# Patient Record
Sex: FEMALE | Race: WHITE | URBAN - METROPOLITAN AREA
[De-identification: names, ages, dates, MRNs, and addresses within clinical notes are randomized per-mention and may not be internally consistent; named-entity substitution may affect disease eponyms.]

---

## 2018-07-16 ENCOUNTER — TRANSFERRED RECORDS (OUTPATIENT)
Dept: HEALTH INFORMATION MANAGEMENT | Facility: CLINIC | Age: 68
End: 2018-07-16

## 2018-07-16 LAB — CREAT SERPL-MCNC: 0.73 MG/DL (ref 0.6–1)

## 2018-08-09 ENCOUNTER — TRANSFERRED RECORDS (OUTPATIENT)
Dept: HEALTH INFORMATION MANAGEMENT | Facility: CLINIC | Age: 68
End: 2018-08-09

## 2018-08-23 ENCOUNTER — ONCOLOGY VISIT (OUTPATIENT)
Dept: ONCOLOGY | Facility: CLINIC | Age: 68
End: 2018-08-23
Attending: INTERNAL MEDICINE
Payer: COMMERCIAL

## 2018-08-23 VITALS
SYSTOLIC BLOOD PRESSURE: 159 MMHG | HEIGHT: 67 IN | TEMPERATURE: 98.1 F | RESPIRATION RATE: 18 BRPM | WEIGHT: 170.7 LBS | HEART RATE: 64 BPM | BODY MASS INDEX: 26.79 KG/M2 | OXYGEN SATURATION: 99 % | DIASTOLIC BLOOD PRESSURE: 65 MMHG

## 2018-08-23 DIAGNOSIS — C21.1 MALIGNANT NEOPLASM OF ANAL CANAL (H): ICD-10-CM

## 2018-08-23 DIAGNOSIS — I48.20 CHRONIC ATRIAL FIBRILLATION (H): ICD-10-CM

## 2018-08-23 PROBLEM — Z72.0 NICOTINE ABUSE: Status: ACTIVE | Noted: 2018-08-23

## 2018-08-23 PROCEDURE — 99205 OFFICE O/P NEW HI 60 MIN: CPT | Performed by: INTERNAL MEDICINE

## 2018-08-23 PROCEDURE — G0463 HOSPITAL OUTPT CLINIC VISIT: HCPCS

## 2018-08-23 RX ORDER — CLONIDINE HYDROCHLORIDE 0.1 MG/1
0.1 TABLET ORAL
COMMUNITY
End: 2018-08-30

## 2018-08-23 ASSESSMENT — PAIN SCALES - GENERAL: PAINLEVEL: MILD PAIN (2)

## 2018-08-23 NOTE — LETTER
8/23/2018         RE: Jenise Calix  [address]  [place] [address]        Dear Colleague,    Thank you for referring your patient, Jenise Calix, to the [facility]. Please see a copy of my visit note below.    DATE OF VISIT: Aug 23, 2018    REASON FOR REFERRAL: Management of anal cancer.    CHIEF COMPLAINT:   Chief Complaint   Patient presents with     Oncology Clinic Visit     New Patient - Anal CA.        HISTORY OF PRESENT ILLNESS:   This is a 68-year-old female patient has been difficulty with hemorrhoids for several years.  She saw  last year in September 2017 because of large hemorrhoids and it was recommended to proceed with surgery.  Patient elected to postpone the surgery and go to [place] to visit her children.  She continued to have difficulty with defecation.  She has been having also pain with bowel movement as well.  She has been taking stool softener.  She has been also having occasional rectal bleeding.  She recently saw Dr. Becerra on July 16, 2018 and had a flexible sigmoidoscopy which showed severely ulcerated lesion at 12 o'clock position was definitive irregularity and fullness at 6 o'clock position.  Biopsies were obtained.  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  Patient had a CT scan of the chest abdomen and pelvis on July 16, 2018 which revealed 0.9 cm nodule he will aspect of the right breast felt to be sebaceous cyst.  There was a mass involving the anus and the rectum particularly on the right measuring about 7 cm x 4 cm and extending anterior to posterior 5 cm posterior and 10 cm in longest axis.  This is inseparable from the floor of urinary bladder, extending to the skin service and engulfing the vagina and urethra.  The perirectal and anal lymph nodes were involved.  There is also a 3 cm mass in the left  "inguinal nodes.  The patient is here today to discuss further plan.    REVIEW OF SYSTEMS:   Constitutional: Negative for fever, chills, and night sweats.  Skin: negative.  Eyes: negative.  Ears/Nose/Throat: negative.  Respiratory: No shortness of breath, dyspnea on exertion, cough, or hemoptysis.  Cardiovascular: negative.  Gastrointestinal: Constipation, discomfort during defecation and occasional blood per rectum  Genitourinary: negative.  Musculoskeletal: negative.  Neurologic: negative.  Psychiatric: negative.  Hematologic/Lymphatic/Immunologic: negative.  Endocrine: negative.    PAST MEDICAL HISTORY:   No past medical history on file.    PAST SURGICAL HISTORY:   No past surgical history on file.    ALLERGIES:   Allergies as of 08/23/2018 - Chau as Reviewed 08/23/2018   Allergen Reaction Noted     Metoprolol  07/26/2018       MEDICATIONS:   Current Outpatient Prescriptions   Medication Sig Dispense Refill     aspirin 81 MG tablet Take 81 mg by mouth       cloNIDine (CATAPRES) 0.1 MG tablet Take 0.1 mg by mouth          FAMILY HISTORY:   She denies any family history of malignancy.    SOCIAL HISTORY:   Social History     Social History     Marital status:      Spouse name: N/A     Number of children: N/A     Years of education: N/A     Social History Main Topics     Smoking status: Current Every Day Smoker     Packs/day: 0.75     Years: 35.00     Types: Cigarettes     Smokeless tobacco: Never Used     Alcohol use No     Drug use: No     Sexual activity: Not Asked     Other Topics Concern     None     Social History Narrative     None       PHYSICAL EXAMINATION:   /65 (BP Location: Right arm, Patient Position: Sitting, Cuff Size: Adult Regular)  Pulse 64  Temp 98.1  F (36.7  C) (Tympanic)  Resp 18  Ht 1.702 m (5' 7\")  Wt 77.4 kg (170 lb 11.2 oz)  SpO2 99%  Breastfeeding? No  BMI 26.74 kg/m2  Wt Readings from Last 10 Encounters:   08/23/18 77.4 kg (170 lb 11.2 oz)      ECOG performance status: " 1  GENERAL APPEARANCE: Healthy, alert and in no acute distress.  HEENT: Sclerae anicteric. PERRLA. Oropharynx without ulcers, lesions, or thrush.  NECK: Supple. No asymmetry or masses.  LYMPHATICS: No palpable cervical, supraclavicular, axillary, or inguinal lymphadenopathy.  RESP: Lungs clear to auscultation bilaterally without rales, rhonchi or wheezes.  CARDIOVASCULAR: Regular rate and rhythm. Normal S1, S2; no S3 or S4. No murmur, gallop, or rub.  ABDOMEN: Soft, nontender. Bowel sounds normal. No palpable organomegaly or masses.  MUSCULOSKELETAL: Extremities without gross deformities noted. No edema of bilateral lower extremities.  SKIN: No suspicious lesions or rashes.  NEURO: Alert and oriented x 3. Cranial nerves II-XII grossly intact.  PSYCHIATRIC: Mentation and affect appear normal.    LABORATORY RESULTS:  Office Visit on 11/28/2012   Component Date Value Ref Range Status     Specimen Description 11/28/2012 Toe   Final     Culture Micro 11/28/2012    Final                    Value:Moderate growth Enterobacter cloacae complex                          Moderate growth Coagulase negative Staphylococcus                          Plus normal skin vernell.     Micro Report Status 11/28/2012 FINAL 12/02/2012   Final       IMAGING RESULTS:  Outside medical records were reviewed today.    ASSESSMENT AND PLAN:    (C21.1) Malignant neoplasm of anal canal (H)  This is a 68-year-old female patient with squamous cell carcinoma of the anal canal at least stage III.  I reviewed with the patient today the natural history of squamous cell carcinoma of the anal canal.  We talked about staging, biology, management, follow-up and prognosis.  First is stable we will arrange for a PET scan to assess accurately the state of her disease.  We talked about different modalities and treatment of cell carcinoma of the anal canal including chemoradiation and surgery.  I gave the patient overview about chemoradiation.  We talked about  logistics, potential side effects of chemotherapy in details.  I will discuss this with the patient again once the PET scan is available we will refer the patient to see radiation oncology as well.  I will see the patient again next week to discuss further management.    (I48.2) Chronic atrial fibrillation (H)  She has paroxysmal atrial fibrillation.  She is currently on aspirin.    The patient is ready to learn, no apparent learning barriers were identified, Diagnosis and treatment plans were explained to the patient. The patient expressed understanding of the content. The patient questions were answered to her satisfaction.    Ced Gunn MD    I spent 60 minutes more than 50% of the time in counseling and coordination of care including discussion of natural history of renal cell carcinoma, reviewing of imaging studies, management, follow-up and prognosis    Chart documentation with Dragon Voice recognition Software. Although reviewed after completion, some words and grammatical errors may remain.    Again, thank you for allowing me to participate in the care of your patient.        Sincerely,        Ced Gunn MD

## 2018-08-23 NOTE — PROGRESS NOTES
DATE OF VISIT: Aug 23, 2018    REASON FOR REFERRAL: Management of anal cancer.    CHIEF COMPLAINT:   Chief Complaint   Patient presents with     Oncology Clinic Visit     New Patient - Anal CA.        HISTORY OF PRESENT ILLNESS:   This is a 68-year-old female patient has been difficulty with hemorrhoids for several years.  She saw  last year in September 2017 because of large hemorrhoids and it was recommended to proceed with surgery.  Patient elected to postpone the surgery and go to [place] to visit her children.  She continued to have difficulty with defecation.  She has been having also pain with bowel movement as well.  She has been taking stool softener.  She has been also having occasional rectal bleeding.  She recently saw Dr. Becerra on July 16, 2018 and had a flexible sigmoidoscopy which showed severely ulcerated lesion at 12 o'clock position was definitive irregularity and fullness at 6 o'clock position.  Biopsies were obtained.  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  Patient had a CT scan of the chest abdomen and pelvis on July 16, 2018 which revealed 0.9 cm nodule he will aspect of the right breast felt to be sebaceous cyst.  There was a mass involving the anus and the rectum particularly on the right measuring about 7 cm x 4 cm and extending anterior to posterior 5 cm posterior and 10 cm in longest axis.  This is inseparable from the floor of urinary bladder, extending to the skin service and engulfing the vagina and urethra.  The perirectal and anal lymph nodes were involved.  There is also a 3 cm mass in the left inguinal nodes.  The patient is here today to discuss further plan.    REVIEW OF SYSTEMS:   Constitutional: Negative for fever, chills, and night sweats.  Skin: negative.  Eyes: negative.  Ears/Nose/Throat: negative.  Respiratory: No shortness  "of breath, dyspnea on exertion, cough, or hemoptysis.  Cardiovascular: negative.  Gastrointestinal: Constipation, discomfort during defecation and occasional blood per rectum  Genitourinary: negative.  Musculoskeletal: negative.  Neurologic: negative.  Psychiatric: negative.  Hematologic/Lymphatic/Immunologic: negative.  Endocrine: negative.    PAST MEDICAL HISTORY:   No past medical history on file.    PAST SURGICAL HISTORY:   No past surgical history on file.    ALLERGIES:   Allergies as of 08/23/2018 - Chau as Reviewed 08/23/2018   Allergen Reaction Noted     Metoprolol  07/26/2018       MEDICATIONS:   Current Outpatient Prescriptions   Medication Sig Dispense Refill     aspirin 81 MG tablet Take 81 mg by mouth       cloNIDine (CATAPRES) 0.1 MG tablet Take 0.1 mg by mouth          FAMILY HISTORY:   She denies any family history of malignancy.    SOCIAL HISTORY:   Social History     Social History     Marital status:      Spouse name: N/A     Number of children: N/A     Years of education: N/A     Social History Main Topics     Smoking status: Current Every Day Smoker     Packs/day: 0.75     Years: 35.00     Types: Cigarettes     Smokeless tobacco: Never Used     Alcohol use No     Drug use: No     Sexual activity: Not Asked     Other Topics Concern     None     Social History Narrative     None       PHYSICAL EXAMINATION:   /65 (BP Location: Right arm, Patient Position: Sitting, Cuff Size: Adult Regular)  Pulse 64  Temp 98.1  F (36.7  C) (Tympanic)  Resp 18  Ht 1.702 m (5' 7\")  Wt 77.4 kg (170 lb 11.2 oz)  SpO2 99%  Breastfeeding? No  BMI 26.74 kg/m2  Wt Readings from Last 10 Encounters:   08/23/18 77.4 kg (170 lb 11.2 oz)      ECOG performance status: 1  GENERAL APPEARANCE: Healthy, alert and in no acute distress.  HEENT: Sclerae anicteric. PERRLA. Oropharynx without ulcers, lesions, or thrush.  NECK: Supple. No asymmetry or masses.  LYMPHATICS: No palpable cervical, supraclavicular, " axillary, or inguinal lymphadenopathy.  RESP: Lungs clear to auscultation bilaterally without rales, rhonchi or wheezes.  CARDIOVASCULAR: Regular rate and rhythm. Normal S1, S2; no S3 or S4. No murmur, gallop, or rub.  ABDOMEN: Soft, nontender. Bowel sounds normal. No palpable organomegaly or masses.  MUSCULOSKELETAL: Extremities without gross deformities noted. No edema of bilateral lower extremities.  SKIN: No suspicious lesions or rashes.  NEURO: Alert and oriented x 3. Cranial nerves II-XII grossly intact.  PSYCHIATRIC: Mentation and affect appear normal.    LABORATORY RESULTS:  Office Visit on 11/28/2012   Component Date Value Ref Range Status     Specimen Description 11/28/2012 Toe   Final     Culture Micro 11/28/2012    Final                    Value:Moderate growth Enterobacter cloacae complex                          Moderate growth Coagulase negative Staphylococcus                          Plus normal skin vernell.     Micro Report Status 11/28/2012 FINAL 12/02/2012   Final       IMAGING RESULTS:  Outside medical records were reviewed today.    ASSESSMENT AND PLAN:    (C21.1) Malignant neoplasm of anal canal (H)  This is a 68-year-old female patient with squamous cell carcinoma of the anal canal at least stage III.  I reviewed with the patient today the natural history of squamous cell carcinoma of the anal canal.  We talked about staging, biology, management, follow-up and prognosis.  First is stable we will arrange for a PET scan to assess accurately the state of her disease.  We talked about different modalities and treatment of cell carcinoma of the anal canal including chemoradiation and surgery.  I gave the patient overview about chemoradiation.  We talked about logistics, potential side effects of chemotherapy in details.  I will discuss this with the patient again once the PET scan is available we will refer the patient to see radiation oncology as well.  I will see the patient again next week to  discuss further management.    (I48.2) Chronic atrial fibrillation (H)  She has paroxysmal atrial fibrillation.  She is currently on aspirin.    The patient is ready to learn, no apparent learning barriers were identified, Diagnosis and treatment plans were explained to the patient. The patient expressed understanding of the content. The patient questions were answered to her satisfaction.    Ced Gunn MD    I spent 60 minutes more than 50% of the time in counseling and coordination of care including discussion of natural history of renal cell carcinoma, reviewing of imaging studies, management, follow-up and prognosis    Chart documentation with Dragon Voice recognition Software. Although reviewed after completion, some words and grammatical errors may remain.

## 2018-08-23 NOTE — NURSING NOTE
"Oncology Rooming Note    August 23, 2018 9:43 AM   Jenise Calix is a 68 year old female who presents for:    Chief Complaint   Patient presents with     Oncology Clinic Visit     New Patient -      Initial Vitals: /65 (BP Location: Right arm, Patient Position: Sitting, Cuff Size: Adult Regular)  Pulse 64  Temp 98.1  F (36.7  C) (Tympanic)  Resp 18  Ht 1.702 m (5' 7\")  Wt 77.4 kg (170 lb 11.2 oz)  SpO2 99%  Breastfeeding? No  BMI 26.74 kg/m2 Estimated body mass index is 26.74 kg/(m^2) as calculated from the following:    Height as of this encounter: 1.702 m (5' 7\").    Weight as of this encounter: 77.4 kg (170 lb 11.2 oz). Body surface area is 1.91 meters squared.  Mild Pain (2) Comment: anal discomfort.    No LMP recorded. Patient is postmenopausal.  Allergies reviewed: Yes  Medications reviewed: Yes    Medications: Medication refills not needed today.  Pharmacy name entered into EPIC: Data Unavailable    Clinical concerns: NEW,. Anal CA. C/o 2/10 anal discomfort. First discovered late last fall. Currently taking laxatives and stool softeners.     8 minutes for nursing intake (face to face time)     Faby Neff [facility]            "

## 2018-08-23 NOTE — PATIENT INSTRUCTIONS
We would like you to have a PET scan in the morning on 8/29/18. We would like you consult with the radiation doctor. Dr. Gunn would like to see you for a follow up appointment the same day with the results. You were given information on the medications 5FU and Mitomycin. We will do a formal teach same day as your appointment.    Copy of appointments, and after visit summary (AVS) given to patient.      If you have any questions during business hours (M-F 8 AM- 4PM), please call Rupa Plascencia RN Oncology Hematology  at [facility] [phone].       For questions after business hours, or on holidays/weekends, please call our after hours Nurse Triage line [phone]. Thank you.

## 2018-08-23 NOTE — MR AVS SNAPSHOT
After Visit Summary   8/23/2018    Jenise Calix    MRN: [id number]           Patient Information     Date Of Birth          1950        Visit Information        Provider Department      8/23/2018 10:00 AM Ced Gunn MD [facility] ONCOLOGY      Today's Diagnoses     Malignant neoplasm of anal canal (H)        Chronic atrial fibrillation (H)          Care Instructions    We would like you to have a PET scan in the morning on 8/29/18. We would like you consult with the radiation doctor. Dr. Gunn would like to see you for a follow up appointment the same day with the results. You were given information on the medications 5FU and Mitomycin. We will do a formal teach same day as your appointment.    Copy of appointments, and after visit summary (AVS) given to patient.      If you have any questions during business hours (M-F 8 AM- 4PM), please call Rupa Plascencia RN Oncology Hematology  at [facility] [phone].       For questions after business hours, or on holidays/weekends, please call our after hours Nurse Triage line [phone]. Thank you.            Follow-ups after your visit        Additional Services     RAD ONCOLOGY REFERRAL       Your provider has referred you to: Radiation therapy    Please be aware that coverage of these services is subject to the terms and limitations of your health insurance plan.  Call member services at your health plan with any benefit or coverage questions.      Please bring the following with you to your appointment:    (1) Any X-Rays, CTs or MRIs which have been performed.  Contact the facility where they were done to arrange for  prior to your scheduled appointment.    (2) List of current medications   (3) This referral request   (4) Any documents/labs given to you for this referral                  Follow-up notes from your care team     Return in about 6 days (around 8/29/2018) for  Imaging ordered today.      Your next 10 appointments already scheduled     Aug 29, 2018  9:15 AM CDT   (Arrive by 8:45 AM)   PE NPET ONCOLOGY (EYES TO THIGHS) with [id number]   [facility] Pet CT ([facility])    [address]  [facility] [address] [phone]           Tell your doctor:   If there is any chance you may be pregnant or if you are breastfeeding.   If you have problems lying in small spaces (claustrophobia). If you do, your doctor may give you medicine to help you relax. If you have diabetes:   Have your exam early in the morning. Your blood glucose will go up as the day goes by.   Your glucose level must be 180 or less at the start of the exam. Please take any oral diabetic medication you need to ensure this blood glucose level is below 180, but no insulin 4 hours prior to the exam.   If you are taking insulin in the morning take with breakfast by 6 am and schedule procedure between 12 and 2:15 pm.    If you are taking insulin at night take nightly dose, fast overnight, schedule procedure before 10 am.    If you take insulin both morning and night take morning dose by 6am and schedule procedure between 12 and 2:15 pm.  24 hours before your scan: Don t do any heavy exercise. (No jogging, aerobics or other workouts.) Exercise will make your pictures less accurate. 6 hours before your scan:   Stop all food and liquids (except water).   Do not chew gum or suck on mints.   If you need to take medicine with food, you may take it with a few crackers.  Please call your Imaging Department at your exam site with any questions.            Aug 29, 2018 10:45 AM CDT   Return Visit with Ced Gunn MD   [facility] ([facility])    [facility]  [address]  [facility] [address]   [phone]              Future tests that were ordered for you today     Open Future Orders        Priority Expected Expires Ordered    PET  "Oncology (Eyes to Thighs) Routine  2019            Who to contact     If you have questions or need follow up information about today's clinic visit or your schedule please contact [facility] directly at [phone].  Normal or non-critical lab and imaging results will be communicated to you by MyChart, letter or phone within 4 business days after the clinic has received the results. If you do not hear from us within 7 days, please contact the clinic through [employer] or phone. If you have a critical or abnormal lab result, we will notify you by phone as soon as possible.  Submit refill requests through [employer] or call your pharmacy and they will forward the refill request to us. Please allow 3 business days for your refill to be completed.          Additional Information About Your Visit        [employer] Information     [employer] lets you send messages to your doctor, view your test results, renew your prescriptions, schedule appointments and more. To sign up, go to [email or web address] . Click on \"Log in\" on the left side of the screen, which will take you to the Welcome page. Then click on \"Sign up Now\" on the right side of the page.     You will be asked to enter the access code listed below, as well as some personal information. Please follow the directions to create your username and password.     Your access code is: [email or web address]  Expires: 2018 11:02 AM     Your access code will  in 90 days. If you need help or a new code, please call your [place] clinic or [phone].        Care EveryWhere ID     This is your Care EveryWhere ID. This could be used by other organizations to access your [place] medical records  [id number]        Your Vitals Were     Pulse Temperature Respirations Height Pulse Oximetry Breastfeeding?    64 98.1  F (36.7  C) (Tympanic) 18 1.702 m (5' 7\") 99% No    BMI (Body Mass Index)                   26.74 kg/m2            Blood Pressure from Last 3 " Encounters:   08/23/18 159/65    Weight from Last 3 Encounters:   08/23/18 77.4 kg (170 lb 11.2 oz)              We Performed the Following     RAD ONCOLOGY REFERRAL        Primary Care Provider Fax #    Physician No Ref-Primary [phone]       No address on file        Equal Access to Services     AGUSTIN HAMILTON : Hadii basilio mcqueen enedeliao Soomaali, waaxda luqadaha, qaybta kaalmada adeegyada, katina howard racieldonny hernandezrobynbrianne whitaker . So [facility] [phone].    ATENCIÓN: Si habla español, tiene a sanchez disposición servicios gratuitos de asistencia lingüística. Llame al [phone].    We comply with applicable federal civil rights laws and [place] laws. We do not discriminate on the basis of race, color, national origin, age, disability, sex, sexual orientation, or gender identity.            Thank you!     Thank you for choosing [facility]  for your care. Our goal is always to provide you with excellent care. Hearing back from our patients is one way we can continue to improve our services. Please take a few minutes to complete the written survey that you may receive in the mail after your visit with us. Thank you!             Your Updated Medication List - Protect others around you: Learn how to safely use, store and throw away your medicines at www.disposemymeds.org.          This list is accurate as of 8/23/18 11:02 AM.  Always use your most recent med list.                   Brand Name Dispense Instructions for use Diagnosis    aspirin 81 MG tablet      Take 81 mg by mouth        cloNIDine 0.1 MG tablet    CATAPRES     Take 0.1 mg by mouth

## 2018-08-29 ENCOUNTER — HOSPITAL ENCOUNTER (OUTPATIENT)
Dept: PET IMAGING | Facility: CLINIC | Age: 68
Discharge: HOME | End: 2018-08-29
Attending: INTERNAL MEDICINE | Admitting: INTERNAL MEDICINE
Payer: MEDICARE

## 2018-08-29 ENCOUNTER — ONCOLOGY VISIT (OUTPATIENT)
Dept: ONCOLOGY | Facility: CLINIC | Age: 68
End: 2018-08-29
Attending: INTERNAL MEDICINE
Payer: COMMERCIAL

## 2018-08-29 VITALS
BODY MASS INDEX: 26.66 KG/M2 | OXYGEN SATURATION: 98 % | HEART RATE: 93 BPM | TEMPERATURE: 98 F | RESPIRATION RATE: 20 BRPM | DIASTOLIC BLOOD PRESSURE: 79 MMHG | WEIGHT: 170.2 LBS | SYSTOLIC BLOOD PRESSURE: 158 MMHG

## 2018-08-29 DIAGNOSIS — I48.20 CHRONIC ATRIAL FIBRILLATION (H): ICD-10-CM

## 2018-08-29 DIAGNOSIS — C21.1 MALIGNANT NEOPLASM OF ANAL CANAL (H): ICD-10-CM

## 2018-08-29 DIAGNOSIS — C21.1 MALIGNANT NEOPLASM OF ANAL CANAL (H): Primary | ICD-10-CM

## 2018-08-29 PROCEDURE — 34300033 ZZH RX 343: Performed by: INTERNAL MEDICINE

## 2018-08-29 PROCEDURE — 78815 PET IMAGE W/CT SKULL-THIGH: CPT | Mod: PI

## 2018-08-29 PROCEDURE — 99214 OFFICE O/P EST MOD 30 MIN: CPT | Performed by: INTERNAL MEDICINE

## 2018-08-29 PROCEDURE — 40000269 ZZH STATISTIC NO CHARGE FACILITY FEE

## 2018-08-29 PROCEDURE — A9552 F18 FDG: HCPCS | Performed by: INTERNAL MEDICINE

## 2018-08-29 RX ADMIN — FLUDEOXYGLUCOSE F-18 15.2 MCI.: 500 INJECTION, SOLUTION INTRAVENOUS at 09:39

## 2018-08-29 ASSESSMENT — PAIN SCALES - GENERAL: PAINLEVEL: NO PAIN (0)

## 2018-08-29 NOTE — LETTER
8/29/2018         RE: Jenise Calix  [address] [place] [address]        Dear Colleague,    Thank you for referring your patient, Jenise Calix, to the [facility]. Please see a copy of my visit note below.    DATE OF VISIT: Aug 29, 2018    Jenise Calix is a 68 year old female is seen today for   Chief Complaint   Patient presents with     Oncology Clinic Visit     Follow up after PET scan, Malignant neoplasm of anal canal   .       (C21.1) Malignant neoplasm of anal canal (H)  (primary encounter diagnosis)    I reviewed with the patient today the PET scan in details.  There is intensely hypermetabolic mass in the anal canal consistent with primary malignancy.  There is hypermetabolic adenopathy in the left inguinal location worrisome for malignant adenopathy.. There is a focal hypermetabolic area in the left thyroid indeterminant.. No suggestion of metastatic disease.  Patient will be seeing by radiation oncology tomorrow.  Recommend to proceed with radiation therapy concurrent with infusional 5-FU and mitomycin.  I gave the patient overview about chemotherapy in details.  I will see the patient again over the first day of radiation therapy to discuss further and will answer her questions.    The patient is ready to learn, no apparent learning barriers were identified.  Diagnosis and treatment plans were explained to the patient. The patient expressed understanding of the content. The patient asked appropriate questions. The patient questions were answered to her satisfaction.  Time spent 25 minutes more than 50% of the time in counseling and coordination of care including discussion of management of anal cancer, staging, management, follow-up and prognosis  Chart documentation with Dragon Voice recognition Software. Although reviewed after completion, some words and grammatical errors may remain.    Again, thank you for allowing me to participate in the care of your patient.         Sincerely,        Ced Gunn MD

## 2018-08-29 NOTE — MR AVS SNAPSHOT
After Visit Summary   8/29/2018    Jenise Calix    MRN: [id number]           Patient Information     Date Of Birth          1950        Visit Information        Provider Department      8/29/2018 10:45 AM Ced Gunn MD [facility] [place]        Today's Diagnoses     Malignant neoplasm of anal canal (H)    -  1    Chronic atrial fibrillation (H)          Care Instructions    You will need to have a port a cath placed for your chemotherapy treatment. We will try to schedule you for your consult and placement for the same day. We would like you to come to the clinic at 1 PM tomorrow 8/30/18 for a formal teach of your chemotherapy. Dr. Gunn would like to see you back for a follow up appointment the same day of your radiation treatment therapy.      Copy of appointments, and after visit summary (AVS) given to patient.      If you have any questions during business hours (M-F 8 AM- 4PM), please call Rupa Plascencia RN Oncology Hematology  at [facility] [place] [phone].       For questions after business hours, or on holidays/weekends, please call our after hours Nurse Triage line [phone]. Thank you.            Follow-ups after your visit        Additional Services     GENERAL SURG ADULT REFERRAL       Your provider has referred you to: [facility]: [facility] [phone]   [email or web address]    Port a cath placement for chemotherapy  Please be aware that coverage of these services is subject to the terms and limitations of your health insurance plan.  Call member services at your health plan with any benefit or coverage questions.      Please bring the following with you to your appointment:    (1) Any X-Rays, CTs or MRIs which have been performed.  Contact the facility where they were done to arrange for  prior to your scheduled appointment.   (2) List of current medications   (3) This  "referral request   (4) Any documents/labs given to you for this referral                  Your next 10 appointments already scheduled     Aug 30, 2018  1:30 PM CDT   CONSULT with Edmundo Iqbal MD   [facility] ([facility])    [address], [address]  [facility] [place] [place] [address]   [phone]            Sep 04, 2018  9:45 AM CDT   New Visit with Chau Ferro MD   [facility]    [address] [id number] [phone]              Future tests that were ordered for you today     Open Future Orders        Priority Expected Expires Ordered    GENERAL SURG ADULT REFERRAL Routine 8/30/2018 9/29/2018 8/29/2018            Who to contact     If you have questions or need follow up information about today's clinic visit or your schedule please contact [facility] directly at [phone].  Normal or non-critical lab and imaging results will be communicated to you by MyChart, letter or phone within 4 business days after the clinic has received the results. If you do not hear from us within 7 days, please contact the clinic through [employer] or phone. If you have a critical or abnormal lab result, we will notify you by phone as soon as possible.  Submit refill requests through [employer] or call your pharmacy and they will forward the refill request to us. Please allow 3 business days for your refill to be completed.          Additional Information About Your Visit        [employer] Information     [employer] lets you send messages to your doctor, view your test results, renew your prescriptions, schedule appointments and more. To sign up, go to [email or web address] . Click on \"Log in\" on the left side of the screen, which will take you to the Welcome page. Then click on \"Sign up Now\" on the right side of the page.     You will be asked to enter the access code listed below, as well as some personal information. Please follow " the directions to create your username and password.     Your access code is: [email or web address]  Expires: 2018 11:02 AM     Your access code will  in 90 days. If you need help or a new code, please call your [facility] clinic or [phone].        Care EveryWhere ID     This is your Care EveryWhere ID. This could be used by other organizations to access your [facility] medical records  [id number]        Your Vitals Were     Pulse Temperature Respirations Pulse Oximetry BMI (Body Mass Index)       93 98  F (36.7  C) (Axillary) 20 98% 26.66 kg/m2        Blood Pressure from Last 3 Encounters:   18 158/79   18 159/65    Weight from Last 3 Encounters:   18 77.2 kg (170 lb 3.2 oz)   18 77.4 kg (170 lb 11.2 oz)               Primary Care Provider Fax #    Physician No Ref-Primary [phone]       No address on file        Equal Access to Services     AGUSTIN HAMILTON : Hadii basilio ku hadasho Soomaali, waaxda luqadaha, qaybta kaalmada adeegyada, katina whitaker . So [facility] [phone].    ATENCIÓN: Si habla español, tiene a sanchez disposición servicios gratuitos de asistencia lingüística. Llame al [phone].    We comply with applicable federal civil rights laws and [place] laws. We do not discriminate on the basis of race, color, national origin, age, disability, sex, sexual orientation, or gender identity.            Thank you!     Thank you for choosing [facility]  for your care. Our goal is always to provide you with excellent care. Hearing back from our patients is one way we can continue to improve our services. Please take a few minutes to complete the written survey that you may receive in the mail after your visit with us. Thank you!             Your Updated Medication List - Protect others around you: Learn how to safely use, store and throw away your medicines at www.disposemymeds.org.          This list is accurate as of 18 11:27 AM.  Always use  your most recent med list.                   Brand Name Dispense Instructions for use Diagnosis    aspirin 81 MG tablet      Take 81 mg by mouth        cloNIDine 0.1 MG tablet    CATAPRES     Take 0.1 mg by mouth

## 2018-08-29 NOTE — NURSING NOTE
"Oncology Rooming Note    August 29, 2018 10:44 AM   Jenise Calix is a 68 year old female who presents for:    Chief Complaint   Patient presents with     Oncology Clinic Visit     Follow up after PET scan, Malignant neoplasm of anal canal     Initial Vitals: /79 (BP Location: Right arm, Patient Position: Sitting, Cuff Size: Adult Regular)  Pulse 93  Temp 98  F (36.7  C) (Axillary)  Resp 20  Wt 77.2 kg (170 lb 3.2 oz)  SpO2 98%  BMI 26.66 kg/m2 Estimated body mass index is 26.66 kg/(m^2) as calculated from the following:    Height as of 8/23/18: 1.702 m (5' 7\").    Weight as of this encounter: 77.2 kg (170 lb 3.2 oz). Body surface area is 1.91 meters squared.  No Pain (0) Comment: Data Unavailable   No LMP recorded. Patient is postmenopausal.  Allergies reviewed: Yes  Medications reviewed: Yes    Medications: Medication refills not needed today.  Pharmacy name entered into EPIC: Data Unavailable    Clinical concerns:   Follow up after PET scan, Malignant neoplasm of anal canal    7 minutes for nursing intake (face to face time)     Nasrin Gardner LPN              "

## 2018-08-29 NOTE — PATIENT INSTRUCTIONS
You will need to have a port a cath placed for your chemotherapy treatment. We will try to schedule you for your consult and placement for the same day. We would like you to come to the clinic at 1 PM tomorrow 8/30/18 for a formal teach of your chemotherapy. Dr. Gunn would like to see you back for a follow up appointment the same day of your radiation treatment therapy.      Copy of appointments, and after visit summary (AVS) given to patient.      If you have any questions during business hours (M-F 8 AM- 4PM), please call Rupa Plascencia RN Oncology Hematology  at [facility] [phone].       For questions after business hours, or on holidays/weekends, please call our after hours Nurse Triage line [phone]. Thank you.

## 2018-08-30 ENCOUNTER — DOCUMENTATION ONLY (OUTPATIENT)
Dept: ONCOLOGY | Facility: CLINIC | Age: 68
End: 2018-08-30

## 2018-08-30 ENCOUNTER — OFFICE VISIT (OUTPATIENT)
Dept: RADIATION THERAPY | Facility: OUTPATIENT CENTER | Age: 68
End: 2018-08-30
Payer: COMMERCIAL

## 2018-08-30 VITALS
BODY MASS INDEX: 26.56 KG/M2 | WEIGHT: 169.6 LBS | SYSTOLIC BLOOD PRESSURE: 151 MMHG | DIASTOLIC BLOOD PRESSURE: 85 MMHG | HEART RATE: 57 BPM | RESPIRATION RATE: 16 BRPM | OXYGEN SATURATION: 98 %

## 2018-08-30 DIAGNOSIS — I48.91 ATRIAL FIBRILLATION (H): ICD-10-CM

## 2018-08-30 DIAGNOSIS — C21.1 MALIGNANT NEOPLASM OF ANAL CANAL (H): Primary | ICD-10-CM

## 2018-08-30 ASSESSMENT — PAIN SCALES - GENERAL: PAINLEVEL: NO PAIN (0)

## 2018-08-30 NOTE — PROGRESS NOTES
"Chemotherapy Education  Patient is a 68 year old female here today for chemotherapy education, accompanied by Chery, her sister in law.  Pt has a cancer diagnosis of Anal Cancer and their main concern is treatment plan.  Their Oncologist is Dr. Gunn.  .  Reviewed the following with the patient and their support person:  Treatment Goal/Regimen/Duration; Mitomycin and 5FU and rationale for strict adherence, specific medication names including pre-treatment medications and at home scheduled or as needed medications, delivery methods, and side effects and management; including skin changes/hand-foot syndrome, anemia, neutropenia, thrombocytopenia, diarrhea/constipation, hair loss syndrome, memory changes/ \"chemobrain\", mouth sores, taste changes, neuropathy, fatigue, infertility, myelosuppression, and risk of extravasation or infiltration.  Infection prevention, and monitoring of lab values, what lab tests and what changes of these values meant, along with the possibility of hydration or blood product transfusion, or the need to defer or hold treatment.    General Chemotherapy Information, including ways it is excreted from the body and cleaning and containment of vomitus or other bodily fluid, use of the bathroom, sexual health and intimacy, what to do if needing to miss a treatment, when to call a provider and the need for staff to wear protective equipment.  Importance of Central line care (port) or IV site care.  Proper use of the take home infusion pump, troubleshooting, and who to call if there is a malfunction.  Written Information: written information including the \"My Cancer Guidebook\" including \"Getting Ready for Chemotherapy: What to Expect, Before, During, and After your Treatment\" booklet, specific drug information guides printed from [employer], information on when to contact the provider, various programs offered at [facility], and our business card with contact information given; Oncology Clinic, " RN Case Manager, and the after hours Nurse Advise Line.  No barriers to learning identified. Patient and family verbalized understanding of all written and verbal information. All questions answered to patients satisfaction.   General Orientation to the Medical Oncology department, Infusion Services department, Huc/scheduling, bathrooms and usual flow of the treatment day provided as well as introduction to the Infusion nurses.    Other Concerns: none  Pt instructed to call with further questions or concerns.  Patient states understanding and is in agreement with this plan.  Copy of appointments, and after visit summary (AVS) given to patient. Patient discharged to home with family.    Face to Face Time with Patient: 30    Jessica Lopez RN, BSN, OCN  Oncology Hematology   [facility]  Phone [phone]

## 2018-08-30 NOTE — PROGRESS NOTES
Department of Radiation Oncology  [facility]  [address] [place] [address]  [phone]       Consultation Note    Name: Jenise Calix MRN: [id number]   : 1950   Date of Service: 2018  Referring: Dr. 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 Evaluate role of radiotherapy in definitive treatment strategy.    History of Present Illness   Ms. 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 She presents today to discuss the role of radiotherapy as a part of her  treatment strategy.     The patient has a long-standing history of hemorrhoids for several years.  She was initially seen by Dr. Becerra who recommended hemorrhoidectomy in 2017 due to large and symptomatic hemorrhoids.  The patient initially elected to postpone the surgery at the time.  She continued to have clinical symptoms of constipation, pain with bowel movement and, intermittent rectal bleeding. More recently on 2018 she underwent flexible sigmoidoscopy.  Findings demonstrated a severely ulcerated lesion at the 12 o'clock position with definite irregularity and fullness at the 6 o'clock position.  Biopsies of the lesion at the 12 o'clock position demonstrated moderately differentiated nonkeratinizing invasive squamous cell carcinoma.  Biopsy of the 6 o'clock lesion demonstrated at least squamous cell carcinoma in situ, cannot exclude invasion.  On 2018 the patient underwent a CT of the chest abdomen and pelvis.  CT scan demonstrated a mass involving the anus and rectum.  The mass was noted to be measuring 4 cm in diameter by 7 cm in the longest axis.  There was associated abnormal soft tissue extension anteriorly measuring 5 cm in the anterior to posterior direction, and 10 cm in the long axis.  This was noted to be inseparable from the floor the urinary bladder and  extending to the skin surface, and engulfing the vagina and urethra.  There is also noted a 3 cm x 1.3 cm left inguinal groin node as well as enlarged perirectal nodes.  On 8/23/2018 the patient was seen by Dr. Gunn who discussed the potential role of definitive chemoradiation therapy for treatment of locally advanced anal squamous cell carcinoma.  She recommended a PET scan for staging.  On 8/29/2014 PET scan was obtained.  Imaging demonstrated hypermetabolic activity in the anal canal region corresponding with the known malignancy.  Maximum SUV was 21.1.  The mass was noted to be approximately 3.3 cm in size.  There are also noted hypermetabolic activity in the left inguinal region, SUV of 15.2, corresponding to the left groin node measuring 3 cm x 1.8 cm.  The patient certainly presents today to discuss the potential role of radiation therapy as a part of treatment strategy.    Overall today the patient states that she is doing well.  She does have intermittent bouts of constipation for which she takes stool softeners.  He is currently having fairly normal bowel movements at this time.  She denies any hematochezia, hematuria, or vaginal bleeding.  Appetite is stable.  No weight loss.  No recent Pap smears.  No recent colonoscopy prior to recent evaluation.  The patient denies any rectal incontinence.  She feels she has control of her bowel movements.  No history of inflammatory bowel disease.  No prior radiation.  No history of immune deficiencies.    Past Medical History:   Past Medical History:   Diagnosis Date     Atrial fibrillation (H)      Cancer (H)        Past Surgical History:   Past Surgical History:   Procedure Laterality Date     BREAST SURGERY      lumpectomy benign lump unsure which side, 1990's       Chemotherapy History:  none    Radiation History:  none    Pregnant: No  Implanted Cardiac Devices: No    Medications:  Current Outpatient Prescriptions   Medication     aspirin 81 MG tablet     No  current facility-administered medications for this visit.          Allergies:     Allergies   Allergen Reactions     Metoprolol      And other beta blockers       Social History:  Tobacco: Current smoker, 0.75 ppd x 35 years, trying to quit.  Employment: Retired, previous .     Family History:  Family History   Problem Relation Age of Onset     Other Cancer No family hx of        Review of Systems   A 10-point review of systems was performed. Pertinent findings are noted in the HPI.    Physical Exam   ECOG Status: 1    Vitals:  /85 (Cuff Size: Adult Large)  Pulse 57  Resp 16  Wt 76.9 kg (169 lb 9.6 oz)  SpO2 98%  Breastfeeding? No  BMI 26.56 kg/m2  Gen: Alert, in NAD  Neck: Full ROM, supple, no palpable adenopathy  Pulm: No wheezing, stridor or respiratory distress  CV: Extremities are warm and well-perfused, no cyanosis, no pedal edema  Abdominal: Normal bowel sounds, soft, nontender, no masses  Gyn: Inspection and palpation did not reveal any definite vaginal or urethral extension. Posterior vaginal wall demonstrated firm induration suggestive of tumor pushing on posterior vaginal wall. Vaginal mucosa without any induration or irregularity appreciated. Normal cervical os.   Rectal exam: + Ulcerated anal mass with extension into the perianal skin. Located in 12 oclock and 6 o'clock position. Induration predominantly in the 12 o'clock extension with extension superiorly ~ 4 -5 cm. Intact rectal tone.  Lymph: + Palpated left groin node ~ 3cm. No skin ulceration.    Musculoskeletal: Normal bulk and tone  Skin: Normal color and turgor  Neuro: A/Ox3, CN II-XII intact, normal gait    Imaging/Path/Labs   Imaging:   PET   8/29/18  IMPRESSION:  1. Intensely hypermetabolic mass at the anal canal consistent with  primary malignancy.  2. Hypermetabolic adenopathy at the left inguinal location worrisome  for malignant adenopathy. This lymph node is slightly larger. There  are other bilateral inguinal and  pelvic lymph nodes that are minimally  prominent but do not show apparent hypermetabolism. Assessment is  limited by their small sizes.  3. Focal hypermetabolism at the left thyroid. This is indeterminate.  Focal thyroiditis versus thyroid neoplasm potentially could have this  appearance. Other thyroid nodules noted.  4. No other pathologic activity.  5. Suggestion of a sebaceous cyst at the medial right breast without  abnormal metabolism.    Path:  7/16/18        Assessment    Ms. 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 Imaging has demonstrated a large anal mass extending into the distal rectum with suspicious groin and perirectal and ?p elvic nodes. CT demonstrated concern for possible extension into the vagina, urethra, and bladder. PET did not reveal any evidence of distant metastatic spread. Clinically today on exam, no definitive extension into the vaginal canal or urethra was noted. She presents today to discuss the role of radiotherapy as a part of her  treatment strategy.    Plan   1. Today we discussed the natural history of squamous cell carcinoma.     2. Today we discussed the treatment treatment options with the patient including surgical resection with APR vs. Definitive chemo-radiation.  Definitive chemo-radiation is the standard of care per NCCN guidelines for anal carcinoma.  We are in agreement with pursuing concurrent chemoradiation to preserve sphincter function.     3. We discussed with the patient that treatment would consist of concurrent chemoradiation using 5-FU and mitomycin-C. Radiation treatment would target the primary tumor and draining lymphatic regions including the groin and pelvic lymph nodes. Radiation treatment would consist of 30-33 fractions to 54 Gy to 60 Gy.    4. We plan to treat according to RTOG 0529 which used IMRT concurrently with 5-FU and mitomycin-C where they found that IMRT significantly reduced  grade 2 hematologic and grade 3 dermatologic and GI toxicity.    5. We discussed that the risks from radiation therapy include, but are not limited to skin desquamation, fatigue, loose stools, abdominal cramping, painful urination, urinary frequency, decreased blood counts, bowel damage, bladder damage, rectal damage, rectal bleeding, bone necrosis, early menopause, vaginal stenosis, and secondary malignancies were explained to the patient. The patient has acknowledged.     6. To help with target delineation and better evaluation for local extent of disease into pelvic structure, we recommend obtaining an MRI pelvis for treatment planning. We will coordinate a CT simulation time the same day. Consent will be obtained at time of CT simulation.     Edmundo Iqbal MD  Department of Radiation Oncology  [facility]

## 2018-08-30 NOTE — LETTER
2018    RE: Jenise Calix  [address]  [place] [address]        Department of Radiation Oncology  [address] [address]  [phone]       Consultation Note    Name: Jenise Calix MRN: [id number]   : 1950   Date of Service: 2018  Referring: Dr. 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 Evaluate role of radiotherapy in definitive treatment strategy.    History of Present Illness   Ms. 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 She presents today to discuss the role of radiotherapy as a part of her  treatment strategy.     The patient has a long-standing history of hemorrhoids for several years.  She was initially seen by Dr. Becerra who recommended hemorrhoidectomy in 2017 due to large and symptomatic hemorrhoids.  The patient initially elected to postpone the surgery at the time.  She continued to have clinical symptoms of constipation, pain with bowel movement and, intermittent rectal bleeding. More recently on 2018 she underwent flexible sigmoidoscopy.  Findings demonstrated a severely ulcerated lesion at the 12 o'clock position with definite irregularity and fullness at the 6 o'clock position.  Biopsies of the lesion at the 12 o'clock position demonstrated moderately differentiated nonkeratinizing invasive squamous cell carcinoma.  Biopsy of the 6 o'clock lesion demonstrated at least squamous cell carcinoma in situ, cannot exclude invasion.  On 2018 the patient underwent a CT of the chest abdomen and pelvis.  CT scan demonstrated a mass involving the anus and rectum.  The mass was noted to be measuring 4 cm in diameter by 7 cm in the longest axis.  There was associated abnormal soft tissue extension anteriorly measuring 5 cm in the anterior to posterior direction, and 10 cm in the long axis.   This was noted to be inseparable from the floor the urinary bladder and extending to the skin surface, and engulfing the vagina and urethra.  There is also noted a 3 cm x 1.3 cm left inguinal groin node as well as enlarged perirectal nodes.  On 8/23/2018 the patient was seen by Dr. Gunn who discussed the potential role of definitive chemoradiation therapy for treatment of locally advanced anal squamous cell carcinoma.  She recommended a PET scan for staging.  On 8/29/2014 PET scan was obtained.  Imaging demonstrated hypermetabolic activity in the anal canal region corresponding with the known malignancy.  Maximum SUV was 21.1.  The mass was noted to be approximately 3.3 cm in size.  There are also noted hypermetabolic activity in the left inguinal region, SUV of 15.2, corresponding to the left groin node measuring 3 cm x 1.8 cm.  The patient certainly presents today to discuss the potential role of radiation therapy as a part of treatment strategy.    Overall today the patient states that she is doing well.  She does have intermittent bouts of constipation for which she takes stool softeners.  He is currently having fairly normal bowel movements at this time.  She denies any hematochezia, hematuria, or vaginal bleeding.  Appetite is stable.  No weight loss.  No recent Pap smears.  No recent colonoscopy prior to recent evaluation.  The patient denies any rectal incontinence.  She feels she has control of her bowel movements.  No history of inflammatory bowel disease.  No prior radiation.  No history of immune deficiencies.    Past Medical History:   Past Medical History:   Diagnosis Date     Atrial fibrillation (H)      Cancer (H)        Past Surgical History:   Past Surgical History:   Procedure Laterality Date     BREAST SURGERY      lumpectomy benign lump unsure which side, 1990's       Chemotherapy History:  none    Radiation History:  none    Pregnant: No  Implanted Cardiac Devices:  No    Medications:  Current Outpatient Prescriptions   Medication     aspirin 81 MG tablet     No current facility-administered medications for this visit.          Allergies:     Allergies   Allergen Reactions     Metoprolol      And other beta blockers       Social History:  Tobacco: Current smoker, 0.75 ppd x 35 years, trying to quit.  Employment: Retired, previous .     Family History:  Family History   Problem Relation Age of Onset     Other Cancer No family hx of        Review of Systems   A 10-point review of systems was performed. Pertinent findings are noted in the HPI.    Physical Exam   ECOG Status: 1    Vitals:  /85 (Cuff Size: Adult Large)  Pulse 57  Resp 16  Wt 76.9 kg (169 lb 9.6 oz)  SpO2 98%  Breastfeeding? No  BMI 26.56 kg/m2  Gen: Alert, in NAD  Neck: Full ROM, supple, no palpable adenopathy  Pulm: No wheezing, stridor or respiratory distress  CV: Extremities are warm and well-perfused, no cyanosis, no pedal edema  Abdominal: Normal bowel sounds, soft, nontender, no masses  Gyn: Inspection and palpation did not reveal any definite vaginal or urethral extension. Posterior vaginal wall demonstrated firm induration suggestive of tumor pushing on posterior vaginal wall. Vaginal mucosa without any induration or irregularity appreciated. Normal cervical os.   Rectal exam: + Ulcerated anal mass with extension into the perianal skin. Located in 12 oclock and 6 o'clock position. Induration predominantly in the 12 o'clock extension with extension superiorly ~ 4 -5 cm. Intact rectal tone.  Lymph: + Palpated left groin node ~ 3cm. No skin ulceration.    Musculoskeletal: Normal bulk and tone  Skin: Normal color and turgor  Neuro: A/Ox3, CN II-XII intact, normal gait    Imaging/Path/Labs   Imaging:   PET   8/29/18  IMPRESSION:  1. Intensely hypermetabolic mass at the anal canal consistent with  primary malignancy.  2. Hypermetabolic adenopathy at the left inguinal location worrisome  for  malignant adenopathy. This lymph node is slightly larger. There  are other bilateral inguinal and pelvic lymph nodes that are minimally  prominent but do not show apparent hypermetabolism. Assessment is  limited by their small sizes.  3. Focal hypermetabolism at the left thyroid. This is indeterminate.  Focal thyroiditis versus thyroid neoplasm potentially could have this  appearance. Other thyroid nodules noted.  4. No other pathologic activity.  5. Suggestion of a sebaceous cyst at the medial right breast without  abnormal metabolism.    Path:  7/16/18        Assessment    Ms. 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 Imaging has demonstrated a large anal mass extending into the distal rectum with suspicious groin and perirectal and ?p elvic nodes. CT demonstrated concern for possible extension into the vagina, urethra, and bladder. PET did not reveal any evidence of distant metastatic spread. Clinically today on exam, no definitive extension into the vaginal canal or urethra was noted. She presents today to discuss the role of radiotherapy as a part of her  treatment strategy.    Plan   1. Today we discussed the natural history of squamous cell carcinoma.     2. Today we discussed the treatment treatment options with the patient including surgical resection with APR vs. Definitive chemo-radiation.  Definitive chemo-radiation is the standard of care per NCCN guidelines for anal carcinoma.  We are in agreement with pursuing concurrent chemoradiation to preserve sphincter function.     3. We discussed with the patient that treatment would consist of concurrent chemoradiation using 5-FU and mitomycin-C. Radiation treatment would target the primary tumor and draining lymphatic regions including the groin and pelvic lymph nodes. Radiation treatment would consist of 30-33 fractions to 54 Gy to 60 Gy.    4. We plan to treat according to RTOG 0529 which  used IMRT concurrently with 5-FU and mitomycin-C where they found that IMRT significantly reduced grade 2 hematologic and grade 3 dermatologic and GI toxicity.    5. We discussed that the risks from radiation therapy include, but are not limited to skin desquamation, fatigue, loose stools, abdominal cramping, painful urination, urinary frequency, decreased blood counts, bowel damage, bladder damage, rectal damage, rectal bleeding, bone necrosis, early menopause, vaginal stenosis, and secondary malignancies were explained to the patient. The patient has acknowledged.     6. To help with target delineation and better evaluation for local extent of disease into pelvic structure, we recommend obtaining an MRI pelvis for treatment planning. We will coordinate a CT simulation time the same day. Consent will be obtained at time of CT simulation.     Edmundo Iqbal MD  Department of Radiation Oncology  [facility]

## 2018-08-30 NOTE — PROGRESS NOTES
DATE OF VISIT: Aug 29, 2018    Jenise Calix is a 68 year old female is seen today for   Chief Complaint   Patient presents with     Oncology Clinic Visit     Follow up after PET scan, Malignant neoplasm of anal canal   .       (C21.1) Malignant neoplasm of anal canal (H)  (primary encounter diagnosis)    I reviewed with the patient today the PET scan in details.  There is intensely hypermetabolic mass in the anal canal consistent with primary malignancy.  There is hypermetabolic adenopathy in the left inguinal location worrisome for malignant adenopathy.. There is a focal hypermetabolic area in the left thyroid indeterminant.. No suggestion of metastatic disease.  Patient will be seeing by radiation oncology tomorrow.  Recommend to proceed with radiation therapy concurrent with infusional 5-FU and mitomycin.  I gave the patient overview about chemotherapy in details.  I will see the patient again over the first day of radiation therapy to discuss further and will answer her questions.    The patient is ready to learn, no apparent learning barriers were identified.  Diagnosis and treatment plans were explained to the patient. The patient expressed understanding of the content. The patient asked appropriate questions. The patient questions were answered to her satisfaction.  Time spent 25 minutes more than 50% of the time in counseling and coordination of care including discussion of management of anal cancer, staging, management, follow-up and prognosis  Chart documentation with Dragon Voice recognition Software. Although reviewed after completion, some words and grammatical errors may remain.

## 2018-08-30 NOTE — NURSING NOTE
REASON FOR APPOINTMENT   Newly diagnosed anal cancer, s/p colonoscopy/biopsy. Seen by medical oncology.   Planning concurrent chemo/rt.  Port being placed.  Here to discuss RT.   See consult notes for dates/details    PERSONAL HISTORY OF CANCER   Previous Cancer ? no   Prior Radiation ? no   Prior Chemotherapy ? no   Prior Hormonal Therapy ? no     RECENT IMAGING STUDIES  See epic and care everywhere    REFERRALS NEEDED  Dietary     VITALS  /85 (Cuff Size: Adult Large)  Pulse 57  Resp 16  Wt 76.9 kg (169 lb 9.6 oz)  SpO2 98%  Breastfeeding? No  BMI 26.56 kg/m2    PACEMAKER/IMPLANTED CARDIAC DEVICE : No    PAIN  Denies    PSYCHOSOCIAL  Marital Status:   Patient lives alone in [place], [place]  Number of children: 3  Working status: retired  at "[employer],"   Do you feel safe in your home? Yes    REVIEW OF SYSTEMS  Skin: negative  Eyes: glasses  Ears/Nose/Throat: negative  Respiratory: No shortness of breath, dyspnea on exertion, cough, or hemoptysis  Cardiovascular: A-fib, not symptomatic  Gastrointestinal: chronic constipation, hemoroids. No bleeding or full feeling.   Genitourinary: negative  Musculoskeletal: negative  Neurologic: negative  Psychiatric: Very anxious, overwhelmed with recent dx  Hematologic/Lymphatic/Immunologic: menopausal night sweats  Endocrine: negative    WOMEN ONLY  Any chance you may be pregnant: No  Age at first period: 15  Date of last period: age 51  Number of pregnancies: 3  Birth Control pills: Yes    Radiation Oncology Patient Teaching    Person involved with teaching: Patient and her friend Chery  Patient asked Questions: Yes  Patient was cooperative: Yes  Patient was receptive (willing to accept information given): Yes    Education Assessment  Comprehension ability: High  Knowledge level: Medium  Factors affecting teaching: None    Education Materials Given  Radiation Therapy and You  Caring for Your Skin During Radiation ...  Disease Specific Booklet  Eating  Hints  Diet and Nutrition Information    Educational Topics Discussed  Disease process, Side effects, Pain management, Activity, Nutrition, Adjustment to illness and Community resources    Response To Teaching  Verbalizes understanding    GYN Only  Vaginal Dilator-given and educated: N/A    Do you have an advanced directive or living will? no  Are you DNR/DNI? no

## 2018-09-06 ENCOUNTER — APPOINTMENT (OUTPATIENT)
Dept: RADIATION THERAPY | Facility: OUTPATIENT CENTER | Age: 68
End: 2018-09-06
Payer: COMMERCIAL

## 2018-09-06 ENCOUNTER — HOSPITAL ENCOUNTER (OUTPATIENT)
Dept: MRI IMAGING | Facility: CLINIC | Age: 68
Discharge: HOME | End: 2018-09-06
Attending: RADIOLOGY | Admitting: RADIOLOGY
Payer: MEDICARE

## 2018-09-06 DIAGNOSIS — C21.1 MALIGNANT NEOPLASM OF ANAL CANAL (H): ICD-10-CM

## 2018-09-06 PROCEDURE — 72197 MRI PELVIS W/O & W/DYE: CPT

## 2018-09-06 PROCEDURE — A9585 GADOBUTROL INJECTION: HCPCS | Performed by: RADIOLOGY

## 2018-09-06 PROCEDURE — 25000128 H RX IP 250 OP 636: Performed by: RADIOLOGY

## 2018-09-06 RX ORDER — GADOBUTROL 604.72 MG/ML
7 INJECTION INTRAVENOUS ONCE
Status: COMPLETED | OUTPATIENT
Start: 2018-09-06 | End: 2018-09-06

## 2018-09-06 RX ADMIN — GADOBUTROL 7 ML: 604.72 INJECTION INTRAVENOUS at 12:31

## 2018-09-14 ENCOUNTER — OFFICE VISIT (OUTPATIENT)
Dept: SURGERY | Facility: CLINIC | Age: 68
End: 2018-09-14
Payer: COMMERCIAL

## 2018-09-14 VITALS
WEIGHT: 169 LBS | TEMPERATURE: 98.4 F | BODY MASS INDEX: 26.53 KG/M2 | HEART RATE: 70 BPM | HEIGHT: 67 IN | RESPIRATION RATE: 16 BRPM | SYSTOLIC BLOOD PRESSURE: 156 MMHG | DIASTOLIC BLOOD PRESSURE: 79 MMHG

## 2018-09-14 DIAGNOSIS — C21.1 MALIGNANT NEOPLASM OF ANAL CANAL (H): Primary | ICD-10-CM

## 2018-09-14 PROCEDURE — 99202 OFFICE O/P NEW SF 15 MIN: CPT | Performed by: SURGERY

## 2018-09-14 NOTE — LETTER
9/14/2018         RE: Jenise Calix  [address] [place] [address]        Dear Colleague,    Thank you for referring your patient, Jenise Calix, to the [facility]. Please see a copy of my visit note below.    68-year-old female with new diagnosis of anal cancer in need of Port-A-Cath for chemotherapy.  Patient has never had a central line.  Patient has atrial fibrillation but only takes an aspirin daily for this.    Patient Active Problem List   Diagnosis     Malignant neoplasm of anal canal (H)     Chronic atrial fibrillation (H)     Nicotine abuse     Atrial fibrillation (H)       Past Medical History:   Diagnosis Date     Atrial fibrillation (H)      Cancer (H)        Past Surgical History:   Procedure Laterality Date     BREAST SURGERY      lumpectomy benign lump unsure which side, 1990's       Family History   Problem Relation Age of Onset     Other Cancer No family hx of        Social History   Substance Use Topics     Smoking status: Current Every Day Smoker     Packs/day: 0.75     Years: 35.00     Types: Cigarettes     Smokeless tobacco: Never Used     Alcohol use No        History   Drug Use No       Current Outpatient Prescriptions   Medication Sig Dispense Refill     aspirin 81 MG tablet Take 81 mg by mouth         Allergies   Allergen Reactions     Metoprolol      And other beta blockers     ROS  Constitutional - Denies fevers, weight loss, malaise, lethargy  Neuro - Denies tremors or seizures  Pulmon - Denies SOB, dyspnea, hemoptysis, chronic cough or use of an inhaler  CV - Denies CP, SOB, lower extremity edema, difficulty w/ stairs, has never used NTG  GI - Denies hematemesis, BRBPR, melena, chronic diarrhea or epigastric pain   - Denies hematuria, difficulty voiding, h/o STDs  Hematology - Denies blood clotting disorders, chronic anemias  Dermatology - No melanomas or skin cancers  Rheumatology - No h/o RA  Pysch - Denies depression, bipolar d/o or  schizophrenia    Exam:    General - Alert and Oriented X4, NAD, well nourished  HEENT - Normocephalic, atraumatic, PERRLA, Nose midline, Throat without lesions  Neck - supple, no LAD, Thyroid normal, Carotids without bruits  Lungs - Clear to auscultation bilaterally with good inspiratory effort, no tactile fremitus  CV - Heart irregular, no lift's, thrills, murmurs, rubs, or gallops. Carotid, radial, and femoral pulses 2+ bilaterally  Neuro - Full ROM, Strength 5/5 and major muscle groups, sensation intact  Extremities - No cyanosis, clubbing or edema    Assessment and plan: 68-year-old female in need of Port-A-Cath.  Patient is good candidate for placement.  Risks benefits alternatives and complications were discussed with the patient including the possibility of infection bleeding or pneumothorax.  Patient understood and wished to proceed. PATIENT IS CLEARED FOR SURGERY.    Madhav Morrell MD     Again, thank you for allowing me to participate in the care of your patient.        Sincerely,        Madhav Morrell MD

## 2018-09-14 NOTE — MR AVS SNAPSHOT
After Visit Summary   9/14/2018    Jenise Calix    MRN: [id number]           Patient Information     Date Of Birth          1950        Visit Information        Provider Department      9/14/2018 9:00 AM Madhav Morrell MD [facility]        Today's Diagnoses     Malignant neoplasm of anal canal (H)    -  1      Care Instructions    Per Physician's instructions            Follow-ups after your visit        Your next 10 appointments already scheduled     Sep 14, 2018  9:00 AM CDT   New Visit with Madhav Morrell MD   [facility]    [address]  [facility] [address]   [phone]            Sep 20, 2018 10:00 AM CDT   Level 3 with ROOM 8 [facility] ([facility])    [facility]  [address]  [facility] [address]   [phone]            Sep 20, 2018  1:30 PM CDT   Linear Accelerator with Lr [facility] Rad Tech   [facility] ([facility])    [address], [address]  [facility] [address]   [phone]            Sep 21, 2018 11:15 AM CDT   Linear Accelerator with Lr p Rad Tech   Rad[facility] Therapy Center ([facility])    [address], [address]  [facility] [address]   [phone]            Sep 24, 2018 11:15 AM CDT   Linear Accelerator with Lr [facility] Rad Tech   Radiation Therapy Center ([facility])    [address], [address]  [facility] [address]   [phone]            Sep 24, 2018 12:00 PM CDT   Level O with ROOM 4 [facility] ([facility])    n Medical Ctr [facility]  [address]  [facility] [address]   [phone]            Sep 25, 2018 11:15 AM CDT   Linear Accelerator with Lr p Rad Tech   Radiation Therapy Center ([facility])    [address]  [facility] [address]   [phone]            Sep 26, 2018 11:15 AM CDT  "  Linear Accelerator with Lr [facility]   Radiation Therapy Center ([facility])    [address], [address]  [facility] [address]   [phone]            Sep 26, 2018 11:30 AM CDT   ON TREATMENT VISIT with Edmundo Iqbal MD   [facility] ([facility])    [address], [address]  [facility] [address]   [phone]            Sep 27, 2018 11:15 AM CDT   Linear Accelerator with Lr [facility]   Radiation Therapy [place] ([facility])    [address]  [facility] [address]   [phone]              Who to contact     If you have questions or need follow up information about today's clinic visit or your schedule please contact [facility] directly at [phone].  Normal or non-critical lab and imaging results will be communicated to you by MyChart, letter or phone within 4 business days after the clinic has received the results. If you do not hear from us within 7 days, please contact the clinic through [employer] or phone. If you have a critical or abnormal lab result, we will notify you by phone as soon as possible.  Submit refill requests through dineout or call your pharmacy and they will forward the refill request to us. Please allow 3 business days for your refill to be completed.          Additional Information About Your Visit        MyChart Information     dineout lets you send messages to your doctor, view your test results, renew your prescriptions, schedule appointments and more. To sign up, go to [email or web address] . Click on \"Log in\" on the left side of the screen, which will take you to the Welcome page. Then click on \"Sign up Now\" on the right side of the page.     You will be asked to enter the access code listed below, as well as some personal information. Please follow the directions to create your username and password.     Your access code is: [email or web address]  Expires: 11/21/2018 11:02 AM     Your " "access code will  in 90 days. If you need help or a new code, please call your [place] clinic or [phone].        Care EveryWhere ID     This is your Care EveryWhere ID. This could be used by other organizations to access your [place] medical records  [id number]        Your Vitals Were     Pulse Temperature Respirations Height BMI (Body Mass Index)       70 98.4  F (36.9  C) (Oral) 16 1.702 m (5' 7\") 26.47 kg/m2        Blood Pressure from Last 3 Encounters:   18 156/79   18 151/85   18 158/79    Weight from Last 3 Encounters:   18 76.7 kg (169 lb)   18 76.9 kg (169 lb 9.6 oz)   18 77.2 kg (170 lb 3.2 oz)              We Performed the Following     Surgical Case Request General Surgery: INSERT CATHETER VASCULAR ACCESS SINGLE LUMEN        Primary Care Provider Fax #    Physician No Ref-Primary [phone]       No address on file        Equal Access to Services     AGUSTIN HAMILTON : Hadii basilio mcdanielso Soinder, waaxda luqadaha, qaybta kaalmada ademaryanayaadam, katina whitaker . So [facility] [phone].    ATENCIÓN: Si habla español, tiene a sanchez disposición servicios gratuitos de asistencia lingüística. Llame al [phone].    We comply with applicable federal civil rights laws and [place] laws. We do not discriminate on the basis of race, color, national origin, age, disability, sex, sexual orientation, or gender identity.            Thank you!     Thank you for choosing [facility]  for your care. Our goal is always to provide you with excellent care. Hearing back from our patients is one way we can continue to improve our services. Please take a few minutes to complete the written survey that you may receive in the mail after your visit with us. Thank you!             Your Updated Medication List - Protect others around you: Learn how to safely use, store and throw away your medicines at www.disposemymeds.org.          This list is " accurate as of 9/14/18  8:57 AM.  Always use your most recent med list.                   Brand Name Dispense Instructions for use Diagnosis    aspirin 81 MG tablet      Take 81 mg by mouth

## 2018-09-14 NOTE — PROGRESS NOTES
68-year-old female with new diagnosis of anal cancer in need of Port-A-Cath for chemotherapy.  Patient has never had a central line.  Patient has atrial fibrillation but only takes an aspirin daily for this.    Patient Active Problem List   Diagnosis     Malignant neoplasm of anal canal (H)     Chronic atrial fibrillation (H)     Nicotine abuse     Atrial fibrillation (H)       Past Medical History:   Diagnosis Date     Atrial fibrillation (H)      Cancer (H)        Past Surgical History:   Procedure Laterality Date     BREAST SURGERY      lumpectomy benign lump unsure which side, 1990's       Family History   Problem Relation Age of Onset     Other Cancer No family hx of        Social History   Substance Use Topics     Smoking status: Current Every Day Smoker     Packs/day: 0.75     Years: 35.00     Types: Cigarettes     Smokeless tobacco: Never Used     Alcohol use No        History   Drug Use No       Current Outpatient Prescriptions   Medication Sig Dispense Refill     aspirin 81 MG tablet Take 81 mg by mouth         Allergies   Allergen Reactions     Metoprolol      And other beta blockers     ROS  Constitutional - Denies fevers, weight loss, malaise, lethargy  Neuro - Denies tremors or seizures  Pulmon - Denies SOB, dyspnea, hemoptysis, chronic cough or use of an inhaler  CV - Denies CP, SOB, lower extremity edema, difficulty w/ stairs, has never used NTG  GI - Denies hematemesis, BRBPR, melena, chronic diarrhea or epigastric pain   - Denies hematuria, difficulty voiding, h/o STDs  Hematology - Denies blood clotting disorders, chronic anemias  Dermatology - No melanomas or skin cancers  Rheumatology - No h/o RA  Pysch - Denies depression, bipolar d/o or schizophrenia    Exam:    General - Alert and Oriented X4, NAD, well nourished  HEENT - Normocephalic, atraumatic, PERRLA, Nose midline, Throat without lesions  Neck - supple, no LAD, Thyroid normal, Carotids without bruits  Lungs - Clear to auscultation  bilaterally with good inspiratory effort, no tactile fremitus  CV - Heart irregular, no lift's, thrills, murmurs, rubs, or gallops. Carotid, radial, and femoral pulses 2+ bilaterally  Neuro - Full ROM, Strength 5/5 and major muscle groups, sensation intact  Extremities - No cyanosis, clubbing or edema    Assessment and plan: 68-year-old female in need of Port-A-Cath.  Patient is good candidate for placement.  Risks benefits alternatives and complications were discussed with the patient including the possibility of infection bleeding or pneumothorax.  Patient understood and wished to proceed. PATIENT IS CLEARED FOR SURGERY.    Madhav Morrell MD

## 2018-09-14 NOTE — NURSING NOTE
"Chief Complaint   Patient presents with     Consult     port placement       Initial /79 (BP Location: Right arm, Patient Position: Chair, Cuff Size: Adult Regular)  Pulse 70  Temp 98.4  F (36.9  C) (Oral)  Resp 16  Ht 1.702 m (5' 7\")  Wt 76.7 kg (169 lb)  BMI 26.47 kg/m2 Estimated body mass index is 26.47 kg/(m^2) as calculated from the following:    Height as of this encounter: 1.702 m (5' 7\").    Weight as of this encounter: 76.7 kg (169 lb).  Medications and allergies reviewed.    Halima CHARLES, CMA    "

## 2018-09-17 ENCOUNTER — ANESTHESIA EVENT (OUTPATIENT)
Dept: SURGERY | Facility: CLINIC | Age: 68
End: 2018-09-17
Payer: MEDICARE

## 2018-09-18 ENCOUNTER — ANESTHESIA (OUTPATIENT)
Dept: SURGERY | Facility: CLINIC | Age: 68
End: 2018-09-18
Payer: MEDICARE

## 2018-09-18 ENCOUNTER — APPOINTMENT (OUTPATIENT)
Dept: GENERAL RADIOLOGY | Facility: CLINIC | Age: 68
End: 2018-09-18
Attending: SURGERY
Payer: MEDICARE

## 2018-09-18 ENCOUNTER — SURGERY (OUTPATIENT)
Age: 68
End: 2018-09-18

## 2018-09-18 ENCOUNTER — HOSPITAL ENCOUNTER (OUTPATIENT)
Facility: CLINIC | Age: 68
Discharge: HOME | End: 2018-09-18
Attending: SURGERY | Admitting: SURGERY
Payer: MEDICARE

## 2018-09-18 VITALS
HEIGHT: 67 IN | HEART RATE: 61 BPM | WEIGHT: 169 LBS | TEMPERATURE: 98 F | SYSTOLIC BLOOD PRESSURE: 143 MMHG | OXYGEN SATURATION: 98 % | BODY MASS INDEX: 26.53 KG/M2 | RESPIRATION RATE: 16 BRPM | DIASTOLIC BLOOD PRESSURE: 57 MMHG

## 2018-09-18 DIAGNOSIS — G89.18 POST-OP PAIN: Primary | ICD-10-CM

## 2018-09-18 PROCEDURE — 25000125 ZZHC RX 250: Performed by: SURGERY

## 2018-09-18 PROCEDURE — 25000128 H RX IP 250 OP 636: Performed by: NURSE ANESTHETIST, CERTIFIED REGISTERED

## 2018-09-18 PROCEDURE — 25000125 ZZHC RX 250: Performed by: NURSE ANESTHETIST, CERTIFIED REGISTERED

## 2018-09-18 PROCEDURE — 27210794 ZZH OR GENERAL SUPPLY STERILE: Performed by: SURGERY

## 2018-09-18 PROCEDURE — C1788 PORT, INDWELLING, IMP: HCPCS | Performed by: SURGERY

## 2018-09-18 PROCEDURE — 36561 INSERT TUNNELED CV CATH: CPT | Performed by: SURGERY

## 2018-09-18 PROCEDURE — 25000128 H RX IP 250 OP 636: Performed by: SURGERY

## 2018-09-18 PROCEDURE — 40000277 XR SURGERY CARM FLUORO LESS THAN 5 MIN W STILLS

## 2018-09-18 PROCEDURE — 36000052 ZZH SURGERY LEVEL 2 EA 15 ADDTL MIN: Performed by: SURGERY

## 2018-09-18 PROCEDURE — 40000306 ZZH STATISTIC PRE PROC ASSESS II: Performed by: SURGERY

## 2018-09-18 PROCEDURE — 36000054 ZZH SURGERY LEVEL 2 W FLUORO 1ST 30 MIN: Performed by: SURGERY

## 2018-09-18 PROCEDURE — 40000986 XR CHEST PORT 1 VW

## 2018-09-18 PROCEDURE — 71000027 ZZH RECOVERY PHASE 2 EACH 15 MINS: Performed by: SURGERY

## 2018-09-18 PROCEDURE — 37000008 ZZH ANESTHESIA TECHNICAL FEE, 1ST 30 MIN: Performed by: SURGERY

## 2018-09-18 PROCEDURE — 77001 FLUOROGUIDE FOR VEIN DEVICE: CPT | Mod: 26 | Performed by: SURGERY

## 2018-09-18 PROCEDURE — 37000009 ZZH ANESTHESIA TECHNICAL FEE, EACH ADDTL 15 MIN: Performed by: SURGERY

## 2018-09-18 DEVICE — CATH PORT MRI POWERPORT 8FR SL 1808000: Type: IMPLANTABLE DEVICE | Site: CHEST  WALL | Status: FUNCTIONAL

## 2018-09-18 RX ORDER — FENTANYL CITRATE 50 UG/ML
INJECTION, SOLUTION INTRAMUSCULAR; INTRAVENOUS PRN
Status: DISCONTINUED | OUTPATIENT
Start: 2018-09-18 | End: 2018-09-18

## 2018-09-18 RX ORDER — LIDOCAINE HYDROCHLORIDE 10 MG/ML
INJECTION, SOLUTION INFILTRATION; PERINEURAL PRN
Status: DISCONTINUED | OUTPATIENT
Start: 2018-09-18 | End: 2018-09-18 | Stop reason: HOSPADM

## 2018-09-18 RX ORDER — HEPARIN SODIUM 1000 [USP'U]/ML
INJECTION, SOLUTION INTRAVENOUS; SUBCUTANEOUS PRN
Status: DISCONTINUED | OUTPATIENT
Start: 2018-09-18 | End: 2018-09-18 | Stop reason: HOSPADM

## 2018-09-18 RX ORDER — DEXAMETHASONE SODIUM PHOSPHATE 4 MG/ML
INJECTION, SOLUTION INTRA-ARTICULAR; INTRALESIONAL; INTRAMUSCULAR; INTRAVENOUS; SOFT TISSUE PRN
Status: DISCONTINUED | OUTPATIENT
Start: 2018-09-18 | End: 2018-09-18

## 2018-09-18 RX ORDER — LIDOCAINE 40 MG/G
CREAM TOPICAL
Status: DISCONTINUED | OUTPATIENT
Start: 2018-09-18 | End: 2018-09-18 | Stop reason: HOSPADM

## 2018-09-18 RX ORDER — BUPIVACAINE HYDROCHLORIDE AND EPINEPHRINE 5; 5 MG/ML; UG/ML
INJECTION, SOLUTION PERINEURAL PRN
Status: DISCONTINUED | OUTPATIENT
Start: 2018-09-18 | End: 2018-09-18 | Stop reason: HOSPADM

## 2018-09-18 RX ORDER — HYDROCODONE BITARTRATE AND ACETAMINOPHEN 5; 325 MG/1; MG/1
1-2 TABLET ORAL EVERY 6 HOURS PRN
Qty: 20 TABLET | Refills: 0 | Status: ON HOLD | OUTPATIENT
Start: 2018-09-18 | End: 2018-10-25

## 2018-09-18 RX ORDER — HYDROCODONE BITARTRATE AND ACETAMINOPHEN 5; 325 MG/1; MG/1
1-2 TABLET ORAL EVERY 4 HOURS PRN
Status: DISCONTINUED | OUTPATIENT
Start: 2018-09-18 | End: 2018-09-18 | Stop reason: HOSPADM

## 2018-09-18 RX ORDER — SODIUM CHLORIDE, SODIUM LACTATE, POTASSIUM CHLORIDE, CALCIUM CHLORIDE 600; 310; 30; 20 MG/100ML; MG/100ML; MG/100ML; MG/100ML
INJECTION, SOLUTION INTRAVENOUS CONTINUOUS
Status: DISCONTINUED | OUTPATIENT
Start: 2018-09-18 | End: 2018-09-18 | Stop reason: HOSPADM

## 2018-09-18 RX ORDER — ONDANSETRON 2 MG/ML
INJECTION INTRAMUSCULAR; INTRAVENOUS PRN
Status: DISCONTINUED | OUTPATIENT
Start: 2018-09-18 | End: 2018-09-18

## 2018-09-18 RX ORDER — CEFAZOLIN SODIUM 2 G/100ML
2 INJECTION, SOLUTION INTRAVENOUS
Status: COMPLETED | OUTPATIENT
Start: 2018-09-18 | End: 2018-09-18

## 2018-09-18 RX ORDER — LIDOCAINE HYDROCHLORIDE 10 MG/ML
INJECTION, SOLUTION INFILTRATION; PERINEURAL PRN
Status: DISCONTINUED | OUTPATIENT
Start: 2018-09-18 | End: 2018-09-18

## 2018-09-18 RX ORDER — NALOXONE HYDROCHLORIDE 0.4 MG/ML
.1-.4 INJECTION, SOLUTION INTRAMUSCULAR; INTRAVENOUS; SUBCUTANEOUS
Status: DISCONTINUED | OUTPATIENT
Start: 2018-09-18 | End: 2018-09-18 | Stop reason: HOSPADM

## 2018-09-18 RX ORDER — PROPOFOL 10 MG/ML
INJECTION, EMULSION INTRAVENOUS CONTINUOUS PRN
Status: DISCONTINUED | OUTPATIENT
Start: 2018-09-18 | End: 2018-09-18

## 2018-09-18 RX ORDER — CEFAZOLIN SODIUM 1 G/3ML
1 INJECTION, POWDER, FOR SOLUTION INTRAMUSCULAR; INTRAVENOUS SEE ADMIN INSTRUCTIONS
Status: DISCONTINUED | OUTPATIENT
Start: 2018-09-18 | End: 2018-09-18 | Stop reason: HOSPADM

## 2018-09-18 RX ADMIN — LIDOCAINE HYDROCHLORIDE 1 ML: 10 INJECTION, SOLUTION EPIDURAL; INFILTRATION; INTRACAUDAL; PERINEURAL at 11:57

## 2018-09-18 RX ADMIN — DEXAMETHASONE SODIUM PHOSPHATE 6 MG: 4 INJECTION, SOLUTION INTRA-ARTICULAR; INTRALESIONAL; INTRAMUSCULAR; INTRAVENOUS; SOFT TISSUE at 12:34

## 2018-09-18 RX ADMIN — FENTANYL CITRATE 50 MCG: 50 INJECTION, SOLUTION INTRAMUSCULAR; INTRAVENOUS at 12:30

## 2018-09-18 RX ADMIN — FENTANYL CITRATE 50 MCG: 50 INJECTION, SOLUTION INTRAMUSCULAR; INTRAVENOUS at 12:27

## 2018-09-18 RX ADMIN — LIDOCAINE HYDROCHLORIDE 8 ML: 10 INJECTION, SOLUTION INFILTRATION; PERINEURAL at 12:50

## 2018-09-18 RX ADMIN — CEFAZOLIN SODIUM 2 G: 2 INJECTION, SOLUTION INTRAVENOUS at 12:27

## 2018-09-18 RX ADMIN — LIDOCAINE HYDROCHLORIDE 50 MG: 10 INJECTION, SOLUTION INFILTRATION; PERINEURAL at 12:31

## 2018-09-18 RX ADMIN — HEPARIN SODIUM 4 ML: 1000 INJECTION, SOLUTION INTRAVENOUS; SUBCUTANEOUS at 12:50

## 2018-09-18 RX ADMIN — BUPIVACAINE HYDROCHLORIDE AND EPINEPHRINE BITARTRATE 8 ML: 5; .005 INJECTION, SOLUTION PERINEURAL at 12:50

## 2018-09-18 RX ADMIN — ONDANSETRON 4 MG: 2 INJECTION INTRAMUSCULAR; INTRAVENOUS at 12:34

## 2018-09-18 RX ADMIN — SODIUM CHLORIDE, POTASSIUM CHLORIDE, SODIUM LACTATE AND CALCIUM CHLORIDE: 600; 310; 30; 20 INJECTION, SOLUTION INTRAVENOUS at 11:56

## 2018-09-18 RX ADMIN — PROPOFOL 100 MCG/KG/MIN: 10 INJECTION, EMULSION INTRAVENOUS at 12:31

## 2018-09-18 RX ADMIN — MIDAZOLAM 2 MG: 1 INJECTION INTRAMUSCULAR; INTRAVENOUS at 12:27

## 2018-09-18 ASSESSMENT — LIFESTYLE VARIABLES: TOBACCO_USE: 1

## 2018-09-18 ASSESSMENT — ENCOUNTER SYMPTOMS: DYSRHYTHMIAS: 1

## 2018-09-18 NOTE — ANESTHESIA CARE TRANSFER NOTE
Patient: Jenise Calix    Procedure(s):  portacath placement - Wound Class: I-Clean    Diagnosis: vascular access  Diagnosis Additional Information: No value filed.    Anesthesia Type:   MAC     Note:  Airway :Room Air  Patient transferred to:Phase II  Comments: Patient's VSS. Spontaneous respirations. Patient awake and oriented. IV patent. Report to GABRIEL Trinh.Handoff Report: Identifed the Patient, Identified the Reponsible Provider, Reviewed the pertinent medical history, Discussed the surgical course, Reviewed Intra-OP anesthesia mangement and issues during anesthesia, Set expectations for post-procedure period and Allowed opportunity for questions and acknowledgement of understanding      Vitals: (Last set prior to Anesthesia Care Transfer)    CRNA VITALS  9/18/2018 1230 - 9/18/2018 1305      9/18/2018             Pulse: 60    SpO2: 99 %                Electronically Signed By: NELY Aguero CRNA  September 18, 2018  1:05 PM

## 2018-09-18 NOTE — ANESTHESIA POSTPROCEDURE EVALUATION
Patient: Jenise Calix    Procedure(s):  portacath placement - Wound Class: I-Clean    Diagnosis:vascular access  Diagnosis Additional Information: No value filed.    Anesthesia Type:  MAC    Note:  Anesthesia Post Evaluation    Patient location during evaluation: Phase 2 and Bedside  Patient participation: Able to fully participate in evaluation  Level of consciousness: awake and alert  Pain management: adequate  Airway patency: patent  Cardiovascular status: acceptable  Respiratory status: acceptable  Hydration status: acceptable  PONV: none     Anesthetic complications: None          Last vitals:  Vitals:    09/18/18 1118   BP: 147/60   Pulse: 61   Resp: 18   Temp: 37.1  C (98.8  F)   SpO2: 98%         Electronically Signed By: NELY Aguero CRNA  September 18, 2018  1:06 PM

## 2018-09-18 NOTE — IP AVS SNAPSHOT
MRN:[id number]                      After Visit Summary   9/18/2018    Jenise Calix    MRN: [id number]           Thank you!     Thank you for choosing [facility] for your care. Our goal is always to provide you with excellent care. Hearing back from our patients is one way we can continue to improve our services. Please take a few minutes to complete the written survey that you may receive in the mail after you visit with us. Thank you!        Patient Information     Date Of Birth          1950        About your hospital stay     You were admitted on:  September 18, 2018 You last received care in the:  [facility] PreOP/Phase II    You were discharged on:  September 18, 2018       Who to Call     For medical emergencies, please call 911.  For non-urgent questions about your medical care, please call your primary care provider or clinic, None  For questions related to your surgery, please call your surgery clinic        Attending Provider     Provider Specialty    Madhav Morrell MD Surgery       Primary Care Provider Fax #    Physician No Ref-Primary [phone]      Your next 10 appointments already scheduled     Sep 20, 2018 10:00 AM CDT   Level 3 with ROOM 8 [facility] ([facility])    [facility]  [address] [place] [address]  [facility] [address]   [phone]            Sep 20, 2018  1:30 PM CDT   Linear Accelerator with Lr [facility]   [facility] ([facility])    [address], [address]  [facility] [address]   [phone]            Sep 21, 2018 11:15 AM CDT   Linear Accelerator with Lr [facility]   [facility] Center ([facility])    [address], [address]  [facility] [address]   [phone]            Sep 24, 2018 11:15 AM CDT   Linear Accelerator with Lr [facility]   [facility] Center ([facility])    [address], [address]  [facility]  [address]   [phone]            Sep 24, 2018 12:00 PM CDT   Level O with ROOM 4 [facility] [place] [facility] ([facility])    [facility]  [address]  [place] [address]   [phone]            Sep 25, 2018 11:15 AM CDT   Linear Accelerator with Lr [facility] Tech   Radiation Therapy Center ([facility])    [address], [address]  [facility] [address]   [phone]            Sep 26, 2018 11:15 AM CDT   Linear Accelerator with Lr [facility] Rad Tech   [facility] ([facility])    [address], [address]  [place] [address]   [phone]            Sep 26, 2018 11:30 AM CDT   ON TREATMENT VISIT with Edmundo Iqbal MD   [facility] ([facility])    [address], [address]  [place] [address]   [phone]            Sep 27, 2018 11:15 AM CDT   Linear Accelerator with Lr [facility] Rad Tech   [facility] ([facility])    [address]  [place] [address]   [phone]            Sep 28, 2018 11:15 AM CDT   Linear Accelerator with Lr [facility] Rad Tech   [facility] ([facility])    [address]  [facility] [address]   [phone]              Further instructions from your care team                           Same Day Surgery Discharge Instructions  Special Precautions After Surgery - Adult    1. It is not unusual to feel lightheaded or faint, up to 24 hours after surgery or while taking pain medication.  If you have these symptoms; sit for a few minutes before standing and have someone assist you when getting up.  2. You should rest and relax for the next 24 hours and must have someone stay with you for at least 24 hours after your discharge.  3. DO NOT DRIVE any vehicle or operate mechanical equipment for 24 hours following the end of your surgery.  DO NOT DRIVE while taking narcotic pain medications that have been  prescribed by your physician.  If you had a limb operated on, you must be able to use it fully to drive.  4. DO NOT drink alcoholic beverages for 24 hours following surgery or while taking prescription pain medication.  5. Drink clear liquids (apple juice, ginger ale, broth, 7-Up, etc.).  Progress to your regular diet as you feel able.  6. Any questions call your physician and do not make important decisions for 24 hours.           __________________________________________________________________________________________________________________________________  IMPORTANT NUMBERS:    [facility] Main Number:  [phone], [phone]  Pharmacy:  [phone]  Same Day Surgery:  [phone], Monday - Friday until 8:30 p.m.    [facility]:  [phone]                                                                             Surgery Specialty Clinic:  [phone]       DISCHARGE INSTRUCTIONS     1. You may resume your regular diet when you feel you are ready    2. OK to use Portacath.    3. You will have some discomfort at the incision sites. This is expected. This should improve over the next 2-3 days. Ice and pain medication will help with this pain. Use prescribed pain medication as instructed.     4. Some bruising and mild swelling is normal after surgery. The area below and around the incision(s) may be hard and elevated. This is part of the normal healing process. This will resolve slowly over the next several months.     5. Your wounds are covered with glue. The glue is water tight and so you can shower or bathe immediately following surgery.     6. Use the following medications (in addition to your normal meds) as shown:      Tylenol (acetaminophen) 500 mg every 6 hours as needed for pain.    Do not take more than 1000 mg of Tylenol every 6 hours -OR- 4g in a day    Motrin (ibuprophen) 600 mg every 6 hours as needed for pain. Take with food.     8. Notify Clinic at [phone] if:     Your  "discomfort is not relieved by your pain medication     You have signs of infection such as temperature above 100.4 degrees orally,  chills, or increasing daily discomfort.     Incision site is becoming more red and/or there is purulent drainage.      9. Follow up with Infusion nurses or Dr Morrell in 1-2 weeks.    10. When taking narcotics it is important to keep your stools soft to avoid constipation and pain with straining. This is best done by drinking and taking a stool softener (Metamucil or milk of magnesia).        Pending Results     Date and Time Order Name Status Description    2018 1302 XR Chest Port 1 View In process     2018 0037 XR Surgery VISH Fluoro L/T 5 Min w Stills In process             Admission Information     Date & Time Provider Department Dept. Phone    2018 Madhav Morrell MD [facility] PreOP/Phase [phone]      Your Vitals Were     Blood Pressure Pulse Temperature Respirations Height Weight    142/54 61 98  F (36.7  C) (Oral) 18 1.702 m (5' 7\") 76.7 kg (169 lb)    Pulse Oximetry BMI (Body Mass Index)                99% 26.47 kg/m2          MyChart Information     [facility] lets you send messages to your doctor, view your test results, renew your prescriptions, schedule appointments and more. To sign up, go to [email or web address] . Click on \"Log in\" on the left side of the screen, which will take you to the Welcome page. Then click on \"Sign up Now\" on the right side of the page.     You will be asked to enter the access code listed below, as well as some personal information. Please follow the directions to create your username and password.     Your access code is: [email or web address]  Expires: 2018 11:02 AM     Your access code will  in 90 days. If you need help or a new code, please call your [place] clinic or [phone].        Care EveryWhere ID     This is your Care EveryWhere ID. This could be used by other organizations to access your [place] medical " records  [id number]        Equal Access to Services     [facility] Hospital[facility] : Hadii basilio mcqueen enedeliaguero Ibrahimaali, wajomarda larissaadaha, qadaveta conradoarielkatina tuttlerobynbrianne alcaraz. So [facility] [phone].    ATENCIÓN: Si habla español, tiene a sanchez disposición servicios gratuitos de asistencia lingüística. Chalino al [phone].    We comply with applicable federal civil rights laws and [place] laws. We do not discriminate on the basis of race, color, national origin, age, disability, sex, sexual orientation, or gender identity.               Review of your medicines      START taking        Dose / Directions    HYDROcodone-acetaminophen 5-325 MG per tablet   Commonly known as:  NORCO   Used for:  Post-op pain        Dose:  1-2 tablet   Take 1-2 tablets by mouth every 6 hours as needed for pain   Quantity:  20 tablet   Refills:  0         CONTINUE these medicines which have NOT CHANGED        Dose / Directions    aspirin 81 MG tablet        Dose:  81 mg   Take 81 mg by mouth   Refills:  0            Where to get your medicines      Some of these will need a paper prescription and others can be bought over the counter. Ask your nurse if you have questions.     Bring a paper prescription for each of these medications     HYDROcodone-acetaminophen 5-325 MG per tablet                Protect others around you: Learn how to safely use, store and throw away your medicines at www.disposemymeds.org.        Information about OPIOIDS     PRESCRIPTION OPIOIDS: WHAT YOU NEED TO KNOW   We gave you an opioid (narcotic) pain medicine. It is important to manage your pain, but opioids are not always the best choice. You should first try all the other options your care team gave you. Take this medicine for as short a time (and as few doses) as possible.    Some activities can increase your pain, such as bandage changes or therapy sessions. It may help to take your pain medicine 30 to 60 minutes before these activities. Reduce  your stress by getting enough sleep, working on hobbies you enjoy and practicing relaxation or meditation. Talk to your care team about ways to manage your pain beyond prescription opioids.    These medicines have risks:    DO NOT drive when on new or higher doses of pain medicine. These medicines can affect your alertness and reaction times, and you could be arrested for driving under the influence (DUI). If you need to use opioids long-term, talk to your care team about driving.    DO NOT operate heavy machinery    DO NOT do any other dangerous activities while taking these medicines.    DO NOT drink any alcohol while taking these medicines.     If the opioid prescribed includes acetaminophen, DO NOT take with any other medicines that contain acetaminophen. Read all labels carefully. Look for the word  acetaminophen  or  Tylenol.  Ask your pharmacist if you have questions or are unsure.    You can get addicted to pain medicines, especially if you have a history of addiction (chemical, alcohol or substance dependence). Talk to your care team about ways to reduce this risk.    All opioids tend to cause constipation. Drink plenty of water and eat foods that have a lot of fiber, such as fruits, vegetables, prune juice, apple juice and high-fiber cereal. Take a laxative (Miralax, milk of magnesia, Colace, Senna) if you don t move your bowels at least every other day. Other side effects include upset stomach, sleepiness, dizziness, throwing up, tolerance (needing more of the medicine to have the same effect), physical dependence and slowed breathing.    Store your pills in a secure place, locked if possible. We will not replace any lost or stolen medicine. If you don t finish your medicine, please throw away (dispose) as directed by your pharmacist. The [place] [employer]ution [employer] has more information about safe disposal: https://www.pca.[facility].mn.us/living-green/managing-unwanted-medications              Medication List: This is a list of all your medications and when to take them. Check marks below indicate your daily home schedule. Keep this list as a reference.      Medications           Morning Afternoon Evening Bedtime As Needed    aspirin 81 MG tablet   Take 81 mg by mouth                                HYDROcodone-acetaminophen 5-325 MG per tablet   Commonly known as:  NORCO   Take 1-2 tablets by mouth every 6 hours as needed for pain

## 2018-09-18 NOTE — DISCHARGE INSTRUCTIONS
Same Day Surgery Discharge Instructions  Special Precautions After Surgery - Adult    1. It is not unusual to feel lightheaded or faint, up to 24 hours after surgery or while taking pain medication.  If you have these symptoms; sit for a few minutes before standing and have someone assist you when getting up.  2. You should rest and relax for the next 24 hours and must have someone stay with you for at least 24 hours after your discharge.  3. DO NOT DRIVE any vehicle or operate mechanical equipment for 24 hours following the end of your surgery.  DO NOT DRIVE while taking narcotic pain medications that have been prescribed by your physician.  If you had a limb operated on, you must be able to use it fully to drive.  4. DO NOT drink alcoholic beverages for 24 hours following surgery or while taking prescription pain medication.  5. Drink clear liquids (apple juice, ginger ale, broth, 7-Up, etc.).  Progress to your regular diet as you feel able.  6. Any questions call your physician and do not make important decisions for 24 hours.           __________________________________________________________________________________________________________________________________  IMPORTANT NUMBERS:    [facility] Main Number:  [phone], [phone]  Pharmacy:  [phone]  Same Day Surgery:  [phone], Monday - Friday until 8:30 p.m.    [facility]:  [phone]                                                                             Surgery Specialty Clinic:  [phone]       DISCHARGE INSTRUCTIONS     1. You may resume your regular diet when you feel you are ready    2. OK to use Portacath.    3. You will have some discomfort at the incision sites. This is expected. This should improve over the next 2-3 days. Ice and pain medication will help with this pain. Use prescribed pain medication as instructed.     4. Some bruising and mild swelling is normal after surgery. The area below and  around the incision(s) may be hard and elevated. This is part of the normal healing process. This will resolve slowly over the next several months.     5. Your wounds are covered with glue. The glue is water tight and so you can shower or bathe immediately following surgery.     6. Use the following medications (in addition to your normal meds) as shown:      Tylenol (acetaminophen) 500 mg every 6 hours as needed for pain.    Do not take more than 1000 mg of Tylenol every 6 hours -OR- 4g in a day    Motrin (ibuprophen) 600 mg every 6 hours as needed for pain. Take with food.     8. Notify Clinic at [phone] if:     Your discomfort is not relieved by your pain medication     You have signs of infection such as temperature above 100.4 degrees orally,  chills, or increasing daily discomfort.     Incision site is becoming more red and/or there is purulent drainage.      9. Follow up with Infusion nurses or Dr Morrell in 1-2 weeks.    10. When taking narcotics it is important to keep your stools soft to avoid constipation and pain with straining. This is best done by drinking and taking a stool softener (Metamucil or milk of magnesia).

## 2018-09-18 NOTE — IP AVS SNAPSHOT
[facility] PreOP/Phase II    [address] [address]    Phone:  [phone]    Fax:  [phone]                                       After Visit Summary   9/18/2018    Jenise Calix    MRN: [id number]           After Visit Summary Signature Page     I have received my discharge instructions, and my questions have been answered. I have discussed any challenges I see with this plan with the nurse or doctor.    ..........................................................................................................................................  Patient/Patient Representative Signature      ..........................................................................................................................................  Patient Representative Print Name and Relationship to Patient    ..................................................               ................................................  Date                                   Time    ..........................................................................................................................................  Reviewed by Signature/Title    ...................................................              ..............................................  Date                                               Time          22EPIC Rev 08/18

## 2018-09-18 NOTE — ANESTHESIA PREPROCEDURE EVALUATION
Anesthesia Evaluation     . Pt has had prior anesthetic. Type: General    No history of anesthetic complications          ROS/MED HX    ENT/Pulmonary:     (+)tobacco use, Current use 0.5 packs/day  , . .    Neurologic:  - neg neurologic ROS     Cardiovascular:     (+) ----. : . . . :. dysrhythmias a-fib, .       METS/Exercise Tolerance:  >4 METS   Hematologic:  - neg hematologic  ROS       Musculoskeletal:  - neg musculoskeletal ROS       GI/Hepatic:  - neg GI/hepatic ROS       Renal/Genitourinary:  - ROS Renal section negative       Endo:  - neg endo ROS       Psychiatric:  - neg psychiatric ROS       Infectious Disease:  - neg infectious disease ROS       Malignancy:   (+) Malignancy History of GI  GI CA Active status post,         Other:                     Physical Exam  Normal systems: cardiovascular and pulmonary    Airway   Mallampati: I  TM distance: >3 FB  Neck ROM: full    Dental   (+) upper dentures, lower dentures and partials    Cardiovascular       Pulmonary                     Anesthesia Plan      History & Physical Review  History and physical reviewed and following examination; no interval change.    ASA Status:  3 .    NPO Status:  > 8 hours    Plan for MAC Reason for MAC:  Deep or markedly invasive procedure (G8) and Procedure to face, neck, head or breast  PONV prophylaxis:  Ondansetron (or other 5HT-3) and Dexamethasone or Solumedrol       Postoperative Care  Postoperative pain management:  IV analgesics and Oral pain medications.      Consents  Anesthetic plan, risks, benefits and alternatives discussed with:  Patient..                          .

## 2018-09-18 NOTE — OP NOTE
Preop diagnosis: Need for vascular access    Postop diagnosis: Same    Procedure: Port-A-Cath placement    Surgeon: Petros    Assistant surgeon: Chau Randolph PA-C (needed for expertise in retraction hemostasis and suctioning)    Anesthesia: Monitored anesthesia care [facility] CRNA    Procedure: Patient's anterior chest and neck were cleaned and draped in a sterile manner.  1/4% Marcaine with epinephrine used to anesthetize the skin subcutaneous tissue and clavicle and left anterior chest.  Patient placed into Trendelenburg position and needle inserted into left subclavian vein with flashback of dark red blood.  A wire guided smoothly over the needle and on fluoroscopy wire was noted to be in superior vena cava.  Subcutaneous pocket anesthetized on left anterior chest and 4 cm transverse incision made.  Pocket dissected out bluntly and a small amount of bleeding controlled with electrocautery.  Catheter tunneled from wire exit point to pocket.  Patient placed back into Trendelenburg position.  Subcutaneous dilator and introducer sheath placed over the wire moving the dilator and wire, leaving the introducer sheath.  Catheter threaded into introducer sheath without difficulty which was broken free and removed from the body.  On fluoroscopy, catheter tip was noted to be in the right ventricle it was pulled back until it was at the right atrial superior vena cava junction.  Catheter then cut to the appropriate length and attached to the port.  Port stitched into the subcutaneous pocket using 2 interrupted 0 Prolene sutures.  Port maya back dark red blood and flushed with ease.  Final flush solution consisting of 1000 units of heparin per cc were then injected into the port.  A total of 4 cc of this solution was used.  Final fluoroscopy shows catheter tip in superior vena cava with smooth trajectory back to the port.  Subcutaneous pocket closed using interrupted deep dermal 3-0 Vicryl sutures and a 4-0 Vicryl running  subcuticular stitch.  Wire exit point also closed using 4-0 Vicryl running subcuticular stitch.  Both incisions dressed with Dermabond.    Estimate blood loss: 1 mL    IV fluid: 500 mL

## 2018-09-20 ENCOUNTER — APPOINTMENT (OUTPATIENT)
Dept: RADIATION THERAPY | Facility: OUTPATIENT CENTER | Age: 68
End: 2018-09-20
Payer: COMMERCIAL

## 2018-09-20 ENCOUNTER — INFUSION THERAPY VISIT (OUTPATIENT)
Dept: INFUSION THERAPY | Facility: CLINIC | Age: 68
End: 2018-09-20
Attending: INTERNAL MEDICINE
Payer: MEDICARE

## 2018-09-20 ENCOUNTER — HOME INFUSION (PRE-WILLOW HOME INFUSION) (OUTPATIENT)
Dept: PHARMACY | Facility: CLINIC | Age: 68
End: 2018-09-20

## 2018-09-20 ENCOUNTER — HOSPITAL ENCOUNTER (OUTPATIENT)
Facility: CLINIC | Age: 68
Discharge: HOME | End: 2018-09-20
Attending: INTERNAL MEDICINE | Admitting: INTERNAL MEDICINE
Payer: MEDICARE

## 2018-09-20 VITALS
DIASTOLIC BLOOD PRESSURE: 72 MMHG | HEART RATE: 65 BPM | SYSTOLIC BLOOD PRESSURE: 179 MMHG | RESPIRATION RATE: 16 BRPM | TEMPERATURE: 97.2 F

## 2018-09-20 DIAGNOSIS — C21.1 MALIGNANT NEOPLASM OF ANAL CANAL (H): Primary | ICD-10-CM

## 2018-09-20 LAB
ALBUMIN SERPL-MCNC: 3.7 G/DL (ref 3.4–5)
ALP SERPL-CCNC: 101 U/L (ref 40–150)
ALT SERPL W P-5'-P-CCNC: 12 U/L (ref 0–50)
ANION GAP SERPL CALCULATED.3IONS-SCNC: 5 MMOL/L (ref 3–14)
AST SERPL W P-5'-P-CCNC: 11 U/L (ref 0–45)
BASOPHILS # BLD AUTO: 0 10E9/L (ref 0–0.2)
BASOPHILS NFR BLD AUTO: 0.3 %
BILIRUB SERPL-MCNC: 0.6 MG/DL (ref 0.2–1.3)
BUN SERPL-MCNC: 14 MG/DL (ref 7–30)
CALCIUM SERPL-MCNC: 9.8 MG/DL (ref 8.5–10.1)
CHLORIDE SERPL-SCNC: 106 MMOL/L (ref 94–109)
CO2 SERPL-SCNC: 28 MMOL/L (ref 20–32)
CREAT SERPL-MCNC: 0.67 MG/DL (ref 0.52–1.04)
DIFFERENTIAL METHOD BLD: ABNORMAL
EOSINOPHIL # BLD AUTO: 0 10E9/L (ref 0–0.7)
EOSINOPHIL NFR BLD AUTO: 0.2 %
ERYTHROCYTE [DISTWIDTH] IN BLOOD BY AUTOMATED COUNT: 12.4 % (ref 10–15)
GFR SERPL CREATININE-BSD FRML MDRD: 88 ML/MIN/1.7M2
GLUCOSE SERPL-MCNC: 73 MG/DL (ref 70–99)
HCT VFR BLD AUTO: 46.6 % (ref 35–47)
HGB BLD-MCNC: 15.6 G/DL (ref 11.7–15.7)
IMM GRANULOCYTES # BLD: 0 10E9/L (ref 0–0.4)
IMM GRANULOCYTES NFR BLD: 0.3 %
LYMPHOCYTES # BLD AUTO: 2.6 10E9/L (ref 0.8–5.3)
LYMPHOCYTES NFR BLD AUTO: 22.3 %
MCH RBC QN AUTO: 32.5 PG (ref 26.5–33)
MCHC RBC AUTO-ENTMCNC: 33.5 G/DL (ref 31.5–36.5)
MCV RBC AUTO: 97 FL (ref 78–100)
MONOCYTES # BLD AUTO: 0.7 10E9/L (ref 0–1.3)
MONOCYTES NFR BLD AUTO: 5.7 %
NEUTROPHILS # BLD AUTO: 8.3 10E9/L (ref 1.6–8.3)
NEUTROPHILS NFR BLD AUTO: 71.2 %
NRBC # BLD AUTO: 0 10*3/UL
NRBC BLD AUTO-RTO: 0 /100
PLATELET # BLD AUTO: 198 10E9/L (ref 150–450)
POTASSIUM SERPL-SCNC: 3.9 MMOL/L (ref 3.4–5.3)
PROT SERPL-MCNC: 7.2 G/DL (ref 6.8–8.8)
RBC # BLD AUTO: 4.8 10E12/L (ref 3.8–5.2)
SODIUM SERPL-SCNC: 139 MMOL/L (ref 133–144)
WBC # BLD AUTO: 11.7 10E9/L (ref 4–11)

## 2018-09-20 PROCEDURE — 85025 COMPLETE CBC W/AUTO DIFF WBC: CPT | Performed by: INTERNAL MEDICINE

## 2018-09-20 PROCEDURE — 96409 CHEMO IV PUSH SNGL DRUG: CPT

## 2018-09-20 PROCEDURE — 25000128 H RX IP 250 OP 636: Performed by: INTERNAL MEDICINE

## 2018-09-20 PROCEDURE — 25000125 ZZHC RX 250: Performed by: INTERNAL MEDICINE

## 2018-09-20 PROCEDURE — 80053 COMPREHEN METABOLIC PANEL: CPT | Performed by: INTERNAL MEDICINE

## 2018-09-20 PROCEDURE — G0498 CHEMO EXTEND IV INFUS W/PUMP: HCPCS

## 2018-09-20 PROCEDURE — 96367 TX/PROPH/DG ADDL SEQ IV INF: CPT

## 2018-09-20 PROCEDURE — 96375 TX/PRO/DX INJ NEW DRUG ADDON: CPT

## 2018-09-20 RX ORDER — ALBUTEROL SULFATE 90 UG/1
1-2 AEROSOL, METERED RESPIRATORY (INHALATION)
Status: CANCELLED
Start: 2018-09-20

## 2018-09-20 RX ORDER — MEPERIDINE HYDROCHLORIDE 50 MG/ML
25 INJECTION INTRAMUSCULAR; INTRAVENOUS; SUBCUTANEOUS EVERY 30 MIN PRN
Status: CANCELLED | OUTPATIENT
Start: 2018-09-20

## 2018-09-20 RX ORDER — SODIUM CHLORIDE 9 MG/ML
1000 INJECTION, SOLUTION INTRAVENOUS CONTINUOUS PRN
Status: CANCELLED
Start: 2018-10-18

## 2018-09-20 RX ORDER — METHYLPREDNISOLONE SODIUM SUCCINATE 125 MG/2ML
125 INJECTION, POWDER, LYOPHILIZED, FOR SOLUTION INTRAMUSCULAR; INTRAVENOUS
Status: CANCELLED
Start: 2018-10-18

## 2018-09-20 RX ORDER — LORAZEPAM 2 MG/ML
0.5 INJECTION INTRAMUSCULAR EVERY 4 HOURS PRN
Status: CANCELLED
Start: 2018-09-20

## 2018-09-20 RX ORDER — MEPERIDINE HYDROCHLORIDE 50 MG/ML
25 INJECTION INTRAMUSCULAR; INTRAVENOUS; SUBCUTANEOUS EVERY 30 MIN PRN
Status: CANCELLED | OUTPATIENT
Start: 2018-10-18

## 2018-09-20 RX ORDER — PROCHLORPERAZINE MALEATE 10 MG
10 TABLET ORAL EVERY 6 HOURS PRN
Qty: 30 TABLET | Refills: 1 | Status: SHIPPED | OUTPATIENT
Start: 2018-09-20

## 2018-09-20 RX ORDER — MITOMYCIN 5 MG/10ML
10 INJECTION, POWDER, LYOPHILIZED, FOR SOLUTION INTRAVENOUS ONCE
Status: COMPLETED | OUTPATIENT
Start: 2018-09-20 | End: 2018-09-20

## 2018-09-20 RX ORDER — LORAZEPAM 2 MG/ML
0.5 INJECTION INTRAMUSCULAR EVERY 4 HOURS PRN
Status: CANCELLED
Start: 2018-10-18

## 2018-09-20 RX ORDER — EPINEPHRINE 1 MG/ML
0.3 INJECTION, SOLUTION, CONCENTRATE INTRAVENOUS EVERY 5 MIN PRN
Status: CANCELLED | OUTPATIENT
Start: 2018-09-20

## 2018-09-20 RX ORDER — ALBUTEROL SULFATE 90 UG/1
1-2 AEROSOL, METERED RESPIRATORY (INHALATION)
Status: CANCELLED
Start: 2018-10-18

## 2018-09-20 RX ORDER — EPINEPHRINE 1 MG/ML
0.3 INJECTION, SOLUTION, CONCENTRATE INTRAVENOUS EVERY 5 MIN PRN
Status: CANCELLED | OUTPATIENT
Start: 2018-10-18

## 2018-09-20 RX ORDER — EPINEPHRINE 0.3 MG/.3ML
0.3 INJECTION SUBCUTANEOUS EVERY 5 MIN PRN
Status: CANCELLED | OUTPATIENT
Start: 2018-09-20

## 2018-09-20 RX ORDER — DIPHENHYDRAMINE HYDROCHLORIDE 50 MG/ML
50 INJECTION INTRAMUSCULAR; INTRAVENOUS
Status: CANCELLED
Start: 2018-09-20

## 2018-09-20 RX ORDER — METHYLPREDNISOLONE SODIUM SUCCINATE 125 MG/2ML
125 INJECTION, POWDER, LYOPHILIZED, FOR SOLUTION INTRAMUSCULAR; INTRAVENOUS
Status: CANCELLED
Start: 2018-09-20

## 2018-09-20 RX ORDER — SODIUM CHLORIDE 9 MG/ML
1000 INJECTION, SOLUTION INTRAVENOUS CONTINUOUS PRN
Status: CANCELLED
Start: 2018-09-20

## 2018-09-20 RX ORDER — ALBUTEROL SULFATE 0.83 MG/ML
2.5 SOLUTION RESPIRATORY (INHALATION)
Status: CANCELLED | OUTPATIENT
Start: 2018-09-20

## 2018-09-20 RX ORDER — MITOMYCIN 5 MG/10ML
10 INJECTION, POWDER, LYOPHILIZED, FOR SOLUTION INTRAVENOUS ONCE
Status: CANCELLED
Start: 2018-10-18

## 2018-09-20 RX ORDER — ALBUTEROL SULFATE 0.83 MG/ML
2.5 SOLUTION RESPIRATORY (INHALATION)
Status: CANCELLED | OUTPATIENT
Start: 2018-10-18

## 2018-09-20 RX ORDER — EPINEPHRINE 0.3 MG/.3ML
0.3 INJECTION SUBCUTANEOUS EVERY 5 MIN PRN
Status: CANCELLED | OUTPATIENT
Start: 2018-10-18

## 2018-09-20 RX ORDER — DIPHENHYDRAMINE HYDROCHLORIDE 50 MG/ML
50 INJECTION INTRAMUSCULAR; INTRAVENOUS
Status: CANCELLED
Start: 2018-10-18

## 2018-09-20 RX ADMIN — DEXAMETHASONE SODIUM PHOSPHATE 12 MG: 10 INJECTION, SOLUTION INTRAMUSCULAR; INTRAVENOUS at 10:28

## 2018-09-20 RX ADMIN — FAMOTIDINE 20 MG: 20 INJECTION, SOLUTION INTRAVENOUS at 10:07

## 2018-09-20 RX ADMIN — MITOMYCIN 19 MG: 20 INJECTION, POWDER, LYOPHILIZED, FOR SOLUTION INTRAVENOUS at 10:54

## 2018-09-20 RX ADMIN — SODIUM CHLORIDE 250 ML: 9 INJECTION, SOLUTION INTRAVENOUS at 10:07

## 2018-09-20 ASSESSMENT — PAIN SCALES - GENERAL: PAINLEVEL: NO PAIN (0)

## 2018-09-20 NOTE — MR AVS SNAPSHOT
After Visit Summary   9/20/2018    Jenise Calix    MRN: [id number]           Patient Information     Date Of Birth          1950        Visit Information        Provider Department      9/20/2018 10:00 AM ROOM 8 [facility]        Today's Diagnoses     Malignant neoplasm of anal canal (H)    -  1       Follow-ups after your visit        Your next 10 appointments already scheduled     Sep 20, 2018  1:30 PM CDT   Linear Accelerator with Lr p Rad Tech   [facility] ([facility])    [address], [address]  [place] [address]   [phone]            Sep 21, 2018 11:15 AM CDT   Linear Accelerator with Lr p Rad Tech   [facility] ([facility])    [address]  [place] [address]   [phone]            Sep 24, 2018 11:15 AM CDT   Linear Accelerator with Lr p Rad Tech   [facility] ([facility])    [address]  [place] [address]   [phone]            Sep 24, 2018 12:00 PM CDT   Level O with ROOM 4 [facility] ([facility])    n Medical [facility]  [address]  [place] [address]   [phone]            Sep 25, 2018 11:15 AM CDT   Linear Accelerator with Lr p Rad Tech   [facility] ([facility])    [address]  [place] [address]   [phone]            Sep 26, 2018 11:15 AM CDT   Linear Accelerator with Lr p Rad Tech   [facility] ([facility])    [address]  [place] [address]   [phone]            Sep 26, 2018 11:30 AM CDT   ON TREATMENT VISIT with Edmundo Iqbal MD   [facility] ([facility])    [address]  [place] [address]   [phone]            Sep 27, 2018 11:15 AM CDT   Linear Accelerator with Lr [facility] Rad Tech   [facility]  "([facility])    [address]  [facility] [address]   [phone]            Sep 28, 2018 11:15 AM CDT   Linear Accelerator with Lr [facility]   Radiation Therapy [place] ([facility])    [address]  [facility] [address]   [phone]            Oct 01, 2018 11:15 AM CDT   Linear Accelerator with Lr [facility] Rad [facility]   Radiation Therapy [place] ([facility])    [address]  [facility] [address]   [phone]              Who to contact     If you have questions or need follow up information about today's clinic visit or your schedule please contact [facility] directly at [phone].  Normal or non-critical lab and imaging results will be communicated to you by ServiceTradehart, letter or phone within 4 business days after the clinic has received the results. If you do not hear from us within 7 days, please contact the clinic through ServiceTradehart or phone. If you have a critical or abnormal lab result, we will notify you by phone as soon as possible.  Submit refill requests through [email or web address] or call your pharmacy and they will forward the refill request to us. Please allow 3 business days for your refill to be completed.          Additional Information About Your Visit        ServiceTradeharRatherGather Information     [email or web address] lets you send messages to your doctor, view your test results, renew your prescriptions, schedule appointments and more. To sign up, go to [email or web address] . Click on \"Log in\" on the left side of the screen, which will take you to the Welcome page. Then click on \"Sign up Now\" on the right side of the page.     You will be asked to enter the access code listed below, as well as some personal information. Please follow the directions to create your username and password.     Your access code is: [email or web address]  Expires: 2018 11:02 AM     Your access code will  in 90 days. If you need help or a new code, please call " your [facility] clinic or [phone].        Care EveryWhere ID     This is your Care EveryWhere ID. This could be used by other organizations to access your [facility] medical records  [id number]        Your Vitals Were     Pulse Temperature Respirations             65 97.2  F (36.2  C) (Tympanic) 16          Blood Pressure from Last 3 Encounters:   09/20/18 179/72   09/18/18 143/57   09/14/18 156/79    Weight from Last 3 Encounters:   09/18/18 76.7 kg (169 lb)   09/14/18 76.7 kg (169 lb)   08/30/18 76.9 kg (169 lb 9.6 oz)              We Performed the Following     CBC with platelets differential     Comprehensive metabolic panel          Today's Medication Changes          These changes are accurate as of 9/20/18 12:01 PM.  If you have any questions, ask your nurse or doctor.               Start taking these medicines.        Dose/Directions    prochlorperazine 10 MG tablet   Commonly known as:  COMPAZINE   Used for:  Malignant neoplasm of anal canal (H)        Dose:  10 mg   Take 1 tablet (10 mg) by mouth every 6 hours as needed (Nausea/Vomiting)   Quantity:  30 tablet   Refills:  1            Where to get your medicines      These medications were sent to [id number] [place] [address] [facility]  [address] [place] [address]     Phone:  [phone]     prochlorperazine 10 MG tablet                Primary Care Provider Fax #    Physician No Ref-Primary [phone]       No address on file        Equal Access to Services     AGUSTIN HAMILTON AH: Hadii basilio wood Soinder, waaxda luqadaha, qaybta kaalmada adeegyada, katina chahal ademaryana alcaraz. So [facility] [phone].    ATENCIÓN: Si habla español, tiene a sanchez disposición servicios gratuitos de asistencia lingüística. Llame al [phone].    We comply with applicable federal civil rights laws and [place] laws. We do not discriminate on the basis of race, color, national origin, age, disability, sex, sexual orientation, or  gender identity.            Thank you!     Thank you for choosing [facility]  for your care. Our goal is always to provide you with excellent care. Hearing back from our patients is one way we can continue to improve our services. Please take a few minutes to complete the written survey that you may receive in the mail after your visit with us. Thank you!             Your Updated Medication List - Protect others around you: Learn how to safely use, store and throw away your medicines at www.disposemymeds.org.          This list is accurate as of 9/20/18 12:01 PM.  Always use your most recent med list.                   Brand Name Dispense Instructions for use Diagnosis    aspirin 81 MG tablet      Take 81 mg by mouth        HYDROcodone-acetaminophen 5-325 MG per tablet    NORCO    20 tablet    Take 1-2 tablets by mouth every 6 hours as needed for pain    Post-op pain       prochlorperazine 10 MG tablet    COMPAZINE    30 tablet    Take 1 tablet (10 mg) by mouth every 6 hours as needed (Nausea/Vomiting)    Malignant neoplasm of anal canal (H)

## 2018-09-20 NOTE — PROGRESS NOTES
Infusion Nursing Note:  Jenise Calix presents today for C1D1 Mitomycin/5FU.    Patient seen by provider today: No   present during visit today: Not Applicable.    Note: Compazine sent to [place] . Reviewed chemo side effects, pt. With many appropriate questions. Treatment schedule reviewed with pt.    Intravenous Access:  Implanted Port.    Treatment Conditions:  Lab Results   Component Value Date    HGB 15.6 09/20/2018     Lab Results   Component Value Date    WBC 11.7 09/20/2018      Lab Results   Component Value Date    ANEU 8.3 09/20/2018     Lab Results   Component Value Date     09/20/2018      Lab Results   Component Value Date     09/20/2018                   Lab Results   Component Value Date    POTASSIUM 3.9 09/20/2018           No results found for: MAG         Lab Results   Component Value Date    CR 0.67 09/20/2018                   Lab Results   Component Value Date    BRUNO 9.8 09/20/2018                Lab Results   Component Value Date    BILITOTAL 0.6 09/20/2018           Lab Results   Component Value Date    ALBUMIN 3.7 09/20/2018                    Lab Results   Component Value Date    ALT 12 09/20/2018           Lab Results   Component Value Date    AST 11 09/20/2018       Results reviewed, labs MET treatment parameters, ok to proceed with treatment.      Post Infusion Assessment:  Patient tolerated infusion without incident.  Blood return noted pre and post infusion.  Site patent and intact, free from redness, edema or discomfort.  No evidence of extravasations.  CADD pump (5FU) infusing at discharge x 4 days. Pt. Will return Monday 24th for pump discharge.    Discharge Plan:   Patient discharged in stable condition accompanied by: self.  Departure Mode: Ambulatory.    Grace Vaughn RN

## 2018-09-21 ENCOUNTER — OFFICE VISIT (OUTPATIENT)
Dept: RADIATION THERAPY | Facility: OUTPATIENT CENTER | Age: 68
End: 2018-09-21
Payer: COMMERCIAL

## 2018-09-21 DIAGNOSIS — K12.31 MUCOSITIS DUE TO CHEMOTHERAPY: Primary | ICD-10-CM

## 2018-09-21 NOTE — MR AVS SNAPSHOT
After Visit Summary   9/21/2018    Jenise Calix    MRN: [id number]           Patient Information     Date Of Birth          1950        Visit Information        Provider Department      9/21/2018 11:15 AM Tech, Lr [facility]        Today's Diagnoses     Mucositis due to chemotherapy    -  1       Follow-ups after your visit        Your next 10 appointments already scheduled     Oct 25, 2018 10:30 AM CDT   Linear Accelerator with Lr Ump Rad Tech   [facility] ([facility])    [address], [address]  [facility] [address]   [phone]            Oct 25, 2018 11:00 AM CDT   Return Visit with Ced Gunn MD   [facility] ([facility])    [facility]  [address]  [place] [address]   [phone]            Oct 25, 2018  1:30 PM CDT   Level 1 with ROOM 1 [facility] ([facility])    [facility]  [address]  [place] [address]   [phone]            Oct 26, 2018 10:30 AM CDT   Linear Accelerator with Lr p Rad Tech   [facility] ([facility])    [address]  [facility] [address]   [phone]            Oct 26, 2018 10:45 AM CDT   ON TREATMENT VISIT with Edmundo Iqbal MD   [facility] ([facility])    [address], [address]  [facility] [address]   [phone]            Oct 29, 2018 10:30 AM CDT   Linear Accelerator with Lr Ump Rad Tech   [facility] ([facility])    [address]  [place] [address]   [phone]            Oct 30, 2018 10:30 AM CDT   Linear Accelerator with Lr Ump Rad Tech   [facility] ([facility])    [address]  [place] [address]   [phone]            Oct 31, 2018 10:30 AM CDT   Linear Accelerator with Lr Ump Rad  [facility]   [facility] ([facility])    [address]  [facility] [address]   [phone]            Oct 31, 2018 10:45 AM CDT   ON TREATMENT VISIT with Edmundo Iqbal MD   [facility] ([facility])    [address]  [facility] [address]   [phone]            Nov 01, 2018 10:30 AM CDT   Linear Accelerator with Lr [facility]   [facility] ([facility])    [address]  [facility] [address]   [phone]              Future tests that were ordered for you today     Open Future Orders        Priority Expected Expires Ordered    Clostridium difficile toxin B PCR Routine 10/25/2018 12/23/2018 10/24/2018            Who to contact     Please call your clinic at [phone] to:    Ask questions about your health    Make or cancel appointments    Discuss your medicines    Learn about your test results    Speak to your doctor            Additional Information About Your Visit        Care EveryWhere ID     This is your Care EveryWhere ID. This could be used by other organizations to access your [facility] medical records  [id number]         Blood Pressure from Last 3 Encounters:   10/22/18 125/50   10/18/18 129/61   10/17/18 115/78    Weight from Last 3 Encounters:   10/17/18 73.1 kg (161 lb 3.2 oz)   10/10/18 73.5 kg (162 lb)   10/10/18 73.5 kg (162 lb)              Today, you had the following     No orders found for display         Today's Medication Changes          These changes are accurate as of 9/21/18 11:59 PM.  If you have any questions, ask your nurse or doctor.               Start taking these medicines.        Dose/Directions    lidocaine (viscous) 2 % solution   Commonly known as:  XYLOCAINE   Used for:  Mucositis due to chemotherapy        Dose:  15 mL   Take 15 mLs by mouth every 2 hours as needed for moderate pain swish and spit every 3-8 hours as needed; max 8 doses/24 hour period    Quantity:  100 mL   Refills:  3            Where to get your medicines      These medications were sent to [id number] - [place], [place] - [address]  [address] [facility] [place] [address]     Phone:  [phone]     lidocaine (viscous) 2 % solution                Primary Care Provider Fax #    Physician No Ref-Primary [phone]       No address on file        Equal Access to Services     [facility] Center[facility] : Hadii aad ku hadasho Soomaali, waaxda luqadaha, qaybta kaalmada adeegyada, waxay idiin hayaan adeeg khrobynbrianne laanaya . So [facility] [phone].    ATENCIÓN: Si habla español, tiene a sanchez disposición servicios gratuitos de asistencia lingüística. [facility] [phone].    We comply with applicable federal civil rights laws and [place] laws. We do not discriminate on the basis of race, color, national origin, age, disability, sex, sexual orientation, or gender identity.            Thank you!     Thank you for choosing [facility]  for your care. Our goal is always to provide you with excellent care. Hearing back from our patients is one way we can continue to improve our services. Please take a few minutes to complete the written survey that you may receive in the mail after your visit with us. Thank you!             Your Updated Medication List - Protect others around you: Learn how to safely use, store and throw away your medicines at www.disposemymeds.org.          This list is accurate as of 9/21/18 11:59 PM.  Always use your most recent med list.                   Brand Name Dispense Instructions for use Diagnosis    aspirin 81 MG tablet      Take 81 mg by mouth        HYDROcodone-acetaminophen 5-325 MG per tablet    NORCO    20 tablet    Take 1-2 tablets by mouth every 6 hours as needed for pain    Post-op pain       lidocaine (viscous) 2 % solution    XYLOCAINE    100 mL    Take 15 mLs by mouth every 2 hours as needed for moderate pain swish and spit every 3-8 hours as needed; max 8  doses/24 hour period    Mucositis due to chemotherapy       prochlorperazine 10 MG tablet    COMPAZINE    30 tablet    Take 1 tablet (10 mg) by mouth every 6 hours as needed (Nausea/Vomiting)    Malignant neoplasm of anal canal (H)

## 2018-09-21 NOTE — PROGRESS NOTES
This is a recent snapshot of the patient's [facility] medical record.  For current drug dose and complete information and questions, call [phone] or In Basket pool, [facility] home infusion ([id number])  CSN Number:  [id number]

## 2018-09-24 ENCOUNTER — APPOINTMENT (OUTPATIENT)
Dept: RADIATION THERAPY | Facility: OUTPATIENT CENTER | Age: 68
End: 2018-09-24
Payer: COMMERCIAL

## 2018-09-24 ENCOUNTER — INFUSION THERAPY VISIT (OUTPATIENT)
Dept: INFUSION THERAPY | Facility: CLINIC | Age: 68
End: 2018-09-24
Attending: INTERNAL MEDICINE
Payer: MEDICARE

## 2018-09-24 DIAGNOSIS — C21.1 MALIGNANT NEOPLASM OF ANAL CANAL (H): Primary | ICD-10-CM

## 2018-09-24 PROCEDURE — 25000128 H RX IP 250 OP 636: Performed by: INTERNAL MEDICINE

## 2018-09-24 RX ORDER — HEPARIN SODIUM (PORCINE) LOCK FLUSH IV SOLN 100 UNIT/ML 100 UNIT/ML
5 SOLUTION INTRAVENOUS
Status: DISCONTINUED | OUTPATIENT
Start: 2018-09-24 | End: 2018-09-24 | Stop reason: HOSPADM

## 2018-09-24 RX ADMIN — SODIUM CHLORIDE, PRESERVATIVE FREE 5 ML: 5 INJECTION INTRAVENOUS at 11:55

## 2018-09-24 NOTE — MR AVS SNAPSHOT
After Visit Summary   9/24/2018    Jenise Cailx    MRN: [id number]           Patient Information     Date Of Birth          1950        Visit Information        Provider Department      9/24/2018 12:00 PM ROOM 4 [facility]        Today's Diagnoses     Malignant neoplasm of anal canal (H)    -  1       Follow-ups after your visit        Your next 10 appointments already scheduled     Sep 25, 2018 11:15 AM CDT   Linear Accelerator with Lr [facility] Rad Tech   [facility] ([facility])    [address], [address]  [facility] [address]   [phone]            Sep 26, 2018 11:15 AM CDT   Linear Accelerator with Lr [facility] Rad Tech   [facility] ([facility])    [address], [address]  [facility] [address]   [phone]            Sep 26, 2018 11:30 AM CDT   ON TREATMENT VISIT with Edmundo Iqbal MD   [facility] ([facility])    [address], [address]  [facility] [address]   [phone]            Sep 27, 2018 11:15 AM CDT   Linear Accelerator with Lr [facility] Rad Tech   [facility] ([facility])    [address], [address]  [facility] [address]   [phone]            Sep 28, 2018 11:15 AM CDT   Linear Accelerator with Lr [facility] Rad Tech   [facility] ([facility])    [address], [address]  [facility] [address]   [phone]            Oct 01, 2018 11:15 AM CDT   Linear Accelerator with Lr [facility] Rad Tech   [facility] ([facility])    [address], [address]  [facility] [address]   [phone]            Oct 02, 2018 11:15 AM CDT   Linear Accelerator with Lr [facility] Rad Tech   [facility] ([facility])    [address], [address]  [facility] [address]   [phone]            Oct 03, 2018 11:15 AM CDT   Linear Accelerator with Lr [facility] Rad Tech   Radiation Therapy Center ([facility]  "[facility])    [address], [address]  [facility] [address]   [phone]            Oct 03, 2018 11:30 AM CDT   ON TREATMENT VISIT with Edmundo Iqbal MD   Radiation Therapy Center ([facility])    [address], [address]  [facility] [address]   [phone]            Oct 04, 2018 11:15 AM CDT   Linear Accelerator with Lr [facility] Tech   Radiation Therapy Center ([facility])    [address], [address]  [facility] [address]   [phone]              Who to contact     If you have questions or need follow up information about today's clinic visit or your schedule please contact [facility] directly at [phone].  Normal or non-critical lab and imaging results will be communicated to you by Snabbotekethart, letter or phone within 4 business days after the clinic has received the results. If you do not hear from us within 7 days, please contact the clinic through Snabbotekethart or phone. If you have a critical or abnormal lab result, we will notify you by phone as soon as possible.  Submit refill requests through Mayi Zhaopin or call your pharmacy and they will forward the refill request to us. Please allow 3 business days for your refill to be completed.          Additional Information About Your Visit        SnabboteketharHexAirbot Information     May[employer] Zhaopin lets you send messages to your doctor, view your test results, renew your prescriptions, schedule appointments and more. To sign up, go to [email or web address] . Click on \"Log in\" on the left side of the screen, which will take you to the Welcome page. Then click on \"Sign up Now\" on the right side of the page.     You will be asked to enter the access code listed below, as well as some personal information. Please follow the directions to create your username and password.     Your access code is: [email or web address]  Expires: 2018 11:02 AM     Your access code will  in 90 days. If you need help or a new code, please call " your [facility] clinic or [phone].        Care EveryWhere ID     This is your Care EveryWhere ID. This could be used by other organizations to access your [facility] medical records  [id number]         Blood Pressure from Last 3 Encounters:   09/20/18 179/72   09/18/18 143/57   09/14/18 156/79    Weight from Last 3 Encounters:   09/18/18 76.7 kg (169 lb)   09/14/18 76.7 kg (169 lb)   08/30/18 76.9 kg (169 lb 9.6 oz)              Today, you had the following     No orders found for display       Primary Care Provider Fax #    Physician No Ref-Primary [phone]       No address on file        Equal Access to Services     AGUSTIN HAMILTON : Radha Brown, denise mon, yolanda moore, katina whitaker . So [facility] [phone].    ATENCIÓN: Si habla español, tiene a sanchez disposición servicios gratuitos de asistencia lingüística. Llame al [phone].    We comply with applicable federal civil rights laws and [place] laws. We do not discriminate on the basis of race, color, national origin, age, disability, sex, sexual orientation, or gender identity.            Thank you!     Thank you for choosing [facility]  for your care. Our goal is always to provide you with excellent care. Hearing back from our patients is one way we can continue to improve our services. Please take a few minutes to complete the written survey that you may receive in the mail after your visit with us. Thank you!             Your Updated Medication List - Protect others around you: Learn how to safely use, store and throw away your medicines at www.disposemymeds.org.          This list is accurate as of 9/24/18  2:40 PM.  Always use your most recent med list.                   Brand Name Dispense Instructions for use Diagnosis    aspirin 81 MG tablet      Take 81 mg by mouth        HYDROcodone-acetaminophen 5-325 MG per tablet    NORCO    20 tablet    Take 1-2 tablets by mouth every  6 hours as needed for pain    Post-op pain       prochlorperazine 10 MG tablet    COMPAZINE    30 tablet    Take 1 tablet (10 mg) by mouth every 6 hours as needed (Nausea/Vomiting)    Malignant neoplasm of anal canal (H)

## 2018-09-24 NOTE — PROGRESS NOTES
Infusion Nursing Note:  Jenise Calix presents today for pump d/c.    Patient seen by provider today: No   present during visit today: Not Applicable.    Note: Chemo fully infused.    Intravenous Access:  Implanted Port.    Treatment Conditions:  Not Applicable.      Post Infusion Assessment:  Patient tolerated infusion without incident.  Blood return noted pre and post infusion.  Site patent and intact, free from redness, edema or discomfort.  No evidence of extravasations.  Access discontinued per protocol.    Discharge Plan:   Patient discharged in stable condition accompanied by: self.  Departure Mode: Ambulatory.    Grace Vaughn RN

## 2018-09-25 ENCOUNTER — APPOINTMENT (OUTPATIENT)
Dept: RADIATION THERAPY | Facility: OUTPATIENT CENTER | Age: 68
End: 2018-09-25
Payer: COMMERCIAL

## 2018-09-26 ENCOUNTER — OFFICE VISIT (OUTPATIENT)
Dept: RADIATION THERAPY | Facility: OUTPATIENT CENTER | Age: 68
End: 2018-09-26
Payer: COMMERCIAL

## 2018-09-26 ENCOUNTER — APPOINTMENT (OUTPATIENT)
Dept: RADIATION THERAPY | Facility: OUTPATIENT CENTER | Age: 68
End: 2018-09-26
Payer: COMMERCIAL

## 2018-09-26 VITALS
BODY MASS INDEX: 25.84 KG/M2 | DIASTOLIC BLOOD PRESSURE: 68 MMHG | WEIGHT: 165 LBS | HEART RATE: 87 BPM | SYSTOLIC BLOOD PRESSURE: 123 MMHG

## 2018-09-26 DIAGNOSIS — C21.1 MALIGNANT NEOPLASM OF ANAL CANAL (H): Primary | ICD-10-CM

## 2018-09-26 NOTE — PROGRESS NOTES
[facility] PHYSICIANS  SPECIALIZING IN BREAKTHROUGHS  Radiation Oncology    On Treatment Visit Note      Jenise Calix      Date: 2018   MRN: [id number]   : 1950         Reason for Visit:  On Radiation Treatment Visit     Treatment Summary to Date   Anal canal/ Pelvic + Inguinal nodes  900 cGy/ 5400 cGy            Subjective:   Overall doing well. Mild fatigue. Has developed mild mucositis from 5FU. Tolerating PO. No nausea. Poor bladder filling today. Will reinforce. No dimitris-anal skin pain or discomfort. No loose stool.     Nursing ROS:           Objective:   /68  Pulse 87  Wt 74.8 kg (165 lb)  BMI 25.84 kg/m2  No apparent distress.   Skin - no erythema or desquamation in treatment field.    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  All questions and concerns addressed.    Plan:   1. Continue current therapy.    2. Reviewed expected side effects of treatment.   3. Continue aquaphor application to skin.   4. Imodium PRN loose stool.   5. Reinforced bladder filling for treatment. Will drink 24 to 32 oz 30 minutes prior to RT.      Mosaiq chart and setup information reviewed  Ports checked      Edmundo Iqbal MD

## 2018-09-26 NOTE — LETTER
2018      RE: Jenise Calix  [address]  [facility] [address]       [facility] PHYSICIANS  SPECIALIZING IN BREAKTHROUGHS  Radiation Oncology    On Treatment Visit Note      Jenise Calix      Date: 2018   MRN: [id number]   : 1950         Reason for Visit:  On Radiation Treatment Visit     Treatment Summary to Date   Anal canal/ Pelvic + Inguinal nodes  900 cGy/ 5400 cGy            Subjective:   Overall doing well. Mild fatigue. Has developed mild mucositis from 5FU. Tolerating PO. No nausea. Poor bladder filling today. Will reinforce. No dimitris-anal skin pain or discomfort. No loose stool.     Nursing ROS:           Objective:   /68  Pulse 87  Wt 74.8 kg (165 lb)  BMI 25.84 kg/m2  No apparent distress.   Skin - no erythema or desquamation in treatment field.    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  All questions and concerns addressed.    Plan:   1. Continue current therapy.    2. Reviewed expected side effects of treatment.   3. Continue aquaphor application to skin.   4. Imodium PRN loose stool.   5. Reinforced bladder filling for treatment. Will drink 24 to 32 oz 30 minutes prior to RT.      Mosaiq chart and setup information reviewed  Ports checked    Edmundo Iqbal MD

## 2018-09-26 NOTE — MR AVS SNAPSHOT
After Visit Summary   9/26/2018    Jenise Calix    MRN: [id number]           Patient Information     Date Of Birth          1950        Visit Information        Provider Department      9/26/2018 11:30 AM Edmundo Iqbal MD [facility]        Today's Diagnoses     Malignant neoplasm of anal canal (H)    -  1       Follow-ups after your visit        Your next 10 appointments already scheduled     Sep 27, 2018 11:15 AM CDT   Linear Accelerator with Lr [facility] Rad Tech   Radiation Therapy Center ([facility])    [address], [address]  [facility] [address]   [phone]            Sep 28, 2018 11:15 AM CDT   Linear Accelerator with Lr p Rad Tech   Radiation Therapy Center ([facility])    [address], [address]  [facility] [address]   [phone]            Oct 01, 2018 11:15 AM CDT   Linear Accelerator with Lr [facility] Rad Tech   Rad[facility] Therapy Center ([facility])    [address]  [facility] [address]   [phone]            Oct 02, 2018 11:15 AM CDT   Linear Accelerator with Lr p Rad Tech   Rad[facility] Therapy Center ([facility])    [address]  [place] [address]   [phone]            Oct 03, 2018 11:15 AM CDT   Linear Accelerator with Lr p Rad Tech   Rad[facility] Therapy Center ([facility])    [address]  [facility] [address]   [phone]            Oct 03, 2018 11:30 AM CDT   ON TREATMENT VISIT with Edmundo Iqbal MD   [facility] ([facility])    [address], [address]  [place] [address]   [phone]            Oct 04, 2018 11:15 AM CDT   Linear Accelerator with Lr p Rad Tech   Radiation Therapy Center ([facility])    [address], [address]  [facility] [address]   [phone]            Oct 05, 2018 11:15 AM CDT   Linear Accelerator with Lr p Rad Tech   Radiation Therapy  Center ([facility])    [address], [address]  [facility] [address]   [phone]            Oct 08, 2018 11:15 AM CDT   Linear Accelerator with Lr [facility] Rad [facility]   Radiation Therapy Center ([facility])    [address]  [facility] [address]   [phone]            Oct 09, 2018 11:15 AM CDT   Linear Accelerator with Lr [facility] Rad [facility]   Radiation Therapy [place] ([facility])    [address]  [facility] [address]   [phone]              Who to contact     Please call your clinic at [phone] to:    Ask questions about your health    Make or cancel appointments    Discuss your medicines    Learn about your test results    Speak to your doctor            Additional Information About Your Visit        Care EveryWhere ID     This is your Care EveryWhere ID. This could be used by other organizations to access your [facility] medical records  [id number]        Your Vitals Were     Pulse BMI (Body Mass Index)                87 25.84 kg/m2           Blood Pressure from Last 3 Encounters:   09/26/18 123/68   09/20/18 179/72   09/18/18 143/57    Weight from Last 3 Encounters:   09/26/18 74.8 kg (165 lb)   09/18/18 76.7 kg (169 lb)   09/14/18 76.7 kg (169 lb)              Today, you had the following     No orders found for display       Primary Care Provider Fax #    Physician No Ref-Primary [phone]       No address on file        Equal Access to Services     AGUSTIN HAMILTON : Hadii basilio mcdanielso Sojessali, waaxda luqadaha, qaybta kaalmada adeegyada, katina chahal ademaryana whitaker . So [facility] [phone].    ATENCIÓN: Si habla español, tiene a sanchez disposición servicios gratuitos de asistencia lingüística. Llame al [phone].    We comply with applicable federal civil rights laws and [place] laws. We do not discriminate on the basis of race, color, national origin, age, disability, sex, sexual orientation, or gender identity.             Thank you!     Thank you for choosing [facility]  for your care. Our goal is always to provide you with excellent care. Hearing back from our patients is one way we can continue to improve our services. Please take a few minutes to complete the written survey that you may receive in the mail after your visit with us. Thank you!             Your Updated Medication List - Protect others around you: Learn how to safely use, store and throw away your medicines at www.disposemymeds.org.          This list is accurate as of 9/26/18  1:00 PM.  Always use your most recent med list.                   Brand Name Dispense Instructions for use Diagnosis    aspirin 81 MG tablet      Take 81 mg by mouth        HYDROcodone-acetaminophen 5-325 MG per tablet    NORCO    20 tablet    Take 1-2 tablets by mouth every 6 hours as needed for pain    Post-op pain       prochlorperazine 10 MG tablet    COMPAZINE    30 tablet    Take 1 tablet (10 mg) by mouth every 6 hours as needed (Nausea/Vomiting)    Malignant neoplasm of anal canal (H)

## 2018-09-27 ENCOUNTER — APPOINTMENT (OUTPATIENT)
Dept: RADIATION THERAPY | Facility: OUTPATIENT CENTER | Age: 68
End: 2018-09-27
Payer: COMMERCIAL

## 2018-09-28 ENCOUNTER — APPOINTMENT (OUTPATIENT)
Dept: RADIATION THERAPY | Facility: OUTPATIENT CENTER | Age: 68
End: 2018-09-28
Payer: COMMERCIAL

## 2018-10-01 ENCOUNTER — APPOINTMENT (OUTPATIENT)
Dept: RADIATION THERAPY | Facility: OUTPATIENT CENTER | Age: 68
End: 2018-10-01
Payer: COMMERCIAL

## 2018-10-02 ENCOUNTER — APPOINTMENT (OUTPATIENT)
Dept: RADIATION THERAPY | Facility: OUTPATIENT CENTER | Age: 68
End: 2018-10-02
Payer: COMMERCIAL

## 2018-10-03 ENCOUNTER — OFFICE VISIT (OUTPATIENT)
Dept: RADIATION THERAPY | Facility: OUTPATIENT CENTER | Age: 68
End: 2018-10-03
Payer: COMMERCIAL

## 2018-10-03 ENCOUNTER — APPOINTMENT (OUTPATIENT)
Dept: RADIATION THERAPY | Facility: OUTPATIENT CENTER | Age: 68
End: 2018-10-03
Payer: COMMERCIAL

## 2018-10-03 VITALS
SYSTOLIC BLOOD PRESSURE: 114 MMHG | BODY MASS INDEX: 24.9 KG/M2 | OXYGEN SATURATION: 97 % | RESPIRATION RATE: 18 BRPM | HEART RATE: 97 BPM | DIASTOLIC BLOOD PRESSURE: 79 MMHG | WEIGHT: 159 LBS

## 2018-10-03 DIAGNOSIS — C21.1 MALIGNANT NEOPLASM OF ANAL CANAL (H): Primary | ICD-10-CM

## 2018-10-03 RX ORDER — SILVER SULFADIAZINE 10 MG/G
CREAM TOPICAL 2 TIMES DAILY
Qty: 25 G | Refills: 1 | Status: SHIPPED | OUTPATIENT
Start: 2018-10-03 | End: 2018-12-20

## 2018-10-03 RX ORDER — OXYCODONE HYDROCHLORIDE 5 MG/1
5 CAPSULE ORAL EVERY 4 HOURS PRN
Qty: 60 CAPSULE | Refills: 0 | Status: ON HOLD | OUTPATIENT
Start: 2018-10-03 | End: 2018-11-02

## 2018-10-03 NOTE — LETTER
10/3/2018      RE: Jenise Calix  [address]  [place] [address]       [facility] PHYSICIANS  SPECIALIZING IN BREAKTHROUGHS  Radiation Oncology    On Treatment Visit Note      Jenise Calix      Date: 10/3/2018   MRN: [id number]   : 1950         Reason for Visit:  On Radiation Treatment Visit     Treatment Summary to Date   Anal canal/ Pelvic + Inguinal nodes  1800 cGy/ 5400 cGy  10/30          Subjective:   More fatigued. Has developed mild dimitris-anal skin pain discomfort. No  or GI bother. Has had continued mucositis from 5FU, has viscous lidocaine.    Nursing ROS:           Objective:   There were no vitals taken for this visit.  No apparent distress.   Skin - mild erythema and desquamation in dimitris-anal area and vaginal introitus.   Anal canal - protruding mass slightly decreased since starting RT.     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  All questions and concerns addressed.    Plan:   1. Continue current therapy.    2. Continue aquaphor application to skin. Hydrocortisone PRN pruritus. Prescribed silvadene and topical lidocaine for dimitris-anal area and vaginal area with skin breakdown. Prescribed oxycodone 5mg PRN should pain develop.  3. Imodium PRN loose stool.       Mosaiq chart and setup  information reviewed  Ports checked      Edmundo Iqbal MD

## 2018-10-03 NOTE — MR AVS SNAPSHOT
After Visit Summary   10/3/2018    Jenise Calix    MRN: [id number]           Patient Information     Date Of Birth          1950        Visit Information        Provider Department      10/3/2018 1:30 PM Edmundo Iqbal MD [facility]        Today's Diagnoses     Malignant neoplasm of anal canal (H)    -  1      Care Instructions    Skin Care   Use Proshield or Aquaphor -  All over your backside and dimitris area and creases - use this as many times per day as you need and after you go to the bathroom - apply all over    Silvadene (prescription cream) -  2 times daily  - apply a thin layer to small area around anus/tumor where it is raw - aply a thin layer to vaginal area as skin is open there as well    Lidocaine - apply a thin layer topically to painful areas - can use every 4 hours as needed for topical pain          Follow-ups after your visit        Your next 10 appointments already scheduled     Oct 04, 2018 11:15 AM CDT   Linear Accelerator with Lr [facility] Rad Tech   [facility] ([facility])    [address], [address] [place] [address]   [phone]            Oct 05, 2018 11:15 AM CDT   Linear Accelerator with Lr [facility] Rad Tech   [facility] ([facility])    [address], [address]  [facility] [address]   [phone]            Oct 08, 2018 11:15 AM CDT   Linear Accelerator with Lr [facility] Rad Tech   [facility] ([facility])    [address], [address]  [facility] [address]   [phone]            Oct 09, 2018 11:15 AM CDT   Linear Accelerator with Lr [facility] Rad Tech   [facility] ([facility])    [address]  [facility] [address]   [phone]            Oct 10, 2018 11:15 AM CDT   Linear Accelerator with Lr [facility] Rad Tech   [facility] ([facility])    [address], [address]  [facility] [address]   [phone]             Oct 10, 2018 11:30 AM CDT   ON TREATMENT VISIT with Edmundo Iqbal MD   [facility] ([facility])    [address], [address]  [place] [address]   [phone]            Oct 11, 2018 11:15 AM CDT   Linear Accelerator with Lr [facility] Rad Tech   [facility] ([facility])    [address]  [place] [address]   [phone]            Oct 12, 2018 11:15 AM CDT   Linear Accelerator with Lr [facility] Rad Tech   [facility] ([facility])    [address]  [place] [address]   [phone]            Oct 15, 2018 11:15 AM CDT   Linear Accelerator with Lr [facility] Rad Tech   [facility] ([facility])    [address]  [place] [address]   [phone]            Oct 16, 2018 11:15 AM CDT   Linear Accelerator with Lr [facility] Rad Tech   [facility] ([facility])    [address], [address]  [facility] [address]   [phone]              Who to contact     Please call your clinic at [phone] to:    Ask questions about your health    Make or cancel appointments    Discuss your medicines    Learn about your test results    Speak to your doctor            Additional Information About Your Visit        Care EveryWhere ID     This is your Care EveryWhere ID. This could be used by other organizations to access your [place] medical records  [id number]        Your Vitals Were     Pulse Respirations Pulse Oximetry BMI (Body Mass Index)          97 18 97% 24.9 kg/m2         Blood Pressure from Last 3 Encounters:   10/03/18 114/79   09/26/18 123/68   09/20/18 179/72    Weight from Last 3 Encounters:   10/03/18 72.1 kg (159 lb)   09/26/18 74.8 kg (165 lb)   09/18/18 76.7 kg (169 lb)              Today, you had the following     No orders found for display         Today's Medication Changes          These changes are accurate as of 10/3/18  2:18  PM.  If you have any questions, ask your nurse or doctor.               Start taking these medicines.        Dose/Directions    oxyCODONE 5 MG capsule   Commonly known as:  OXY-IR   Used for:  Malignant neoplasm of anal canal (H)   Started by:  Edmundo Iqbal MD        Dose:  5 mg   Take 1 capsule (5 mg) by mouth every 4 hours as needed for severe pain   Quantity:  60 capsule   Refills:  0       silver sulfADIAZINE 1 % cream   Commonly known as:  SILVADENE   Used for:  Malignant neoplasm of anal canal (H)   Started by:  Edmundo Iqbal MD        Apply topically 2 times daily   Quantity:  25 g   Refills:  1         These medicines have changed or have updated prescriptions.        Dose/Directions    * lidocaine (viscous) 2 % solution   Commonly known as:  XYLOCAINE   This may have changed:  Another medication with the same name was added. Make sure you understand how and when to take each.   Used for:  Mucositis due to chemotherapy   Changed by:  Edmundo Iqbal MD        Dose:  15 mL   Take 15 mLs by mouth every 2 hours as needed for moderate pain swish and spit every 3-8 hours as needed; max 8 doses/24 hour period   Quantity:  100 mL   Refills:  3       * lidocaine (viscous) 2 % solution   Commonly known as:  XYLOCAINE   This may have changed:  You were already taking a medication with the same name, and this prescription was added. Make sure you understand how and when to take each.   Used for:  Malignant neoplasm of anal canal (H)   Changed by:  Edmundo Iqbal MD        Dose:  10 mL   Apply 10 mLs topically every 4 hours as needed for moderate pain Apply to dimitris-anal area every 4 hours as needed for pain.   Quantity:  100 mL   Refills:  3       * Notice:  This list has 2 medication(s) that are the same as other medications prescribed for you. Read the directions carefully, and ask your doctor or other care provider to review them with you.         Where to get your medicines      These medications were  sent to [facility] [place], [place] - [address]  [address] [facility] [address]     Phone:  [phone]     lidocaine (viscous) 2 % solution    silver sulfADIAZINE 1 % cream         Some of these will need a paper prescription and others can be bought over the counter.  Ask your nurse if you have questions.     Bring a paper prescription for each of these medications     oxyCODONE 5 MG capsule               Information about OPIOIDS     PRESCRIPTION OPIOIDS: WHAT YOU NEED TO KNOW   We gave you an opioid (narcotic) pain medicine. It is important to manage your pain, but opioids are not always the best choice. You should first try all the other options your care team gave you. Take this medicine for as short a time (and as few doses) as possible.    Some activities can increase your pain, such as bandage changes or therapy sessions. It may help to take your pain medicine 30 to 60 minutes before these activities. Reduce your stress by getting enough sleep, working on hobbies you enjoy and practicing relaxation or meditation. Talk to your care team about ways to manage your pain beyond prescription opioids.    These medicines have risks:    DO NOT drive when on new or higher doses of pain medicine. These medicines can affect your alertness and reaction times, and you could be arrested for driving under the influence (DUI). If you need to use opioids long-term, talk to your care team about driving.    DO NOT operate heavy machinery    DO NOT do any other dangerous activities while taking these medicines.    DO NOT drink any alcohol while taking these medicines.     If the opioid prescribed includes acetaminophen, DO NOT take with any other medicines that contain acetaminophen. Read all labels carefully. Look for the word  acetaminophen  or  Tylenol.  Ask your pharmacist if you have questions or are unsure.    You can get addicted to pain medicines, especially if you have a history of addiction  (chemical, alcohol or substance dependence). Talk to your care team about ways to reduce this risk.    All opioids tend to cause constipation. Drink plenty of water and eat foods that have a lot of fiber, such as fruits, vegetables, prune juice, apple juice and high-fiber cereal. Take a laxative (Miralax, milk of magnesia, Colace, Senna) if you don t move your bowels at least every other day. Other side effects include upset stomach, sleepiness, dizziness, throwing up, tolerance (needing more of the medicine to have the same effect), physical dependence and slowed breathing.    Store your pills in a secure place, locked if possible. We will not replace any lost or stolen medicine. If you don t finish your medicine, please throw away (dispose) as directed by your pharmacist. The [place] [employer] has more information about safe disposal: https://www.pca.[email or web address].us/living-green/managing-unwanted-medications         Primary Care Provider Fax #    Physician No Ref-Primary [phone]       No address on file        Equal Access to Services     AGUSTIN HAMILTON : Radha mcdanielso Soidner, waaxda luqadaha, qaybta kaalmada teresa, katina whitaker . So [facility] [phone].    ATENCIÓN: Si habla español, tiene a sanchez disposición servicios gratuitos de asistencia lingüística. Llame al [phone].    We comply with applicable federal civil rights laws and [place] laws. We do not discriminate on the basis of race, color, national origin, age, disability, sex, sexual orientation, or gender identity.            Thank you!     Thank you for choosing [facility]  for your care. Our goal is always to provide you with excellent care. Hearing back from our patients is one way we can continue to improve our services. Please take a few minutes to complete the written survey that you may receive in the mail after your visit with us. Thank you!             Your Updated  Medication List - Protect others around you: Learn how to safely use, store and throw away your medicines at www.disposemymeds.org.          This list is accurate as of 10/3/18  2:18 PM.  Always use your most recent med list.                   Brand Name Dispense Instructions for use Diagnosis    aspirin 81 MG tablet      Take 81 mg by mouth        HYDROcodone-acetaminophen 5-325 MG per tablet    NORCO    20 tablet    Take 1-2 tablets by mouth every 6 hours as needed for pain    Post-op pain       * lidocaine (viscous) 2 % solution    XYLOCAINE    100 mL    Take 15 mLs by mouth every 2 hours as needed for moderate pain swish and spit every 3-8 hours as needed; max 8 doses/24 hour period    Mucositis due to chemotherapy       * lidocaine (viscous) 2 % solution    XYLOCAINE    100 mL    Apply 10 mLs topically every 4 hours as needed for moderate pain Apply to dimitris-anal area every 4 hours as needed for pain.    Malignant neoplasm of anal canal (H)       oxyCODONE 5 MG capsule    OXY-IR    60 capsule    Take 1 capsule (5 mg) by mouth every 4 hours as needed for severe pain    Malignant neoplasm of anal canal (H)       prochlorperazine 10 MG tablet    COMPAZINE    30 tablet    Take 1 tablet (10 mg) by mouth every 6 hours as needed (Nausea/Vomiting)    Malignant neoplasm of anal canal (H)       silver sulfADIAZINE 1 % cream    SILVADENE    25 g    Apply topically 2 times daily    Malignant neoplasm of anal canal (H)       * Notice:  This list has 2 medication(s) that are the same as other medications prescribed for you. Read the directions carefully, and ask your doctor or other care provider to review them with you.

## 2018-10-03 NOTE — PROGRESS NOTES
[facility] PHYSICIANS  SPECIALIZING IN BREAKTHROUGHS  Radiation Oncology    On Treatment Visit Note      Jenise Calix      Date: 10/3/2018   MRN: [id number]   : 1950         Reason for Visit:  On Radiation Treatment Visit     Treatment Summary to Date   Anal canal/ Pelvic + Inguinal nodes  1800 cGy/ 5400 cGy  10/30          Subjective:   More fatigued. Has developed mild dimitris-anal skin pain discomfort. No  or GI bother. Has had continued mucositis from 5FU, has viscous lidocaine.    Nursing ROS:           Objective:   There were no vitals taken for this visit.  No apparent distress.   Skin - mild erythema and desquamation in dimitris-anal area and vaginal introitus.   Anal canal - protruding mass slightly decreased since starting RT.     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  All questions and concerns addressed.    Plan:   1. Continue current therapy.    2. Continue aquaphor application to skin. Hydrocortisone PRN pruritus. Prescribed silvadene and topical lidocaine for dimitris-anal area and vaginal area with skin breakdown. Prescribed oxycodone 5mg PRN should pain develop.  3. Imodium PRN loose stool.       Mosaiq chart and setup information reviewed  Ports checked      Edmundo Iqbal MD

## 2018-10-04 ENCOUNTER — TELEPHONE (OUTPATIENT)
Dept: ONCOLOGY | Facility: CLINIC | Age: 68
End: 2018-10-04

## 2018-10-04 ENCOUNTER — APPOINTMENT (OUTPATIENT)
Dept: RADIATION THERAPY | Facility: OUTPATIENT CENTER | Age: 68
End: 2018-10-04
Payer: COMMERCIAL

## 2018-10-04 NOTE — TELEPHONE ENCOUNTER
Insurance requires a prior auth for lidocaine 2% gel    Medica part d  ID [id number]  [phone]    Thanks!    [facility] [place]  [phone]

## 2018-10-05 ENCOUNTER — APPOINTMENT (OUTPATIENT)
Dept: RADIATION THERAPY | Facility: OUTPATIENT CENTER | Age: 68
End: 2018-10-05
Payer: COMMERCIAL

## 2018-10-08 ENCOUNTER — APPOINTMENT (OUTPATIENT)
Dept: RADIATION THERAPY | Facility: OUTPATIENT CENTER | Age: 68
End: 2018-10-08
Payer: COMMERCIAL

## 2018-10-09 ENCOUNTER — APPOINTMENT (OUTPATIENT)
Dept: RADIATION THERAPY | Facility: OUTPATIENT CENTER | Age: 68
End: 2018-10-09
Payer: COMMERCIAL

## 2018-10-10 ENCOUNTER — OFFICE VISIT (OUTPATIENT)
Dept: RADIATION THERAPY | Facility: OUTPATIENT CENTER | Age: 68
End: 2018-10-10
Payer: COMMERCIAL

## 2018-10-10 ENCOUNTER — ONCOLOGY VISIT (OUTPATIENT)
Dept: ONCOLOGY | Facility: CLINIC | Age: 68
End: 2018-10-10
Attending: INTERNAL MEDICINE
Payer: COMMERCIAL

## 2018-10-10 ENCOUNTER — APPOINTMENT (OUTPATIENT)
Dept: RADIATION THERAPY | Facility: OUTPATIENT CENTER | Age: 68
End: 2018-10-10
Payer: COMMERCIAL

## 2018-10-10 VITALS
OXYGEN SATURATION: 98 % | BODY MASS INDEX: 25.37 KG/M2 | DIASTOLIC BLOOD PRESSURE: 51 MMHG | WEIGHT: 162 LBS | HEART RATE: 71 BPM | SYSTOLIC BLOOD PRESSURE: 124 MMHG | RESPIRATION RATE: 20 BRPM | TEMPERATURE: 97.7 F

## 2018-10-10 VITALS
BODY MASS INDEX: 25.37 KG/M2 | DIASTOLIC BLOOD PRESSURE: 78 MMHG | WEIGHT: 162 LBS | SYSTOLIC BLOOD PRESSURE: 119 MMHG | HEART RATE: 71 BPM

## 2018-10-10 DIAGNOSIS — I48.20 CHRONIC ATRIAL FIBRILLATION (H): ICD-10-CM

## 2018-10-10 DIAGNOSIS — C21.1 MALIGNANT NEOPLASM OF ANAL CANAL (H): Primary | ICD-10-CM

## 2018-10-10 PROCEDURE — 99214 OFFICE O/P EST MOD 30 MIN: CPT | Performed by: INTERNAL MEDICINE

## 2018-10-10 ASSESSMENT — PAIN SCALES - GENERAL: PAINLEVEL: MILD PAIN (3)

## 2018-10-10 NOTE — PATIENT INSTRUCTIONS
We would like you to have chemotherapy per treatment plan. Dr. Gunn would like to see you back in 2 weeks for a follow up appointment.      When you are in need of a refill, please call your pharmacy and they will send us a request.      Copy of appointments, and after visit summary (AVS) given to patient.      If you have any questions during business hours (M-F 8 AM- 4PM), please call Rupa Plascencia RN Oncology Hematology  at [facility] [phone].       For questions after business hours, or on holidays/weekends, please call our after hours Nurse Triage line [phone]. Thank you.

## 2018-10-10 NOTE — MR AVS SNAPSHOT
After Visit Summary   10/10/2018    Jenise Calix    MRN: [id number]           Patient Information     Date Of Birth          1950        Visit Information        Provider Department      10/10/2018 10:45 AM Edmundo Iqbal MD [facility]        Today's Diagnoses     Malignant neoplasm of anal canal (H)    -  1       Follow-ups after your visit        Your next 10 appointments already scheduled     Oct 11, 2018 10:30 AM CDT   Linear Accelerator with Lr [facility] Rad Tech   [facility] ([facility])    [address], [address]  [place] [address]   [phone]            Oct 12, 2018 10:30 AM CDT   Linear Accelerator with Lr [facility] Rad Tech   [facility] ([facility])    [address], [address]  [place] [address]   [phone]            Oct 15, 2018 10:30 AM CDT   Linear Accelerator with Lr [facility] Rad Tech   [facility] ([facility])    [address]  [facility] [address]   [phone]            Oct 16, 2018 10:30 AM CDT   Linear Accelerator with Lr [facility] Rad Tech   [facility] ([facility])    [address]  [place] [address]   [phone]            Oct 17, 2018 10:30 AM CDT   Linear Accelerator with Lr [facility] Rad Tech   [facility] ([facility])    [address], [address]  [place] [address]   [phone]            Oct 17, 2018 10:45 AM CDT   ON TREATMENT VISIT with Edmundo Iqbal MD   [facility] ([facility])    [address]  [place] [address]   [phone]            Oct 18, 2018  9:15 AM CDT   Linear Accelerator with Lr [facility] Rad Tech   [facility] ([facility])    [address], [address]  [place] [address]   [phone]            Oct 18, 2018 10:00 AM CDT   Level 3 with ROOM 7 [facility]  [facility])    [facility]  [address]  [facility] [address]   [phone]            Oct 19, 2018 10:30 AM CDT   Linear Accelerator with Lr [facility]   [facility] ([facility])    [address]  [facility] [address]   [phone]            Oct 22, 2018 12:00 PM CDT   Level O with ROOM 4 [facility] ([facility])    [facility]  [address]  [facility] [address]   [phone]              Who to contact     Please call your clinic at [phone] to:    Ask questions about your health    Make or cancel appointments    Discuss your medicines    Learn about your test results    Speak to your doctor            Additional Information About Your Visit        Care EveryWhere ID     This is your Care EveryWhere ID. This could be used by other organizations to access your [place] medical records  [id number]        Your Vitals Were     Pulse BMI (Body Mass Index)                71 25.37 kg/m2           Blood Pressure from Last 3 Encounters:   10/10/18 124/51   10/10/18 119/78   10/03/18 114/79    Weight from Last 3 Encounters:   10/10/18 73.5 kg (162 lb)   10/10/18 73.5 kg (162 lb)   10/03/18 72.1 kg (159 lb)              Today, you had the following     No orders found for display       Primary Care Provider Fax #    Physician No Ref-Primary [phone]       No address on file        Equal Access to Services     AGUSTIN HAMILTON : Hadii aad ku hadasho Soomaali, waaxda luqadaha, qaybta kaalmada adeegyada, katina idiin hayciro whitaker . So [facility] [phone].    ATENCIÓN: Si habla español, tiene a sanchez disposición servicios gratuitos de asistencia lingüística. Llame al [phone].    We comply with applicable federal civil rights laws and [place] laws. We do not discriminate on the basis of race, color, national origin, age, disability, sex, sexual orientation, or  gender identity.            Thank you!     Thank you for choosing [facility]  for your care. Our goal is always to provide you with excellent care. Hearing back from our patients is one way we can continue to improve our services. Please take a few minutes to complete the written survey that you may receive in the mail after your visit with us. Thank you!             Your Updated Medication List - Protect others around you: Learn how to safely use, store and throw away your medicines at www.disposemymeds.org.          This list is accurate as of 10/10/18 11:52 AM.  Always use your most recent med list.                   Brand Name Dispense Instructions for use Diagnosis    aspirin 81 MG tablet      Take 81 mg by mouth        HYDROcodone-acetaminophen 5-325 MG per tablet    NORCO    20 tablet    Take 1-2 tablets by mouth every 6 hours as needed for pain    Post-op pain       * lidocaine (viscous) 2 % solution    XYLOCAINE    100 mL    Take 15 mLs by mouth every 2 hours as needed for moderate pain swish and spit every 3-8 hours as needed; max 8 doses/24 hour period    Mucositis due to chemotherapy       * lidocaine (viscous) 2 % solution    XYLOCAINE    100 mL    Apply 10 mLs topically every 4 hours as needed for moderate pain Apply to dimitris-anal area every 4 hours as needed for pain.    Malignant neoplasm of anal canal (H)       lidocaine 2 % topical gel    XYLOCAINE          oxyCODONE 5 MG capsule    OXY-IR    60 capsule    Take 1 capsule (5 mg) by mouth every 4 hours as needed for severe pain    Malignant neoplasm of anal canal (H)       prochlorperazine 10 MG tablet    COMPAZINE    30 tablet    Take 1 tablet (10 mg) by mouth every 6 hours as needed (Nausea/Vomiting)    Malignant neoplasm of anal canal (H)       silver sulfADIAZINE 1 % cream    SILVADENE    25 g    Apply topically 2 times daily    Malignant neoplasm of anal canal (H)       * Notice:  This list has 2 medication(s) that are the same as  other medications prescribed for you. Read the directions carefully, and ask your doctor or other care provider to review them with you.

## 2018-10-10 NOTE — LETTER
10/10/2018      RE: Jenise Calix  [address] [facility] [facility] [address]       [facility] PHYSICIANS  SPECIALIZING IN BREAKTHROUGHS  Radiation Oncology    On Treatment Visit Note      Jenise Calix      Date: 10/10/2018   MRN: [id number]   : 1950         Reason for Visit:  On Radiation Treatment Visit     Treatment Summary to Date   Anal canal/ Pelvic + Inguinal nodes  2700 cGy/ 5400 cGy  15/30          Subjective:   More fatigued. Worsening mild dimitris-anal skin pain discomfort. No  or GI bother. Has had continued mucositis from 5FU, has viscous lidocaine, improved. Using silvadene and viscous topical lidocaine for skin care. Using rinsing bottle for dimitris-anal area. Oxycodone 5mg PRN for pain, controlling at this point.    Nursing ROS:           Objective:   There were no vitals taken for this visit.  No apparent distress.   Skin - mild erythema and desquamation in dimitris-anal area and vaginal introitus.   Anal canal - protruding mass significantly decreased since starting RT.     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  All questions and concerns addressed.    Plan:   1. Continue current therapy.    2. Continue aquaphor application to skin. Hydrocortisone PRN pruritus. Continue silvadene and topical lidocaine for  dimitris-anal area and vaginal area with skin breakdown. Continue oxycodone 5mg PRN pain (using 2x per day).  3. Imodium PRN loose stool.       Mosaiq chart and setup information reviewed  Ports checked      Edmundo Iqbal MD

## 2018-10-10 NOTE — MR AVS SNAPSHOT
After Visit Summary   10/10/2018    Jenise Calix    MRN: [id number]           Patient Information     Date Of Birth          1950        Visit Information        Provider Department      10/10/2018 11:20 AM Ced Gunn MD [facility] ONCOLOGY      Today's Diagnoses     Malignant neoplasm of anal canal (H)    -  1      Care Instructions    We would like you to have chemotherapy per treatment plan. Dr. Gunn would like to see you back in 2 weeks for a follow up appointment.      When you are in need of a refill, please call your pharmacy and they will send us a request.      Copy of appointments, and after visit summary (AVS) given to patient.      If you have any questions during business hours (M-F 8 AM- 4PM), please call Rupa Plascencia RN Oncology Hematology  at [facility] [phone].       For questions after business hours, or on holidays/weekends, please call our after hours Nurse Triage line [phone]. Thank you.            Follow-ups after your visit        Follow-up notes from your care team     Return in about 2 weeks (around 10/24/2018) for Schedule for chemotherapy as per treatment plan.      Your next 10 appointments already scheduled     Oct 11, 2018 10:30 AM CDT   Linear Accelerator with Lr p Rad Tech   Radiation Therapy Center ([facility])    [address] [address]  [place] [facility] [place] [address]   [phone]            Oct 12, 2018 10:30 AM CDT   Linear Accelerator with Lr p Rad Tech   Radiation Therapy Center ([facility])    [address], [address]  [place] [place] [facility] [place] [address]   [phone]            Oct 15, 2018 10:30 AM CDT   Linear Accelerator with Lr Ump Rad Tech   Radiation Therapy Center ([facility])    [address], [address]  [facility] [place] [facility] [place] [address]   [phone]            Oct 16, 2018 10:30 AM CDT   Linear Accelerator with Lr Ump Rad Tech   Radiation  Therapy Center ([facility])    [address], [address]  [place] [address]   [phone]            Oct 17, 2018 10:30 AM CDT   Linear Accelerator with Lr [facility] Rad [facility]   Radiation Therapy Center ([facility])    [address], [address]  [place] [address]   [phone]            Oct 17, 2018 10:45 AM CDT   ON TREATMENT VISIT with Edmundo Iqbal MD   Radiation Therapy Center ([facility])    [address], [address]  [place] [address]   [phone]            Oct 18, 2018  9:15 AM CDT   Linear Accelerator with Lr [facility] Rad [facility]   Radiation Therapy Center ([facility])    [address]  [place] [address]   [phone]            Oct 18, 2018 10:00 AM CDT   Level 3 with ROOM 7 [facility]fusion ([facility])    [facility]  [address]  [facility] [place] [address]   [phone]            Oct 19, 2018 10:30 AM CDT   Linear Accelerator with Lr [facility] Rad [facility]   Radiation Therapy Center ([facility])    [address]  [place] [address]   [phone]            Oct 22, 2018 12:00 PM CDT   Level O with ROOM 4 [facility]fusion ([facility])    [facility]  [address]  [facility] [place] [address]   [phone]              Who to contact     If you have questions or need follow up information about today's clinic visit or your schedule please contact [facility] directly at [phone].  Normal or non-critical lab and imaging results will be communicated to you by MyChart, letter or phone within 4 business days after the clinic has received the results. If you do not hear from us within 7 days, please contact the clinic through MyChart or phone. If you have a critical or abnormal lab result, we will notify you by phone as soon as possible.  Submit refill requests through [employer] or call  your pharmacy and they will forward the refill request to us. Please allow 3 business days for your refill to be completed.          Additional Information About Your Visit        Care EveryWhere ID     This is your Care EveryWhere ID. This could be used by other organizations to access your [facility] medical records  [id number]        Your Vitals Were     Pulse Temperature Respirations Pulse Oximetry BMI (Body Mass Index)       71 97.7  F (36.5  C) (Tympanic) 20 98% 25.37 kg/m2        Blood Pressure from Last 3 Encounters:   10/10/18 124/51   10/10/18 119/78   10/03/18 114/79    Weight from Last 3 Encounters:   10/10/18 73.5 kg (162 lb)   10/10/18 73.5 kg (162 lb)   10/03/18 72.1 kg (159 lb)              Today, you had the following     No orders found for display       Primary Care Provider Fax #    Physician No Ref-Primary [phone]       No address on file        Equal Access to Services     AGUSTIN HAMILTON : Radha Brown, wapretty mon, qacinthya kaalmaadam moore, katina whitaker . So [facility] [phone].    ATENCIÓN: Si habla español, tiene a sanchez disposición servicios gratuitos de asistencia lingüística. Llame al [phone].    We comply with applicable federal civil rights laws and [place] laws. We do not discriminate on the basis of race, color, national origin, age, disability, sex, sexual orientation, or gender identity.            Thank you!     Thank you for choosing [facility] CANCER [facility]  for your care. Our goal is always to provide you with excellent care. Hearing back from our patients is one way we can continue to improve our services. Please take a few minutes to complete the written survey that you may receive in the mail after your visit with us. Thank you!             Your Updated Medication List - Protect others around you: Learn how to safely use, store and throw away your medicines at www.disposemymeds.org.          This list is accurate as of  10/10/18 11:44 AM.  Always use your most recent med list.                   Brand Name Dispense Instructions for use Diagnosis    aspirin 81 MG tablet      Take 81 mg by mouth        HYDROcodone-acetaminophen 5-325 MG per tablet    NORCO    20 tablet    Take 1-2 tablets by mouth every 6 hours as needed for pain    Post-op pain       * lidocaine (viscous) 2 % solution    XYLOCAINE    100 mL    Take 15 mLs by mouth every 2 hours as needed for moderate pain swish and spit every 3-8 hours as needed; max 8 doses/24 hour period    Mucositis due to chemotherapy       * lidocaine (viscous) 2 % solution    XYLOCAINE    100 mL    Apply 10 mLs topically every 4 hours as needed for moderate pain Apply to dimitris-anal area every 4 hours as needed for pain.    Malignant neoplasm of anal canal (H)       lidocaine 2 % topical gel    XYLOCAINE          oxyCODONE 5 MG capsule    OXY-IR    60 capsule    Take 1 capsule (5 mg) by mouth every 4 hours as needed for severe pain    Malignant neoplasm of anal canal (H)       prochlorperazine 10 MG tablet    COMPAZINE    30 tablet    Take 1 tablet (10 mg) by mouth every 6 hours as needed (Nausea/Vomiting)    Malignant neoplasm of anal canal (H)       silver sulfADIAZINE 1 % cream    SILVADENE    25 g    Apply topically 2 times daily    Malignant neoplasm of anal canal (H)       * Notice:  This list has 2 medication(s) that are the same as other medications prescribed for you. Read the directions carefully, and ask your doctor or other care provider to review them with you.

## 2018-10-10 NOTE — ASSESSMENT & PLAN NOTE
Jenise Calix has been difficulty with hemorrhoids for several years.  She saw  last year in September 2017 because of large hemorrhoids and it was recommended to proceed with surgery.  Patient elected to postpone the surgery and go to [place] to visit her children.  She continued to have difficulty with defecation.  She has been having also pain with bowel movement as well.  She has been taking stool softener.  She has been also having occasional rectal bleeding.  She recently saw Dr. Becerra on July 16, 2018 and had a flexible sigmoidoscopy which showed severely ulcerated lesion at 12 o'clock position was definitive irregularity and fullness at 6 o'clock position.  Biopsies were obtained.  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  Patient had a CT scan of the chest abdomen and pelvis on July 16, 2018 which revealed 0.9 cm nodule he will aspect of the right breast felt to be sebaceous cyst.  There was a mass involving the anus and the rectum particularly on the right measuring about 7 cm x 4 cm and extending anterior to posterior 5 cm posterior and 10 cm in longest axis.  This is inseparable from the floor of urinary bladder, extending to the skin service and engulfing the vagina and urethra.  The perirectal and anal lymph nodes were involved.  There is also a 3 cm mass in the left inguinal nodes.  PET scan was done showing  intensely hypermetabolic mass in the anal canal consistent with primary malignancy.  There is hypermetabolic adenopathy in the left inguinal location worrisome for malignant adenopathy.. There is a focal hypermetabolic area in the left thyroid indeterminant.. No suggestion of metastatic disease.    Patient started on radiation radiation therapy concurrent with infusional 5-FU and mitomycin.

## 2018-10-10 NOTE — PROGRESS NOTES
Hematology/ Oncology Follow-up Visit:  Oct 10, 2018    Reason for Visit:   Chief Complaint   Patient presents with     Oncology Clinic Visit     f/up Malignant neoplasm of anal canal       Oncologic History:    Malignant neoplasm of anal canal (H)  Jenise Calix has been difficulty with hemorrhoids for several years.  She saw  last year in September 2017 because of large hemorrhoids and it was recommended to proceed with surgery.  Patient elected to postpone the surgery and go to [place] to visit her children.  She continued to have difficulty with defecation.  She has been having also pain with bowel movement as well.  She has been taking stool softener.  She has been also having occasional rectal bleeding.  She recently saw Dr. Becerra on July 16, 2018 and had a flexible sigmoidoscopy which showed severely ulcerated lesion at 12 o'clock position was definitive irregularity and fullness at 6 o'clock position.  Biopsies were obtained.  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  Patient had a CT scan of the chest abdomen and pelvis on July 16, 2018 which revealed 0.9 cm nodule he will aspect of the right breast felt to be sebaceous cyst.  There was a mass involving the anus and the rectum particularly on the right measuring about 7 cm x 4 cm and extending anterior to posterior 5 cm posterior and 10 cm in longest axis.  This is inseparable from the floor of urinary bladder, extending to the skin service and engulfing the vagina and urethra.  The perirectal and anal lymph nodes were involved.  There is also a 3 cm mass in the left inguinal nodes.  PET scan was done showing  intensely hypermetabolic mass in the anal canal consistent with primary malignancy.  There is hypermetabolic adenopathy in the left inguinal location worrisome for malignant adenopathy.. There is a  focal hypermetabolic area in the left thyroid indeterminant.. No suggestion of metastatic disease.    Patient started on radiation radiation therapy concurrent with infusional 5-FU and mitomycin.    Interval History:  Patient returning today 2 weeks following starting chemoradiation protocol.  She has been tolerating very well without any nausea or vomiting.  She has irritation at the radiation site.  There is no diarrhea or urinary symptoms.  Patient denies any shortness of breath or cough or wheezing.    Review Of Systems:  Constitutional: Negative for fever, chills, and night sweats.  Skin: negative.  Eyes: negative.  Ears/Nose/Throat: negative.  Respiratory: No shortness of breath, dyspnea on exertion, cough, or hemoptysis.  Cardiovascular: negative.  Gastrointestinal: negative.  Genitourinary: negative.  Musculoskeletal: negative.  Neurologic: negative.  Psychiatric: negative.  Hematologic/Lymphatic/Immunologic: negative.  Endocrine: negative.    All other ROS negative unless mentioned in interval history.    Past medical, social, surgical, and family histories reviewed.    Allergies:  Allergies as of 10/10/2018 - Chau as Reviewed 10/10/2018   Allergen Reaction Noted     Metoprolol  07/26/2018       Current Medications:  Current Outpatient Prescriptions   Medication Sig Dispense Refill     aspirin 81 MG tablet Take 81 mg by mouth       lidocaine (XYLOCAINE) 2 % topical gel        lidocaine, viscous, (XYLOCAINE) 2 % solution Apply 10 mLs topically every 4 hours as needed for moderate pain Apply to dimitris-anal area every 4 hours as needed for pain. 100 mL 3     oxyCODONE (OXY-IR) 5 MG capsule Take 1 capsule (5 mg) by mouth every 4 hours as needed for severe pain 60 capsule 0     silver sulfADIAZINE (SILVADENE) 1 % cream Apply topically 2 times daily 25 g 1     HYDROcodone-acetaminophen (NORCO) 5-325 MG per tablet Take 1-2 tablets by mouth every 6 hours as needed for pain (Patient not taking: Reported on 10/10/2018)  20 tablet 0     lidocaine, viscous, (XYLOCAINE) 2 % solution Take 15 mLs by mouth every 2 hours as needed for moderate pain swish and spit every 3-8 hours as needed; max 8 doses/24 hour period (Patient not taking: Reported on 10/10/2018) 100 mL 3     prochlorperazine (COMPAZINE) 10 MG tablet Take 1 tablet (10 mg) by mouth every 6 hours as needed (Nausea/Vomiting) (Patient not taking: Reported on 10/10/2018) 30 tablet 1        Physical Exam:  /51 (BP Location: Right arm, Patient Position: Sitting, Cuff Size: Adult Regular)  Pulse 71  Temp 97.7  F (36.5  C) (Tympanic)  Resp 20  Wt 73.5 kg (162 lb)  SpO2 98%  BMI 25.37 kg/m2  Wt Readings from Last 12 Encounters:   10/10/18 73.5 kg (162 lb)   10/10/18 73.5 kg (162 lb)   10/03/18 72.1 kg (159 lb)   09/26/18 74.8 kg (165 lb)   09/18/18 76.7 kg (169 lb)   09/14/18 76.7 kg (169 lb)   08/30/18 76.9 kg (169 lb 9.6 oz)   08/29/18 77.2 kg (170 lb 3.2 oz)   08/23/18 77.4 kg (170 lb 11.2 oz)     ECOG performance status: 1  GENERAL APPEARANCE: Healthy, alert and in no acute distress.  HEENT: Sclerae anicteric. PERRLA. Oropharynx without ulcers, lesions, or thrush.  NECK: Supple. No asymmetry or masses.  LYMPHATICS: No palpable cervical, supraclavicular, axillary, or inguinal lymphadenopathy.  RESP: Lungs clear to auscultation bilaterally without rales, rhonchi or wheezes.  CARDIOVASCULAR: Regular rate and rhythm. Normal S1, S2; no S3 or S4. No murmur, gallop, or rub.  ABDOMEN: Soft, nontender. Bowel sounds normal. No palpable organomegaly or masses.  MUSCULOSKELETAL: Extremities without gross deformities noted. No edema of bilateral lower extremities.  SKIN: No suspicious lesions or rashes.  NEURO: Alert and oriented x 3. Cranial nerves II-XII grossly intact.  PSYCHIATRIC: Mentation and affect appear normal.    Laboratory/Imaging Studies:  No visits with results within 2 Week(s) from this visit.  Latest known visit with results is:    Infusion Therapy Visit on  09/20/2018   Component Date Value Ref Range Status     WBC 09/20/2018 11.7* 4.0 - 11.0 10e9/L Final     RBC Count 09/20/2018 4.80  3.8 - 5.2 10e12/L Final     Hemoglobin 09/20/2018 15.6  11.7 - 15.7 g/dL Final     Hematocrit 09/20/2018 46.6  35.0 - 47.0 % Final     MCV 09/20/2018 97  78 - 100 fl Final     MCH 09/20/2018 32.5  26.5 - 33.0 pg Final     MCHC 09/20/2018 33.5  31.5 - 36.5 g/dL Final     RDW 09/20/2018 12.4  10.0 - 15.0 % Final     Platelet Count 09/20/2018 198  150 - 450 10e9/L Final     Diff Method 09/20/2018 Automated Method   Final     % Neutrophils 09/20/2018 71.2  % Final     % Lymphocytes 09/20/2018 22.3  % Final     % Monocytes 09/20/2018 5.7  % Final     % Eosinophils 09/20/2018 0.2  % Final     % Basophils 09/20/2018 0.3  % Final     % Immature Granulocytes 09/20/2018 0.3  % Final     Nucleated RBCs 09/20/2018 0  0 /100 Final     Absolute Neutrophil 09/20/2018 8.3  1.6 - 8.3 10e9/L Final     Absolute Lymphocytes 09/20/2018 2.6  0.8 - 5.3 10e9/L Final     Absolute Monocytes 09/20/2018 0.7  0.0 - 1.3 10e9/L Final     Absolute Eosinophils 09/20/2018 0.0  0.0 - 0.7 10e9/L Final     Absolute Basophils 09/20/2018 0.0  0.0 - 0.2 10e9/L Final     Abs Immature Granulocytes 09/20/2018 0.0  0 - 0.4 10e9/L Final     Absolute Nucleated RBC 09/20/2018 0.0   Final     Sodium 09/20/2018 139  133 - 144 mmol/L Final     Potassium 09/20/2018 3.9  3.4 - 5.3 mmol/L Final     Chloride 09/20/2018 106  94 - 109 mmol/L Final     Carbon Dioxide 09/20/2018 28  20 - 32 mmol/L Final     Anion Gap 09/20/2018 5  3 - 14 mmol/L Final     Glucose 09/20/2018 73  70 - 99 mg/dL Final     Urea Nitrogen 09/20/2018 14  7 - 30 mg/dL Final     Creatinine 09/20/2018 0.67  0.52 - 1.04 mg/dL Final     GFR Estimate 09/20/2018 88  >60 mL/min/1.7m2 Final    Non  GFR Calc     GFR Estimate If Black 09/20/2018 >90  >60 mL/min/1.7m2 Final    African American GFR Calc     Calcium 09/20/2018 9.8  8.5 - 10.1 mg/dL Final     Bilirubin  Total 09/20/2018 0.6  0.2 - 1.3 mg/dL Final     Albumin 09/20/2018 3.7  3.4 - 5.0 g/dL Final     Protein Total 09/20/2018 7.2  6.8 - 8.8 g/dL Final     Alkaline Phosphatase 09/20/2018 101  40 - 150 U/L Final     ALT 09/20/2018 12  0 - 50 U/L Final     AST 09/20/2018 11  0 - 45 U/L Final        Recent Results (from the past 744 hour(s))   XR Surgery VISH Fluoro L/T 5 Min w Stills    Narrative    SURGERY C-ARM FLUOROSCOPY LESS THAN FIVE MINUTES WITH STILLS   9/18/2018 12:53 PM     COMPARISON:  PET/CT 8/29/2018.    HISTORY: Port-A-Cath.     NUMBER OF IMAGES ACQUIRED: 2    VIEWS: 2    FLUOROSCOPY TIME: 0.13 minutes.      Impression    IMPRESSION: Intraoperative images demonstrate left IJ Port-A-Cath with  tip projecting over the mid SVC. Please see operative report for  further details.    MARISSA PITT MD   XR Chest Port 1 View    Narrative    XR CHEST PORT 1 VW 9/18/2018 1:17 PM    HISTORY: PowerPort placement.    COMPARISON: None.      Impression    IMPRESSION: A single view of the chest shows a left-sided power port  in place with the tip in the expected region of the mid SVC. No acute  cardiopulmonary disease is demonstrated.     MARSHA VICENTE MD       Assessment and plan:    (C21.1) Malignant neoplasm of anal canal (H)  (primary encounter diagnosis)  Patient will continue his chemoradiation.  She has been tolerating chemo very well without significant side effects.  We will continue with the current plan.  I will see the patient again in 2 weeks sooner if there are new developments or concerns.    The patient is ready to learn, no apparent learning barriers were identified.  Diagnosis and treatment plans were explained to the patient. The patient expressed understanding of the content. The patient asked appropriate questions. The patient questions were answered to her satisfaction.    Time spent 25 minutes more than 50% of the time in counseling and coordination of care including discussion of potential side  effects of chemotherapy, symptom management, follow-up and prognosis.    Chart documentation with Dragon Voice recognition Software. Although reviewed after completion, some words and grammatical errors may remain.

## 2018-10-10 NOTE — PROGRESS NOTES
[facility] PHYSICIANS  SPECIALIZING IN BREAKTHROUGHS  Radiation Oncology    On Treatment Visit Note      Jenise Calix      Date: 10/10/2018   MRN: [id number]   : 1950         Reason for Visit:  On Radiation Treatment Visit     Treatment Summary to Date   Anal canal/ Pelvic + Inguinal nodes  2700 cGy/ 5400 cGy  15/30          Subjective:   More fatigued. Worsening mild dimitris-anal skin pain discomfort. No  or GI bother. Has had continued mucositis from 5FU, has viscous lidocaine, improved. Using silvadene and viscous topical lidocaine for skin care. Using rinsing bottle for dimitris-anal area. Oxycodone 5mg PRN for pain, controlling at this point.    Nursing ROS:           Objective:   There were no vitals taken for this visit.  No apparent distress.   Skin - mild erythema and desquamation in dimitris-anal area and vaginal introitus.   Anal canal - protruding mass significantly decreased since starting RT.     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  All questions and concerns addressed.    Plan:   1. Continue current therapy.    2. Continue aquaphor application to skin. Hydrocortisone PRN pruritus. Continue silvadene and topical lidocaine for dimitris-anal area and vaginal area with skin breakdown. Continue oxycodone 5mg PRN  pain (using 2x per day).  3. Imodium PRN loose stool.       Mosaiq chart and setup information reviewed  Ports checked      Edmundo Iqbal MD

## 2018-10-10 NOTE — LETTER
10/10/2018         RE: Jenise Calix  [address]  [place] [address]        Dear Colleague,    Thank you for referring your patient, Jenise Calix, to the [facility]. Please see a copy of my visit note below.    Hematology/ Oncology Follow-up Visit:  Oct 10, 2018    Reason for Visit:   Chief Complaint   Patient presents with     Oncology Clinic Visit     f/up Malignant neoplasm of anal canal       Oncologic History:    Malignant neoplasm of anal canal (H)  Jenise Calix has been difficulty with hemorrhoids for several years.  She saw  last year in September 2017 because of large hemorrhoids and it was recommended to proceed with surgery.  Patient elected to postpone the surgery and go to [place] to visit her children.  She continued to have difficulty with defecation.  She has been having also pain with bowel movement as well.  She has been taking stool softener.  She has been also having occasional rectal bleeding.  She recently saw Dr. Becerra on July 16, 2018 and had a flexible sigmoidoscopy which showed severely ulcerated lesion at 12 o'clock position was definitive irregularity and fullness at 6 o'clock position.  Biopsies were obtained.  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  Patient had a CT scan of the chest abdomen and pelvis on July 16, 2018 which revealed 0.9 cm nodule he will aspect of the right breast felt to be sebaceous cyst.  There was a mass involving the anus and the rectum particularly on the right measuring about 7 cm x 4 cm and extending anterior to posterior 5 cm posterior and 10 cm in longest axis.  This is inseparable from the floor of urinary bladder, extending to the skin service and engulfing the vagina and urethra.  The perirectal and anal lymph nodes were involved.  There is also a 3 cm mass in the left  inguinal nodes.  PET scan was done showing  intensely hypermetabolic mass in the anal canal consistent with primary malignancy.  There is hypermetabolic adenopathy in the left inguinal location worrisome for malignant adenopathy.. There is a focal hypermetabolic area in the left thyroid indeterminant.. No suggestion of metastatic disease.    Patient started on radiation radiation therapy concurrent with infusional 5-FU and mitomycin.    Interval History:  Patient returning today 2 weeks following starting chemoradiation protocol.  She has been tolerating very well without any nausea or vomiting.  She has irritation at the radiation site.  There is no diarrhea or urinary symptoms.  Patient denies any shortness of breath or cough or wheezing.    Review Of Systems:  Constitutional: Negative for fever, chills, and night sweats.  Skin: negative.  Eyes: negative.  Ears/Nose/Throat: negative.  Respiratory: No shortness of breath, dyspnea on exertion, cough, or hemoptysis.  Cardiovascular: negative.  Gastrointestinal: negative.  Genitourinary: negative.  Musculoskeletal: negative.  Neurologic: negative.  Psychiatric: negative.  Hematologic/Lymphatic/Immunologic: negative.  Endocrine: negative.    All other ROS negative unless mentioned in interval history.    Past medical, social, surgical, and family histories reviewed.    Allergies:  Allergies as of 10/10/2018 - Chau as Reviewed 10/10/2018   Allergen Reaction Noted     Metoprolol  07/26/2018       Current Medications:  Current Outpatient Prescriptions   Medication Sig Dispense Refill     aspirin 81 MG tablet Take 81 mg by mouth       lidocaine (XYLOCAINE) 2 % topical gel        lidocaine, viscous, (XYLOCAINE) 2 % solution Apply 10 mLs topically every 4 hours as needed for moderate pain Apply to dimitris-anal area every 4 hours as needed for pain. 100 mL 3     oxyCODONE (OXY-IR) 5 MG capsule Take 1 capsule (5 mg) by mouth every 4 hours as needed for severe pain 60 capsule 0      silver sulfADIAZINE (SILVADENE) 1 % cream Apply topically 2 times daily 25 g 1     HYDROcodone-acetaminophen (NORCO) 5-325 MG per tablet Take 1-2 tablets by mouth every 6 hours as needed for pain (Patient not taking: Reported on 10/10/2018) 20 tablet 0     lidocaine, viscous, (XYLOCAINE) 2 % solution Take 15 mLs by mouth every 2 hours as needed for moderate pain swish and spit every 3-8 hours as needed; max 8 doses/24 hour period (Patient not taking: Reported on 10/10/2018) 100 mL 3     prochlorperazine (COMPAZINE) 10 MG tablet Take 1 tablet (10 mg) by mouth every 6 hours as needed (Nausea/Vomiting) (Patient not taking: Reported on 10/10/2018) 30 tablet 1        Physical Exam:  /51 (BP Location: Right arm, Patient Position: Sitting, Cuff Size: Adult Regular)  Pulse 71  Temp 97.7  F (36.5  C) (Tympanic)  Resp 20  Wt 73.5 kg (162 lb)  SpO2 98%  BMI 25.37 kg/m2  Wt Readings from Last 12 Encounters:   10/10/18 73.5 kg (162 lb)   10/10/18 73.5 kg (162 lb)   10/03/18 72.1 kg (159 lb)   09/26/18 74.8 kg (165 lb)   09/18/18 76.7 kg (169 lb)   09/14/18 76.7 kg (169 lb)   08/30/18 76.9 kg (169 lb 9.6 oz)   08/29/18 77.2 kg (170 lb 3.2 oz)   08/23/18 77.4 kg (170 lb 11.2 oz)     ECOG performance status: 1  GENERAL APPEARANCE: Healthy, alert and in no acute distress.  HEENT: Sclerae anicteric. PERRLA. Oropharynx without ulcers, lesions, or thrush.  NECK: Supple. No asymmetry or masses.  LYMPHATICS: No palpable cervical, supraclavicular, axillary, or inguinal lymphadenopathy.  RESP: Lungs clear to auscultation bilaterally without rales, rhonchi or wheezes.  CARDIOVASCULAR: Regular rate and rhythm. Normal S1, S2; no S3 or S4. No murmur, gallop, or rub.  ABDOMEN: Soft, nontender. Bowel sounds normal. No palpable organomegaly or masses.  MUSCULOSKELETAL: Extremities without gross deformities noted. No edema of bilateral lower extremities.  SKIN: No suspicious lesions or rashes.  NEURO: Alert and oriented x 3.  Cranial nerves II-XII grossly intact.  PSYCHIATRIC: Mentation and affect appear normal.    Laboratory/Imaging Studies:  No visits with results within 2 Week(s) from this visit.  Latest known visit with results is:    Infusion Therapy Visit on 09/20/2018   Component Date Value Ref Range Status     WBC 09/20/2018 11.7* 4.0 - 11.0 10e9/L Final     RBC Count 09/20/2018 4.80  3.8 - 5.2 10e12/L Final     Hemoglobin 09/20/2018 15.6  11.7 - 15.7 g/dL Final     Hematocrit 09/20/2018 46.6  35.0 - 47.0 % Final     MCV 09/20/2018 97  78 - 100 fl Final     MCH 09/20/2018 32.5  26.5 - 33.0 pg Final     MCHC 09/20/2018 33.5  31.5 - 36.5 g/dL Final     RDW 09/20/2018 12.4  10.0 - 15.0 % Final     Platelet Count 09/20/2018 198  150 - 450 10e9/L Final     Diff Method 09/20/2018 Automated Method   Final     % Neutrophils 09/20/2018 71.2  % Final     % Lymphocytes 09/20/2018 22.3  % Final     % Monocytes 09/20/2018 5.7  % Final     % Eosinophils 09/20/2018 0.2  % Final     % Basophils 09/20/2018 0.3  % Final     % Immature Granulocytes 09/20/2018 0.3  % Final     Nucleated RBCs 09/20/2018 0  0 /100 Final     Absolute Neutrophil 09/20/2018 8.3  1.6 - 8.3 10e9/L Final     Absolute Lymphocytes 09/20/2018 2.6  0.8 - 5.3 10e9/L Final     Absolute Monocytes 09/20/2018 0.7  0.0 - 1.3 10e9/L Final     Absolute Eosinophils 09/20/2018 0.0  0.0 - 0.7 10e9/L Final     Absolute Basophils 09/20/2018 0.0  0.0 - 0.2 10e9/L Final     Abs Immature Granulocytes 09/20/2018 0.0  0 - 0.4 10e9/L Final     Absolute Nucleated RBC 09/20/2018 0.0   Final     Sodium 09/20/2018 139  133 - 144 mmol/L Final     Potassium 09/20/2018 3.9  3.4 - 5.3 mmol/L Final     Chloride 09/20/2018 106  94 - 109 mmol/L Final     Carbon Dioxide 09/20/2018 28  20 - 32 mmol/L Final     Anion Gap 09/20/2018 5  3 - 14 mmol/L Final     Glucose 09/20/2018 73  70 - 99 mg/dL Final     Urea Nitrogen 09/20/2018 14  7 - 30 mg/dL Final     Creatinine 09/20/2018 0.67  0.52 - 1.04 mg/dL Final      GFR Estimate 09/20/2018 88  >60 mL/min/1.7m2 Final    Non  GFR Calc     GFR Estimate If Black 09/20/2018 >90  >60 mL/min/1.7m2 Final    African American GFR Calc     Calcium 09/20/2018 9.8  8.5 - 10.1 mg/dL Final     Bilirubin Total 09/20/2018 0.6  0.2 - 1.3 mg/dL Final     Albumin 09/20/2018 3.7  3.4 - 5.0 g/dL Final     Protein Total 09/20/2018 7.2  6.8 - 8.8 g/dL Final     Alkaline Phosphatase 09/20/2018 101  40 - 150 U/L Final     ALT 09/20/2018 12  0 - 50 U/L Final     AST 09/20/2018 11  0 - 45 U/L Final        Recent Results (from the past 744 hour(s))   XR Surgery VISH Fluoro L/T 5 Min w Stills    Narrative    SURGERY C-ARM FLUOROSCOPY LESS THAN FIVE MINUTES WITH STILLS   9/18/2018 12:53 PM     COMPARISON:  PET/CT 8/29/2018.    HISTORY: Port-A-Cath.     NUMBER OF IMAGES ACQUIRED: 2    VIEWS: 2    FLUOROSCOPY TIME: 0.13 minutes.      Impression    IMPRESSION: Intraoperative images demonstrate left IJ Port-A-Cath with  tip projecting over the mid SVC. Please see operative report for  further details.    MARISSA PITT MD   XR Chest Port 1 View    Narrative    XR CHEST PORT 1 VW 9/18/2018 1:17 PM    HISTORY: PowerPort placement.    COMPARISON: None.      Impression    IMPRESSION: A single view of the chest shows a left-sided power port  in place with the tip in the expected region of the mid SVC. No acute  cardiopulmonary disease is demonstrated.     MARSHA VICENTE MD       Assessment and plan:    (C21.1) Malignant neoplasm of anal canal (H)  (primary encounter diagnosis)  Patient will continue his chemoradiation.  She has been tolerating chemo very well without significant side effects.  We will continue with the current plan.  I will see the patient again in 2 weeks sooner if there are new developments or concerns.    The patient is ready to learn, no apparent learning barriers were identified.  Diagnosis and treatment plans were explained to the patient. The patient expressed understanding of the  content. The patient asked appropriate questions. The patient questions were answered to her satisfaction.    Time spent 25 minutes more than 50% of the time in counseling and coordination of care including discussion of potential side effects of chemotherapy, symptom management, follow-up and prognosis.    Chart documentation with Dragon Voice recognition Software. Although reviewed after completion, some words and grammatical errors may remain.    Again, thank you for allowing me to participate in the care of your patient.        Sincerely,        Ced Gunn MD

## 2018-10-10 NOTE — NURSING NOTE
"Oncology Rooming Note    October 10, 2018 11:33 AM   Jenise Calix is a 68 year old female who presents for:    Chief Complaint   Patient presents with     Oncology Clinic Visit     f/up Malignant neoplasm of anal canal     Initial Vitals: /51 (BP Location: Right arm, Patient Position: Sitting, Cuff Size: Adult Regular)  Pulse 71  Temp 97.7  F (36.5  C) (Tympanic)  Resp 20  Wt 162 lb (73.5 kg)  SpO2 98%  BMI 25.37 kg/m2 Estimated body mass index is 25.37 kg/(m^2) as calculated from the following:    Height as of 9/18/18: 5' 7\" (1.702 m).    Weight as of this encounter: 162 lb (73.5 kg). Body surface area is 1.86 meters squared.  Mild Pain (3) Comment: Data Unavailable   No LMP recorded. Patient is postmenopausal.  Allergies reviewed: Yes  Medications reviewed: Yes    Medications: Medication refills not needed today.  Pharmacy name entered into EPIC: [id number] - [facility] [address]    Clinical concerns: f/up Malignant neoplasm of anal canal    7 minutes for nursing intake (face to face time)     Nasrin Gardner LPN              "

## 2018-10-11 ENCOUNTER — APPOINTMENT (OUTPATIENT)
Dept: RADIATION THERAPY | Facility: OUTPATIENT CENTER | Age: 68
End: 2018-10-11
Payer: COMMERCIAL

## 2018-10-12 ENCOUNTER — APPOINTMENT (OUTPATIENT)
Dept: RADIATION THERAPY | Facility: OUTPATIENT CENTER | Age: 68
End: 2018-10-12
Payer: COMMERCIAL

## 2018-10-15 ENCOUNTER — APPOINTMENT (OUTPATIENT)
Dept: RADIATION THERAPY | Facility: OUTPATIENT CENTER | Age: 68
End: 2018-10-15
Payer: COMMERCIAL

## 2018-10-16 ENCOUNTER — APPOINTMENT (OUTPATIENT)
Dept: RADIATION THERAPY | Facility: OUTPATIENT CENTER | Age: 68
End: 2018-10-16
Payer: COMMERCIAL

## 2018-10-17 ENCOUNTER — APPOINTMENT (OUTPATIENT)
Dept: RADIATION THERAPY | Facility: OUTPATIENT CENTER | Age: 68
End: 2018-10-17
Payer: COMMERCIAL

## 2018-10-17 ENCOUNTER — OFFICE VISIT (OUTPATIENT)
Dept: RADIATION THERAPY | Facility: OUTPATIENT CENTER | Age: 68
End: 2018-10-17
Payer: COMMERCIAL

## 2018-10-17 VITALS
HEART RATE: 69 BPM | BODY MASS INDEX: 25.25 KG/M2 | SYSTOLIC BLOOD PRESSURE: 115 MMHG | WEIGHT: 161.2 LBS | DIASTOLIC BLOOD PRESSURE: 78 MMHG

## 2018-10-17 DIAGNOSIS — C21.1 MALIGNANT NEOPLASM OF ANAL CANAL (H): Primary | ICD-10-CM

## 2018-10-17 RX ORDER — MORPHINE SULFATE 15 MG/1
15 TABLET, FILM COATED, EXTENDED RELEASE ORAL EVERY 12 HOURS
Qty: 60 TABLET | Refills: 0 | Status: ON HOLD | OUTPATIENT
Start: 2018-10-17 | End: 2018-11-02

## 2018-10-17 NOTE — LETTER
10/17/2018      RE: Jenise Calix  [address] [place] [address]       [facility]  SPECIAL[facility]ING IN BREAKTHROUGHS  Radiation Oncology    On Treatment Visit Note      Jenise Calix      Date: 10/17/2018   MRN: [id number]   : 1950         Reason for Visit:  On Radiation Treatment Visit     Treatment Summary to Date   Anal canal/ Pelvic + Inguinal nodes  3600 cGy/ 5400 cGy            Subjective:   More fatigued. Worsening mild dimitris-anal skin pain discomfort. Mild  bother. No GI bother. Using silvadene and viscous topical lidocaine for skin care. Using rinsing bottle for dimitris-anal area. Oxycodone 5mg PRN for pain, using q4 hours. Second cycle of 5FU/MMC scheduled for tomorrow.     Nursing ROS:           Objective:   There were no vitals taken for this visit.  No apparent distress.   Skin - Moist desquamation in dimitris-anal area and vaginal introitus.   Anal canal - protruding mass significantly decreased since starting RT.     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  All questions and concerns addressed.    Plan:   1. Continue current therapy.    2. Continue aquaphor application to skin. Hydrocortisone PRN pruritus. Continue silvadene and topical lidocaine for dimitris-anal area and vaginal  area with skin breakdown. Continue oxycodone 5mg PRN. Will add long acting morphine 15 mg SR.   3. Imodium PRN loose stool.       Mosaiq chart and setup information reviewed  Ports checked      Edmundo Iqbal MD

## 2018-10-17 NOTE — PROGRESS NOTES
[facility]  SPECIALIZING IN BREAKTHROUGHS  Radiation Oncology    On Treatment Visit Note      Jenise Calix      Date: 10/17/2018   MRN: [id number]   : 1950         Reason for Visit:  On Radiation Treatment Visit     Treatment Summary to Date   Anal canal/ Pelvic + Inguinal nodes  3600 cGy/ 5400 cGy            Subjective:   More fatigued. Worsening mild dimitris-anal skin pain discomfort. Mild  bother. No GI bother. Using silvadene and viscous topical lidocaine for skin care. Using rinsing bottle for dimitris-anal area. Oxycodone 5mg PRN for pain, using q4 hours. Second cycle of 5FU/MMC scheduled for tomorrow.     Nursing ROS:           Objective:   There were no vitals taken for this visit.  No apparent distress.   Skin - Moist desquamation in dimitris-anal area and vaginal introitus.   Anal canal - protruding mass significantly decreased since starting RT.     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  All questions and concerns addressed.    Plan:   1. Continue current therapy.    2. Continue aquaphor application to skin. Hydrocortisone PRN pruritus. Continue silvadene and topical lidocaine for dimitris-anal area and vaginal area with skin breakdown. Continue oxycodone 5mg PRN. Will add long acting morphine  15 mg SR.   3. Imodium PRN loose stool.       Mosaiq chart and setup information reviewed  Ports checked      Edmundo Iqbal MD

## 2018-10-17 NOTE — MR AVS SNAPSHOT
After Visit Summary   10/17/2018    Jenise Calix    MRN: [id number]           Patient Information     Date Of Birth          1950        Visit Information        Provider Department      10/17/2018 10:45 AM Edmundo Iqbal MD [facility]        Today's Diagnoses     Malignant neoplasm of anal canal (H)    -  1       Follow-ups after your visit        Your next 10 appointments already scheduled     Oct 18, 2018  9:15 AM CDT   Linear Accelerator with Lr p Rad Tech   [facility] ([facility])    [address], [address]  [place] [address]   [phone]            Oct 18, 2018 10:00 AM CDT   Level 3 with ROOM 7 [facility] ([facility])    [facility]  [address]  [facility] [address]   [phone]            Oct 19, 2018 10:30 AM CDT   Linear Accelerator with Lr [facility] Rad Tech   [facility] ([facility])    [address], [address]  [place] [address]   [phone]            Oct 22, 2018 11:30 AM CDT   Linear Accelerator with Lr p Rad Tech   [facility] ([facility])    [address], [address]  [place] [address]   [phone]            Oct 22, 2018 12:00 PM CDT   Level O with ROOM 4 [facility] ([facility])    [facility]  [address]  [place] [address]   [phone]            Oct 23, 2018 10:30 AM CDT   Linear Accelerator with Lr p Rad Tech   [facility] ([facility])    [address], [address]  [place] [address]   [phone]            Oct 24, 2018 10:30 AM CDT   Linear Accelerator with Lr p Rad Tech   [facility] ([facility])    [address], [address]  [place] [address]   [phone]            Oct 24, 2018 10:45 AM CDT   ON TREATMENT VISIT with Mikey Allison  MD [place]   [facility] ([facility])    [address]  [facility] [address]   [phone]            Oct 25, 2018 10:30 AM CDT   Linear Accelerator with Lr [facility] Rad Tech   [facility] ([facility])    [address], [address]  [facility] [address]   [phone]            Oct 25, 2018 11:00 AM CDT   Return Visit with Ced Gunn MD   [facility] ([facility])    [facility]  [address]  [facility] [address]   [phone]              Who to contact     Please call your clinic at [phone] to:    Ask questions about your health    Make or cancel appointments    Discuss your medicines    Learn about your test results    Speak to your doctor            Additional Information About Your Visit        Care EveryWhere ID     This is your Care EveryWhere ID. This could be used by other organizations to access your [place] medical records  [id number]        Your Vitals Were     Pulse BMI (Body Mass Index)                69 25.25 kg/m2           Blood Pressure from Last 3 Encounters:   10/17/18 115/78   10/10/18 124/51   10/10/18 119/78    Weight from Last 3 Encounters:   10/17/18 73.1 kg (161 lb 3.2 oz)   10/10/18 73.5 kg (162 lb)   10/10/18 73.5 kg (162 lb)              Today, you had the following     No orders found for display         Today's Medication Changes          These changes are accurate as of 10/17/18 11:33 AM.  If you have any questions, ask your nurse or doctor.               Start taking these medicines.        Dose/Directions    morphine 15 MG 12 hr tablet   Commonly known as:  MS CONTIN   Used for:  Malignant neoplasm of anal canal (H)   Started by:  Edmundo Iqbal MD        Dose:  15 mg   Take 1 tablet (15 mg) by mouth every 12 hours maximum 2 tablet(s) per day   Quantity:  60 tablet   Refills:  0            Where to get your medicines      Some of these will  need a paper prescription and others can be bought over the counter.  Ask your nurse if you have questions.     Bring a paper prescription for each of these medications     morphine 15 MG 12 hr tablet               Information about OPIOIDS     PRESCRIPTION OPIOIDS: WHAT YOU NEED TO KNOW   We gave you an opioid (narcotic) pain medicine. It is important to manage your pain, but opioids are not always the best choice. You should first try all the other options your care team gave you. Take this medicine for as short a time (and as few doses) as possible.    Some activities can increase your pain, such as bandage changes or therapy sessions. It may help to take your pain medicine 30 to 60 minutes before these activities. Reduce your stress by getting enough sleep, working on hobbies you enjoy and practicing relaxation or meditation. Talk to your care team about ways to manage your pain beyond prescription opioids.    These medicines have risks:    DO NOT drive when on new or higher doses of pain medicine. These medicines can affect your alertness and reaction times, and you could be arrested for driving under the influence (DUI). If you need to use opioids long-term, talk to your care team about driving.    DO NOT operate heavy machinery    DO NOT do any other dangerous activities while taking these medicines.    DO NOT drink any alcohol while taking these medicines.     If the opioid prescribed includes acetaminophen, DO NOT take with any other medicines that contain acetaminophen. Read all labels carefully. Look for the word  acetaminophen  or  Tylenol.  Ask your pharmacist if you have questions or are unsure.    You can get addicted to pain medicines, especially if you have a history of addiction (chemical, alcohol or substance dependence). Talk to your care team about ways to reduce this risk.    All opioids tend to cause constipation. Drink plenty of water and eat foods that have a lot of fiber, such as fruits,  vegetables, prune juice, apple juice and high-fiber cereal. Take a laxative (Miralax, milk of magnesia, Colace, Senna) if you don t move your bowels at least every other day. Other side effects include upset stomach, sleepiness, dizziness, throwing up, tolerance (needing more of the medicine to have the same effect), physical dependence and slowed breathing.    Store your pills in a secure place, locked if possible. We will not replace any lost or stolen medicine. If you don t finish your medicine, please throw away (dispose) as directed by your pharmacist. The [place] [employer]ution [employer] has more information about safe disposal: [email or web address]         Primary Care Provider Fax #    Physician No Ref-Primary [phone]       No address on file        Equal Access to Services     AGUSTIN HAMILTON : Radha Brown, denise mon, yolanda moore, katina whitaker . So [facility] [phone].    ATENCIÓN: Si habla español, tiene a sanchez disposición servicios gratuitos de asistencia lingüística. Chalino al [phone].    We comply with applicable federal civil rights laws and [place] laws. We do not discriminate on the basis of race, color, national origin, age, disability, sex, sexual orientation, or gender identity.            Thank you!     Thank you for choosing [facility]  for your care. Our goal is always to provide you with excellent care. Hearing back from our patients is one way we can continue to improve our services. Please take a few minutes to complete the written survey that you may receive in the mail after your visit with us. Thank you!             Your Updated Medication List - Protect others around you: Learn how to safely use, store and throw away your medicines at www.disposemymeds.org.          This list is accurate as of 10/17/18 11:33 AM.  Always use your most recent med list.                    Brand Name Dispense Instructions for use Diagnosis    aspirin 81 MG tablet      Take 81 mg by mouth        HYDROcodone-acetaminophen 5-325 MG per tablet    NORCO    20 tablet    Take 1-2 tablets by mouth every 6 hours as needed for pain    Post-op pain       * lidocaine (viscous) 2 % solution    XYLOCAINE    100 mL    Take 15 mLs by mouth every 2 hours as needed for moderate pain swish and spit every 3-8 hours as needed; max 8 doses/24 hour period    Mucositis due to chemotherapy       * lidocaine (viscous) 2 % solution    XYLOCAINE    100 mL    Apply 10 mLs topically every 4 hours as needed for moderate pain Apply to dimitris-anal area every 4 hours as needed for pain.    Malignant neoplasm of anal canal (H)       lidocaine 2 % topical gel    XYLOCAINE          morphine 15 MG 12 hr tablet    MS CONTIN    60 tablet    Take 1 tablet (15 mg) by mouth every 12 hours maximum 2 tablet(s) per day    Malignant neoplasm of anal canal (H)       oxyCODONE 5 MG capsule    OXY-IR    60 capsule    Take 1 capsule (5 mg) by mouth every 4 hours as needed for severe pain    Malignant neoplasm of anal canal (H)       prochlorperazine 10 MG tablet    COMPAZINE    30 tablet    Take 1 tablet (10 mg) by mouth every 6 hours as needed (Nausea/Vomiting)    Malignant neoplasm of anal canal (H)       silver sulfADIAZINE 1 % cream    SILVADENE    25 g    Apply topically 2 times daily    Malignant neoplasm of anal canal (H)       * Notice:  This list has 2 medication(s) that are the same as other medications prescribed for you. Read the directions carefully, and ask your doctor or other care provider to review them with you.

## 2018-10-18 ENCOUNTER — INFUSION THERAPY VISIT (OUTPATIENT)
Dept: INFUSION THERAPY | Facility: CLINIC | Age: 68
End: 2018-10-18
Attending: INTERNAL MEDICINE
Payer: MEDICARE

## 2018-10-18 ENCOUNTER — APPOINTMENT (OUTPATIENT)
Dept: RADIATION THERAPY | Facility: OUTPATIENT CENTER | Age: 68
End: 2018-10-18
Payer: COMMERCIAL

## 2018-10-18 ENCOUNTER — HOSPITAL ENCOUNTER (OUTPATIENT)
Facility: CLINIC | Age: 68
Discharge: HOME | End: 2018-10-18
Attending: INTERNAL MEDICINE | Admitting: INTERNAL MEDICINE
Payer: MEDICARE

## 2018-10-18 VITALS — SYSTOLIC BLOOD PRESSURE: 129 MMHG | DIASTOLIC BLOOD PRESSURE: 61 MMHG | TEMPERATURE: 97.1 F | HEART RATE: 68 BPM

## 2018-10-18 DIAGNOSIS — C21.1 MALIGNANT NEOPLASM OF ANAL CANAL (H): Primary | ICD-10-CM

## 2018-10-18 LAB
ALBUMIN SERPL-MCNC: 2.9 G/DL (ref 3.4–5)
ALP SERPL-CCNC: 79 U/L (ref 40–150)
ALT SERPL W P-5'-P-CCNC: 18 U/L (ref 0–50)
ANION GAP SERPL CALCULATED.3IONS-SCNC: 6 MMOL/L (ref 3–14)
AST SERPL W P-5'-P-CCNC: 11 U/L (ref 0–45)
BASOPHILS # BLD AUTO: 0 10E9/L (ref 0–0.2)
BASOPHILS NFR BLD AUTO: 0.2 %
BILIRUB SERPL-MCNC: 0.4 MG/DL (ref 0.2–1.3)
BUN SERPL-MCNC: 10 MG/DL (ref 7–30)
CALCIUM SERPL-MCNC: 9.1 MG/DL (ref 8.5–10.1)
CHLORIDE SERPL-SCNC: 106 MMOL/L (ref 94–109)
CO2 SERPL-SCNC: 25 MMOL/L (ref 20–32)
CREAT SERPL-MCNC: 0.61 MG/DL (ref 0.52–1.04)
DIFFERENTIAL METHOD BLD: ABNORMAL
EOSINOPHIL # BLD AUTO: 0.1 10E9/L (ref 0–0.7)
EOSINOPHIL NFR BLD AUTO: 1.4 %
ERYTHROCYTE [DISTWIDTH] IN BLOOD BY AUTOMATED COUNT: 13.2 % (ref 10–15)
GFR SERPL CREATININE-BSD FRML MDRD: >90 ML/MIN/1.7M2
GLUCOSE SERPL-MCNC: 85 MG/DL (ref 70–99)
HCT VFR BLD AUTO: 35.8 % (ref 35–47)
HGB BLD-MCNC: 12.5 G/DL (ref 11.7–15.7)
IMM GRANULOCYTES # BLD: 0 10E9/L (ref 0–0.4)
IMM GRANULOCYTES NFR BLD: 0.3 %
LYMPHOCYTES # BLD AUTO: 0.3 10E9/L (ref 0.8–5.3)
LYMPHOCYTES NFR BLD AUTO: 4.6 %
MCH RBC QN AUTO: 33.7 PG (ref 26.5–33)
MCHC RBC AUTO-ENTMCNC: 34.9 G/DL (ref 31.5–36.5)
MCV RBC AUTO: 97 FL (ref 78–100)
MONOCYTES # BLD AUTO: 0.5 10E9/L (ref 0–1.3)
MONOCYTES NFR BLD AUTO: 7.8 %
NEUTROPHILS # BLD AUTO: 5.6 10E9/L (ref 1.6–8.3)
NEUTROPHILS NFR BLD AUTO: 85.7 %
NRBC # BLD AUTO: 0 10*3/UL
NRBC BLD AUTO-RTO: 0 /100
PLATELET # BLD AUTO: 174 10E9/L (ref 150–450)
POTASSIUM SERPL-SCNC: 4.1 MMOL/L (ref 3.4–5.3)
PROT SERPL-MCNC: 6.2 G/DL (ref 6.8–8.8)
RBC # BLD AUTO: 3.71 10E12/L (ref 3.8–5.2)
SODIUM SERPL-SCNC: 137 MMOL/L (ref 133–144)
WBC # BLD AUTO: 6.5 10E9/L (ref 4–11)

## 2018-10-18 PROCEDURE — G0498 CHEMO EXTEND IV INFUS W/PUMP: HCPCS

## 2018-10-18 PROCEDURE — 80053 COMPREHEN METABOLIC PANEL: CPT | Performed by: INTERNAL MEDICINE

## 2018-10-18 PROCEDURE — 25000128 H RX IP 250 OP 636: Performed by: INTERNAL MEDICINE

## 2018-10-18 PROCEDURE — 96375 TX/PRO/DX INJ NEW DRUG ADDON: CPT

## 2018-10-18 PROCEDURE — 85025 COMPLETE CBC W/AUTO DIFF WBC: CPT | Performed by: INTERNAL MEDICINE

## 2018-10-18 PROCEDURE — 96409 CHEMO IV PUSH SNGL DRUG: CPT

## 2018-10-18 RX ORDER — MITOMYCIN 5 MG/10ML
10 INJECTION, POWDER, LYOPHILIZED, FOR SOLUTION INTRAVENOUS ONCE
Status: COMPLETED | OUTPATIENT
Start: 2018-10-18 | End: 2018-10-18

## 2018-10-18 RX ADMIN — FAMOTIDINE 20 MG: 20 INJECTION, SOLUTION INTRAVENOUS at 11:14

## 2018-10-18 RX ADMIN — SODIUM CHLORIDE 250 ML: 9 INJECTION, SOLUTION INTRAVENOUS at 10:55

## 2018-10-18 RX ADMIN — DEXAMETHASONE SODIUM PHOSPHATE 12 MG: 10 INJECTION, SOLUTION INTRAMUSCULAR; INTRAVENOUS at 11:01

## 2018-10-18 RX ADMIN — MITOMYCIN 19 MG: 20 INJECTION, POWDER, LYOPHILIZED, FOR SOLUTION INTRAVENOUS at 13:15

## 2018-10-18 NOTE — PROGRESS NOTES
"Infusion Nursing Note:  Jenise Calix presents today for C2D1 Mitomycin, 5FU pump.    Patient seen by provider today: No   present during visit today: Not Applicable.    Note: N/A.    Intravenous Access:  Implanted Port.    Treatment Conditions:  Results reviewed, labs MET treatment parameters, ok to proceed with treatment.      Post Infusion Assessment:  Patient tolerated infusion without incident.  Blood return noted pre and post infusion.  Blood return noted during administration of Mitomycin every 5 cc.  Site patent and intact, free from redness, edema or discomfort.  No evidence of extravasations.  Prior to discharge: Port is secured in place with tegaderm and flushed with 10cc NS with positive blood return noted.  Continuous home infusion CADD pump connected.    All connectors secured in place and clamps taped open.    Pump started, \"running\" noted on display (CADD): YES.  Patient instructed to call our clinic or [facility] with any questions or concerns at home.  Patient verbalized understanding.    Patient set up for pump disconnect at our clinic on 10/22/18 @ 1200.          Discharge Plan:   Patient discharged in stable condition accompanied by: friend.  Departure Mode: Ambulatory.    Alem Alcantara RN                        "

## 2018-10-18 NOTE — MR AVS SNAPSHOT
After Visit Summary   10/18/2018    Jenise Calix    MRN: [id number]           Patient Information     Date Of Birth          1950        Visit Information        Provider Department      10/18/2018 10:00 AM ROOM 7 [facility]        Today's Diagnoses     Malignant neoplasm of anal canal (H)    -  1       Follow-ups after your visit        Your next 10 appointments already scheduled     Oct 19, 2018 10:30 AM CDT   Linear Accelerator with Lr [facility] Rad Tech   [facility] ([facility])    [address]  [place] [address]   [phone]            Oct 22, 2018 11:30 AM CDT   Linear Accelerator with Lr [facility] Rad Tech   [facility] ([facility])    [address]  [place] [address]   [phone]            Oct 22, 2018 12:00 PM CDT   Level O with ROOM 4 [facility] ([facility])    [facility]  [address]  [place] [address]   [phone]            Oct 23, 2018 10:30 AM CDT   Linear Accelerator with Lr [facility] Rad Tech   [facility] ([facility])    [address]  [place] [address]   [phone]            Oct 24, 2018 10:30 AM CDT   Linear Accelerator with Lr [facility] Rad Tech   [facility] ([facility])    [address]  [place] [address]   [phone]            Oct 24, 2018 10:45 AM CDT   ON TREATMENT VISIT with Mikey Rodriges MD   [facility] ([facility])    [address]  [place] [address]   [phone]            Oct 25, 2018 10:30 AM CDT   Linear Accelerator with Lr [facility] Rad Tech   [facility] ([facility])    [address]  [place] [address]   [phone]            Oct 25, 2018 11:00 AM CDT   Return Visit with Ced Gunn MD   [facility]  ([facility])    [facility]  [address]  [facility] [address]   [phone]            Oct 26, 2018 10:30 AM CDT   Linear Accelerator with Lr [facility]   [facility] ([facility])    [address]  [place] [address]   [phone]            Oct 29, 2018 10:30 AM CDT   Linear Accelerator with Lr [facility]   [facility] ([facility])    [address]  [facility] [address]   [phone]              Who to contact     If you have questions or need follow up information about today's clinic visit or your schedule please contact [facility] directly at [phone].  Normal or non-critical lab and imaging results will be communicated to you by MyChart, letter or phone within 4 business days after the clinic has received the results. If you do not hear from us within 7 days, please contact the clinic through MyChart or phone. If you have a critical or abnormal lab result, we will notify you by phone as soon as possible.  Submit refill requests through [employer] or call your pharmacy and they will forward the refill request to us. Please allow 3 business days for your refill to be completed.          Additional Information About Your Visit        Care EveryWhere ID     This is your Care EveryWhere ID. This could be used by other organizations to access your [place] medical records  [id number]        Your Vitals Were     Pulse Temperature                68 97.1  F (36.2  C) (Oral)           Blood Pressure from Last 3 Encounters:   10/18/18 129/61   10/17/18 115/78   10/10/18 124/51    Weight from Last 3 Encounters:   10/17/18 73.1 kg (161 lb 3.2 oz)   10/10/18 73.5 kg (162 lb)   10/10/18 73.5 kg (162 lb)              We Performed the Following     CBC with platelets differential     Comprehensive metabolic panel        Primary Care Provider Fax #    Physician No  Ref-Primary [phone]       No address on file        Equal Access to Services     AGUSTIN ALFONSO : Hadii basilio mcqueen israel Brown, wajomarda larissaevansha, yolanda valdovinos markosashley, katina shieldsin hayaadonny burrowsmaryana davidrobynbrianne juárezrosydonny alcaraz. So [facility] [phone].    ATENCIÓN: Si habla español, tiene a sanchez disposición servicios gratuitos de asistencia lingüística. Llame al [phone].    We comply with applicable federal civil rights laws and [place] laws. We do not discriminate on the basis of race, color, national origin, age, disability, sex, sexual orientation, or gender identity.            Thank you!     Thank you for choosing [facility]  for your care. Our goal is always to provide you with excellent care. Hearing back from our patients is one way we can continue to improve our services. Please take a few minutes to complete the written survey that you may receive in the mail after your visit with us. Thank you!             Your Updated Medication List - Protect others around you: Learn how to safely use, store and throw away your medicines at www.disposemymeds.org.          This list is accurate as of 10/18/18  4:18 PM.  Always use your most recent med list.                   Brand Name Dispense Instructions for use Diagnosis    aspirin 81 MG tablet      Take 81 mg by mouth        HYDROcodone-acetaminophen 5-325 MG per tablet    NORCO    20 tablet    Take 1-2 tablets by mouth every 6 hours as needed for pain    Post-op pain       * lidocaine (viscous) 2 % solution    XYLOCAINE    100 mL    Take 15 mLs by mouth every 2 hours as needed for moderate pain swish and spit every 3-8 hours as needed; max 8 doses/24 hour period    Mucositis due to chemotherapy       * lidocaine (viscous) 2 % solution    XYLOCAINE    100 mL    Apply 10 mLs topically every 4 hours as needed for moderate pain Apply to dimitris-anal area every 4 hours as needed for pain.    Malignant neoplasm of anal canal (H)       lidocaine 2 % topical gel     XYLOCAINE          morphine 15 MG 12 hr tablet    MS CONTIN    60 tablet    Take 1 tablet (15 mg) by mouth every 12 hours maximum 2 tablet(s) per day    Malignant neoplasm of anal canal (H)       oxyCODONE 5 MG capsule    OXY-IR    60 capsule    Take 1 capsule (5 mg) by mouth every 4 hours as needed for severe pain    Malignant neoplasm of anal canal (H)       prochlorperazine 10 MG tablet    COMPAZINE    30 tablet    Take 1 tablet (10 mg) by mouth every 6 hours as needed (Nausea/Vomiting)    Malignant neoplasm of anal canal (H)       silver sulfADIAZINE 1 % cream    SILVADENE    25 g    Apply topically 2 times daily    Malignant neoplasm of anal canal (H)       * Notice:  This list has 2 medication(s) that are the same as other medications prescribed for you. Read the directions carefully, and ask your doctor or other care provider to review them with you.

## 2018-10-19 ENCOUNTER — APPOINTMENT (OUTPATIENT)
Dept: RADIATION THERAPY | Facility: OUTPATIENT CENTER | Age: 68
End: 2018-10-19
Payer: COMMERCIAL

## 2018-10-22 ENCOUNTER — INFUSION THERAPY VISIT (OUTPATIENT)
Dept: INFUSION THERAPY | Facility: CLINIC | Age: 68
End: 2018-10-22
Attending: INTERNAL MEDICINE
Payer: MEDICARE

## 2018-10-22 ENCOUNTER — APPOINTMENT (OUTPATIENT)
Dept: RADIATION THERAPY | Facility: OUTPATIENT CENTER | Age: 68
End: 2018-10-22
Payer: COMMERCIAL

## 2018-10-22 VITALS — SYSTOLIC BLOOD PRESSURE: 125 MMHG | DIASTOLIC BLOOD PRESSURE: 50 MMHG | HEART RATE: 76 BPM

## 2018-10-22 DIAGNOSIS — C21.1 MALIGNANT NEOPLASM OF ANAL CANAL (H): Primary | ICD-10-CM

## 2018-10-22 PROCEDURE — 25000128 H RX IP 250 OP 636: Performed by: INTERNAL MEDICINE

## 2018-10-22 RX ORDER — HEPARIN SODIUM (PORCINE) LOCK FLUSH IV SOLN 100 UNIT/ML 100 UNIT/ML
5 SOLUTION INTRAVENOUS
Status: DISCONTINUED | OUTPATIENT
Start: 2018-10-22 | End: 2018-10-22 | Stop reason: HOSPADM

## 2018-10-22 RX ADMIN — SODIUM CHLORIDE, PRESERVATIVE FREE 5 ML: 5 INJECTION INTRAVENOUS at 12:08

## 2018-10-22 NOTE — MR AVS SNAPSHOT
After Visit Summary   10/22/2018    Jenise Calix    MRN: [id number]           Patient Information     Date Of Birth          1950        Visit Information        Provider Department      10/22/2018 12:00 PM ROOM 4 [facility]        Today's Diagnoses     Malignant neoplasm of anal canal (H)    -  1       Follow-ups after your visit        Your next 10 appointments already scheduled     Oct 23, 2018 10:30 AM CDT   Linear Accelerator with Lr Ump Rad Tech   [facility] ([facility])    [address], [address]  [place] [address]   [phone]            Oct 24, 2018 10:30 AM CDT   Linear Accelerator with Lr Ump Rad Tech   [facility] ([facility]    [address], [address]  [place] [address]   [phone]            Oct 24, 2018 10:45 AM CDT   ON TREATMENT VISIT with Mikey Rodriges MD   [facility] ([facility])    [address], [address]  [facility] [place] [address]   [phone]            Oct 25, 2018 10:30 AM CDT   Linear Accelerator with Lr p Rad Tech   [facility] ([facility])    [address], [address]  [place] [address]   [phone]            Oct 25, 2018 11:00 AM CDT   Return Visit with Ced Gunn MD   [facility] ([facility])    [facility]  [address]  [facility] [place] [address]   [phone]            Oct 26, 2018 10:30 AM CDT   Linear Accelerator with Lr p Rad Tech   [facility] ([facility])    [address], [address]  [place] [address]   [phone]            Oct 29, 2018 10:30 AM CDT   Linear Accelerator with Lr Ump Rad Tech   [facility] ([facility])    [address], [address]  [place] [address]   [phone]            Oct 30, 2018 10:30 AM CDT   Linear Accelerator with Lr Ump Rad Tech    [facility] ([facility])    [address], [address]  [facility] [place] [address]   [phone]            Oct 31, 2018 10:30 AM CDT   Linear Accelerator with Lr [facility]   [facility] ([facility])    [address], [address]  [facility] [place] [address]   [phone]            Oct 31, 2018 10:45 AM CDT   ON TREATMENT VISIT with Edmundo Iqbal MD   [facility] ([facility])    [address], [address]  [facility] [place] [address]   [phone]              Who to contact     If you have questions or need follow up information about today's clinic visit or your schedule please contact [facility] directly at [phone].  Normal or non-critical lab and imaging results will be communicated to you by MyChart, letter or phone within 4 business days after the clinic has received the results. If you do not hear from us within 7 days, please contact the clinic through MyChart or phone. If you have a critical or abnormal lab result, we will notify you by phone as soon as possible.  Submit refill requests through [employer] or call your pharmacy and they will forward the refill request to us. Please allow 3 business days for your refill to be completed.          Additional Information About Your Visit        Care EveryWhere ID     This is your Care EveryWhere ID. This could be used by other organizations to access your [place] medical records  [id number]        Your Vitals Were     Pulse                   76            Blood Pressure from Last 3 Encounters:   10/22/18 125/50   10/18/18 129/61   10/17/18 115/78    Weight from Last 3 Encounters:   10/17/18 73.1 kg (161 lb 3.2 oz)   10/10/18 73.5 kg (162 lb)   10/10/18 73.5 kg (162 lb)              Today, you had the following     No orders found for display       Primary Care Provider Fax #    Physician No Ref-Primary [phone]       No address on file        Equal Access to  Services     [facility]: Hadii aad ku hadreillyguero Tiarrainder, waaxda luqadaha, qaybta kaalmaadam moore, katina whitaker . So [facility] [phone].    ATENCIÓN: Si geoffla bhupinder, tiene a sanchez disposición servicios gratuitos de asistencia lingüística. Llame al [phone].    We comply with applicable federal civil rights laws and [place] laws. We do not discriminate on the basis of race, color, national origin, age, disability, sex, sexual orientation, or gender identity.            Thank you!     Thank you for choosing [facility]  for your care. Our goal is always to provide you with excellent care. Hearing back from our patients is one way we can continue to improve our services. Please take a few minutes to complete the written survey that you may receive in the mail after your visit with us. Thank you!             Your Updated Medication List - Protect others around you: Learn how to safely use, store and throw away your medicines at www.disposemymeds.org.          This list is accurate as of 10/22/18 12:31 PM.  Always use your most recent med list.                   Brand Name Dispense Instructions for use Diagnosis    aspirin 81 MG tablet      Take 81 mg by mouth        HYDROcodone-acetaminophen 5-325 MG per tablet    NORCO    20 tablet    Take 1-2 tablets by mouth every 6 hours as needed for pain    Post-op pain       * lidocaine (viscous) 2 % solution    XYLOCAINE    100 mL    Take 15 mLs by mouth every 2 hours as needed for moderate pain swish and spit every 3-8 hours as needed; max 8 doses/24 hour period    Mucositis due to chemotherapy       * lidocaine (viscous) 2 % solution    XYLOCAINE    100 mL    Apply 10 mLs topically every 4 hours as needed for moderate pain Apply to dimitris-anal area every 4 hours as needed for pain.    Malignant neoplasm of anal canal (H)       lidocaine 2 % topical gel    XYLOCAINE          morphine 15 MG 12 hr tablet    MS CONTIN    60 tablet     Take 1 tablet (15 mg) by mouth every 12 hours maximum 2 tablet(s) per day    Malignant neoplasm of anal canal (H)       oxyCODONE 5 MG capsule    OXY-IR    60 capsule    Take 1 capsule (5 mg) by mouth every 4 hours as needed for severe pain    Malignant neoplasm of anal canal (H)       prochlorperazine 10 MG tablet    COMPAZINE    30 tablet    Take 1 tablet (10 mg) by mouth every 6 hours as needed (Nausea/Vomiting)    Malignant neoplasm of anal canal (H)       silver sulfADIAZINE 1 % cream    SILVADENE    25 g    Apply topically 2 times daily    Malignant neoplasm of anal canal (H)       * Notice:  This list has 2 medication(s) that are the same as other medications prescribed for you. Read the directions carefully, and ask your doctor or other care provider to review them with you.

## 2018-10-23 ENCOUNTER — APPOINTMENT (OUTPATIENT)
Dept: RADIATION THERAPY | Facility: OUTPATIENT CENTER | Age: 68
End: 2018-10-23
Payer: COMMERCIAL

## 2018-10-24 ENCOUNTER — TELEPHONE (OUTPATIENT)
Dept: RADIATION THERAPY | Facility: OUTPATIENT CENTER | Age: 68
End: 2018-10-24

## 2018-10-24 DIAGNOSIS — R19.7 VOMITING AND DIARRHEA: ICD-10-CM

## 2018-10-24 DIAGNOSIS — R19.7 DIARRHEA, UNSPECIFIED TYPE: ICD-10-CM

## 2018-10-24 DIAGNOSIS — R11.0 NAUSEA: Primary | ICD-10-CM

## 2018-10-24 DIAGNOSIS — R11.10 VOMITING AND DIARRHEA: ICD-10-CM

## 2018-10-24 RX ORDER — ONDANSETRON 4 MG/1
4 TABLET, FILM COATED ORAL EVERY 8 HOURS PRN
Qty: 18 TABLET | Refills: 1 | Status: SHIPPED | OUTPATIENT
Start: 2018-10-24

## 2018-10-24 NOTE — TELEPHONE ENCOUNTER
Called patient today. Informed by RN patient cancelled her radiation treatment due to diarrhea. 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   She reports that Monday 10/22/2018/ she had  pump d/c (Mitomycin, 5FU pump). Tuesday she started having symptoms of diarrhea, nausea and vomiting. She had several episodes of diarrhea overnight (more than 4) and again in the morning. She tried taking Imodium at night, but  could not hold it down . She attempted to take again this morning x1, but was not effective for diarrhea. She tried Compazine x 1 on Tuesday but states  it made her sick . She stopped taking the long acting pain medication as she  did not like how it made her feel . She continues to take oxycodone prn, but none in the past couple days due to nausea. She also has fatigue. Her skin continues to be irritated in the dimitris-anal area. States not applying lotion or silvadene due to  not staying on  with frequent bowel movements. She continues to have pain. No fevers or chills. Reports poor intake of fluids, but has friend bringing her popsicles, Gatorade. Urine is reported as small in amount and dark. Patient does not wish to come in today for assessment or IV fluids as lives 1.5 hours away. Would prefer to manage at home. Aware may need to go to ED if no resolution or worsening symptoms. Discussed with patient to increase anti-emetic to TID. Recommended increasing fluids to 64 oz, eating small frequent low fat bland meals as tolerated. Once nausea and vomiting under control she will take pain medication prn.  She plans to continue imodium prn if not coming in today.  Reinforced importance of skin care to treatment area. Discussed patient with Dr. Gunn in Medical Oncology.  Patient to be seen tomorrow in follow-up after radiation treatment appt. Recommended r/u c.diff, Zofran 4 mg q8hrs to alternate with  compazine, and iv fluids tomorrow. Orders placed and patient will be notified by JASMIN Potts FRANCA

## 2018-10-25 ENCOUNTER — ONCOLOGY VISIT (OUTPATIENT)
Dept: ONCOLOGY | Facility: CLINIC | Age: 68
End: 2018-10-25
Attending: INTERNAL MEDICINE
Payer: MEDICARE

## 2018-10-25 ENCOUNTER — APPOINTMENT (OUTPATIENT)
Dept: RADIATION THERAPY | Facility: OUTPATIENT CENTER | Age: 68
End: 2018-10-25
Payer: COMMERCIAL

## 2018-10-25 ENCOUNTER — HOSPITAL ENCOUNTER (INPATIENT)
Facility: CLINIC | Age: 68
LOS: 11 days | Discharge: HOME HEALTH | DRG: 393 | End: 2018-11-05
Attending: FAMILY MEDICINE | Admitting: FAMILY MEDICINE
Payer: MEDICARE

## 2018-10-25 ENCOUNTER — INFUSION THERAPY VISIT (OUTPATIENT)
Dept: INFUSION THERAPY | Facility: CLINIC | Age: 68
End: 2018-10-25
Attending: INTERNAL MEDICINE
Payer: COMMERCIAL

## 2018-10-25 VITALS
RESPIRATION RATE: 16 BRPM | HEART RATE: 106 BPM | HEIGHT: 67 IN | OXYGEN SATURATION: 98 % | TEMPERATURE: 98.5 F | BODY MASS INDEX: 24.03 KG/M2 | DIASTOLIC BLOOD PRESSURE: 68 MMHG | WEIGHT: 153.1 LBS | SYSTOLIC BLOOD PRESSURE: 110 MMHG

## 2018-10-25 DIAGNOSIS — R11.10 VOMITING AND DIARRHEA: ICD-10-CM

## 2018-10-25 DIAGNOSIS — C21.1 MALIGNANT NEOPLASM OF ANAL CANAL (H): Primary | ICD-10-CM

## 2018-10-25 DIAGNOSIS — R19.7 VOMITING AND DIARRHEA: Primary | ICD-10-CM

## 2018-10-25 DIAGNOSIS — I48.91 ATRIAL FIBRILLATION, UNSPECIFIED TYPE (H): ICD-10-CM

## 2018-10-25 DIAGNOSIS — R11.10 VOMITING AND DIARRHEA: Primary | ICD-10-CM

## 2018-10-25 DIAGNOSIS — C21.1 MALIGNANT NEOPLASM OF ANAL CANAL (H): ICD-10-CM

## 2018-10-25 DIAGNOSIS — T45.1X5A CHEMOTHERAPY INDUCED NAUSEA AND VOMITING: Primary | ICD-10-CM

## 2018-10-25 DIAGNOSIS — R11.2 CHEMOTHERAPY INDUCED NAUSEA AND VOMITING: Primary | ICD-10-CM

## 2018-10-25 DIAGNOSIS — R19.7 VOMITING AND DIARRHEA: ICD-10-CM

## 2018-10-25 DIAGNOSIS — T30.0 RADIATION BURN: ICD-10-CM

## 2018-10-25 DIAGNOSIS — Z72.0 NICOTINE ABUSE: ICD-10-CM

## 2018-10-25 DIAGNOSIS — I48.92 PAROXYSMAL ATRIAL FLUTTER (H): ICD-10-CM

## 2018-10-25 LAB
ALBUMIN SERPL-MCNC: 2.9 G/DL (ref 3.4–5)
ALBUMIN UR-MCNC: 100 MG/DL
ALP SERPL-CCNC: 86 U/L (ref 40–150)
ALT SERPL W P-5'-P-CCNC: 26 U/L (ref 0–50)
ANION GAP SERPL CALCULATED.3IONS-SCNC: 10 MMOL/L (ref 3–14)
APPEARANCE UR: ABNORMAL
AST SERPL W P-5'-P-CCNC: 14 U/L (ref 0–45)
BASOPHILS # BLD AUTO: 0 10E9/L (ref 0–0.2)
BASOPHILS NFR BLD AUTO: 0.8 %
BILIRUB SERPL-MCNC: 1.4 MG/DL (ref 0.2–1.3)
BILIRUB UR QL STRIP: NEGATIVE
BUN SERPL-MCNC: 13 MG/DL (ref 7–30)
CALCIUM SERPL-MCNC: 8.8 MG/DL (ref 8.5–10.1)
CHLORIDE SERPL-SCNC: 103 MMOL/L (ref 94–109)
CO2 SERPL-SCNC: 24 MMOL/L (ref 20–32)
COLOR UR AUTO: ABNORMAL
CREAT SERPL-MCNC: 0.52 MG/DL (ref 0.52–1.04)
CREAT SERPL-MCNC: 0.57 MG/DL (ref 0.52–1.04)
DIFFERENTIAL METHOD BLD: ABNORMAL
EOSINOPHIL # BLD AUTO: 0 10E9/L (ref 0–0.7)
EOSINOPHIL NFR BLD AUTO: 3.1 %
ERYTHROCYTE [DISTWIDTH] IN BLOOD BY AUTOMATED COUNT: 13.2 % (ref 10–15)
GFR SERPL CREATININE-BSD FRML MDRD: >90 ML/MIN/1.7M2
GFR SERPL CREATININE-BSD FRML MDRD: >90 ML/MIN/1.7M2
GLUCOSE SERPL-MCNC: 85 MG/DL (ref 70–99)
GLUCOSE UR STRIP-MCNC: NEGATIVE MG/DL
HCT VFR BLD AUTO: 34 % (ref 35–47)
HGB BLD-MCNC: 11.8 G/DL (ref 11.7–15.7)
HGB UR QL STRIP: ABNORMAL
IMM GRANULOCYTES # BLD: 0 10E9/L (ref 0–0.4)
IMM GRANULOCYTES NFR BLD: 0.8 %
KETONES UR STRIP-MCNC: 80 MG/DL
LEUKOCYTE ESTERASE UR QL STRIP: ABNORMAL
LYMPHOCYTES # BLD AUTO: 0.1 10E9/L (ref 0.8–5.3)
LYMPHOCYTES NFR BLD AUTO: 7.9 %
MCH RBC QN AUTO: 33.2 PG (ref 26.5–33)
MCHC RBC AUTO-ENTMCNC: 34.7 G/DL (ref 31.5–36.5)
MCV RBC AUTO: 96 FL (ref 78–100)
MONOCYTES # BLD AUTO: 0.1 10E9/L (ref 0–1.3)
MONOCYTES NFR BLD AUTO: 5.5 %
NEUTROPHILS # BLD AUTO: 1 10E9/L (ref 1.6–8.3)
NEUTROPHILS NFR BLD AUTO: 81.9 %
NITRATE UR QL: NEGATIVE
NRBC # BLD AUTO: 0 10*3/UL
NRBC BLD AUTO-RTO: 0 /100
PH UR STRIP: 5 PH (ref 5–7)
PLATELET # BLD AUTO: 100 10E9/L (ref 150–450)
PLATELET # BLD AUTO: 93 10E9/L (ref 150–450)
POTASSIUM SERPL-SCNC: 3.9 MMOL/L (ref 3.4–5.3)
PROT SERPL-MCNC: 6.6 G/DL (ref 6.8–8.8)
RBC # BLD AUTO: 3.55 10E12/L (ref 3.8–5.2)
SODIUM SERPL-SCNC: 137 MMOL/L (ref 133–144)
SOURCE: ABNORMAL
SP GR UR STRIP: 1.03 (ref 1–1.03)
UROBILINOGEN UR STRIP-MCNC: 4 MG/DL (ref 0–2)
WBC # BLD AUTO: 1.3 10E9/L (ref 4–11)

## 2018-10-25 PROCEDURE — 81003 URINALYSIS AUTO W/O SCOPE: CPT | Performed by: FAMILY MEDICINE

## 2018-10-25 PROCEDURE — 96361 HYDRATE IV INFUSION ADD-ON: CPT

## 2018-10-25 PROCEDURE — 80053 COMPREHEN METABOLIC PANEL: CPT | Performed by: NURSE PRACTITIONER

## 2018-10-25 PROCEDURE — 25000132 ZZH RX MED GY IP 250 OP 250 PS 637: Mod: GY | Performed by: FAMILY MEDICINE

## 2018-10-25 PROCEDURE — 85025 COMPLETE CBC W/AUTO DIFF WBC: CPT | Performed by: NURSE PRACTITIONER

## 2018-10-25 PROCEDURE — 12000007 ZZH R&B INTERMEDIATE

## 2018-10-25 PROCEDURE — 82565 ASSAY OF CREATININE: CPT | Performed by: FAMILY MEDICINE

## 2018-10-25 PROCEDURE — 99214 OFFICE O/P EST MOD 30 MIN: CPT | Performed by: INTERNAL MEDICINE

## 2018-10-25 PROCEDURE — 25000125 ZZHC RX 250: Performed by: FAMILY MEDICINE

## 2018-10-25 PROCEDURE — G0378 HOSPITAL OBSERVATION PER HR: HCPCS

## 2018-10-25 PROCEDURE — 25000128 H RX IP 250 OP 636: Performed by: INTERNAL MEDICINE

## 2018-10-25 PROCEDURE — 25000128 H RX IP 250 OP 636: Performed by: NURSE PRACTITIONER

## 2018-10-25 PROCEDURE — 33300001 ZZH OUT OF HOSPITAL SERVICE, RADIATION 333 OPNP

## 2018-10-25 PROCEDURE — A9270 NON-COVERED ITEM OR SERVICE: HCPCS | Mod: GY | Performed by: FAMILY MEDICINE

## 2018-10-25 PROCEDURE — 96374 THER/PROPH/DIAG INJ IV PUSH: CPT

## 2018-10-25 PROCEDURE — 25000128 H RX IP 250 OP 636: Performed by: FAMILY MEDICINE

## 2018-10-25 PROCEDURE — 85049 AUTOMATED PLATELET COUNT: CPT | Performed by: FAMILY MEDICINE

## 2018-10-25 PROCEDURE — 99223 1ST HOSP IP/OBS HIGH 75: CPT | Mod: AI | Performed by: FAMILY MEDICINE

## 2018-10-25 PROCEDURE — G0463 HOSPITAL OUTPT CLINIC VISIT: HCPCS | Mod: 25

## 2018-10-25 RX ORDER — METOCLOPRAMIDE 5 MG/1
5 TABLET ORAL EVERY 6 HOURS PRN
Status: DISCONTINUED | OUTPATIENT
Start: 2018-10-25 | End: 2018-11-05 | Stop reason: HOSPADM

## 2018-10-25 RX ORDER — ONDANSETRON 4 MG/1
4 TABLET, FILM COATED ORAL EVERY 8 HOURS PRN
Status: DISCONTINUED | OUTPATIENT
Start: 2018-10-25 | End: 2018-10-25

## 2018-10-25 RX ORDER — ASPIRIN 81 MG/1
81 TABLET, CHEWABLE ORAL DAILY
Status: DISCONTINUED | OUTPATIENT
Start: 2018-10-26 | End: 2018-10-28

## 2018-10-25 RX ORDER — OXYCODONE HYDROCHLORIDE 5 MG/1
10 TABLET ORAL EVERY 4 HOURS PRN
Status: DISCONTINUED | OUTPATIENT
Start: 2018-10-25 | End: 2018-11-05 | Stop reason: HOSPADM

## 2018-10-25 RX ORDER — ACETAMINOPHEN 325 MG/1
650 TABLET ORAL EVERY 4 HOURS PRN
Status: DISCONTINUED | OUTPATIENT
Start: 2018-10-25 | End: 2018-11-04

## 2018-10-25 RX ORDER — NALOXONE HYDROCHLORIDE 0.4 MG/ML
.1-.4 INJECTION, SOLUTION INTRAMUSCULAR; INTRAVENOUS; SUBCUTANEOUS
Status: DISCONTINUED | OUTPATIENT
Start: 2018-10-25 | End: 2018-11-05 | Stop reason: HOSPADM

## 2018-10-25 RX ORDER — PROCHLORPERAZINE 25 MG
12.5 SUPPOSITORY, RECTAL RECTAL EVERY 12 HOURS PRN
Status: DISCONTINUED | OUTPATIENT
Start: 2018-10-25 | End: 2018-11-05 | Stop reason: HOSPADM

## 2018-10-25 RX ORDER — ASPIRIN 81 MG/1
81 TABLET ORAL ONCE
Status: COMPLETED | OUTPATIENT
Start: 2018-10-25 | End: 2018-10-25

## 2018-10-25 RX ORDER — ONDANSETRON 2 MG/ML
4 INJECTION INTRAMUSCULAR; INTRAVENOUS EVERY 6 HOURS PRN
Status: DISCONTINUED | OUTPATIENT
Start: 2018-10-25 | End: 2018-11-05 | Stop reason: HOSPADM

## 2018-10-25 RX ORDER — PROCHLORPERAZINE MALEATE 10 MG
10 TABLET ORAL EVERY 6 HOURS PRN
Status: DISCONTINUED | OUTPATIENT
Start: 2018-10-25 | End: 2018-10-25 | Stop reason: DRUGHIGH

## 2018-10-25 RX ORDER — HYDROMORPHONE HYDROCHLORIDE 1 MG/ML
.3-.5 INJECTION, SOLUTION INTRAMUSCULAR; INTRAVENOUS; SUBCUTANEOUS
Status: DISCONTINUED | OUTPATIENT
Start: 2018-10-25 | End: 2018-11-05 | Stop reason: HOSPADM

## 2018-10-25 RX ORDER — PROCHLORPERAZINE MALEATE 5 MG
5 TABLET ORAL EVERY 6 HOURS PRN
Status: DISCONTINUED | OUTPATIENT
Start: 2018-10-25 | End: 2018-11-05 | Stop reason: HOSPADM

## 2018-10-25 RX ORDER — METOCLOPRAMIDE HYDROCHLORIDE 5 MG/ML
5 INJECTION INTRAMUSCULAR; INTRAVENOUS EVERY 6 HOURS PRN
Status: DISCONTINUED | OUTPATIENT
Start: 2018-10-25 | End: 2018-11-05 | Stop reason: HOSPADM

## 2018-10-25 RX ORDER — SILVER SULFADIAZINE 10 MG/G
CREAM TOPICAL 2 TIMES DAILY
Status: DISCONTINUED | OUTPATIENT
Start: 2018-10-25 | End: 2018-10-29

## 2018-10-25 RX ORDER — SODIUM CHLORIDE 9 MG/ML
INJECTION, SOLUTION INTRAVENOUS CONTINUOUS
Status: DISCONTINUED | OUTPATIENT
Start: 2018-10-25 | End: 2018-11-01

## 2018-10-25 RX ORDER — ONDANSETRON 4 MG/1
4 TABLET, ORALLY DISINTEGRATING ORAL EVERY 6 HOURS PRN
Status: DISCONTINUED | OUTPATIENT
Start: 2018-10-25 | End: 2018-11-05 | Stop reason: HOSPADM

## 2018-10-25 RX ADMIN — DEXAMETHASONE SODIUM PHOSPHATE: 10 INJECTION, SOLUTION INTRAMUSCULAR; INTRAVENOUS at 11:55

## 2018-10-25 RX ADMIN — SILVER SULFADIAZINE: 10 CREAM TOPICAL at 19:18

## 2018-10-25 RX ADMIN — Medication 0.3 MG: at 19:32

## 2018-10-25 RX ADMIN — SODIUM CHLORIDE, PRESERVATIVE FREE: 5 INJECTION INTRAVENOUS at 16:22

## 2018-10-25 RX ADMIN — HYDROMORPHONE HYDROCHLORIDE 1 MG: 1 INJECTION, SOLUTION INTRAMUSCULAR; INTRAVENOUS; SUBCUTANEOUS at 12:43

## 2018-10-25 RX ADMIN — ASPIRIN 81 MG: 81 TABLET, COATED ORAL at 21:05

## 2018-10-25 RX ADMIN — SODIUM CHLORIDE 1000 ML: 9 INJECTION, SOLUTION INTRAVENOUS at 11:55

## 2018-10-25 ASSESSMENT — ACTIVITIES OF DAILY LIVING (ADL)
SWALLOWING: 0 - SWALLOWS FOODS/LIQUIDS WITHOUT DIFFICULTY
EATING: 0 - INDEPENDENT
COMMUNICATION: 0 - UNDERSTANDS/COMMUNICATES WITHOUT DIFFICULTY
TOILETING: 2 - ASSISTIVE PERSON
WHICH_OF_THE_ABOVE_FUNCTIONAL_RISKS_HAD_A_RECENT_ONSET_OR_CHANGE?: TOILETING
TRANSFERRING: 2 - ASSISTIVE PERSON
BATHING: 0 - INDEPENDENT
AMBULATION: 2 - ASSISTIVE PERSON
ADLS_ACUITY_SCORE: 10
DRESS: 0 - INDEPENDENT
ADLS_ACUITY_SCORE: 10

## 2018-10-25 ASSESSMENT — PAIN SCALES - GENERAL: PAINLEVEL: EXTREME PAIN (8)

## 2018-10-25 NOTE — MR AVS SNAPSHOT
After Visit Summary   10/25/2018    Jenise Calix    MRN: [id number]           Patient Information     Date Of Birth          1950        Visit Information        Provider Department      10/25/2018 1:30 PM ROOM 1 [facility]        Today's Diagnoses     Vomiting and diarrhea    -  1    Malignant neoplasm of anal canal (H)           Follow-ups after your visit        Your next 10 appointments already scheduled     Oct 26, 2018 10:30 AM CDT   Linear Accelerator with Lr RUST Rad Tech   [facility] ([facility])    [address], [address]  [facility] [place] [address]   [phone]            Oct 26, 2018 10:45 AM CDT   ON TREATMENT VISIT with Edmundo Iqbal MD   [facility] ([facility])    [address], [address]  [facility] [place] [address]   [phone]            Oct 29, 2018 10:30 AM CDT   Linear Accelerator with Lr RUST Rad Tech   [facility] ([facility])    [address], [address]  [facility] [place] [address]   [phone]            Oct 30, 2018 10:30 AM CDT   Linear Accelerator with Lr RUST Rad Tech   [facility] ([facility])    [address]  [place] [address]   [phone]            Oct 31, 2018 10:30 AM CDT   Linear Accelerator with Lr RUST Rad Tech   [facility] ([facility])    [address]  [facility] [place] [address]   [phone]            Oct 31, 2018 10:45 AM CDT   ON TREATMENT VISIT with Edmundo Iqbal MD   [facility] ([facility])    [address]  [facility] [place] [address]   [phone]            Nov 01, 2018 12:00 PM CDT   Linear Accelerator with Lr RUST Rad [facility]   [facility] ([facility])    [address]  [facility] [place] [address]   [phone]              Future tests that were ordered for you today     Open Future Orders         Priority Expected Expires Ordered    Clostridium difficile toxin B PCR Routine 10/25/2018 12/23/2018 10/24/2018            Who to contact     If you have questions or need follow up information about today's clinic visit or your schedule please contact [facility] directly at [phone].  Normal or non-critical lab and imaging results will be communicated to you by MyChart, letter or phone within 4 business days after the clinic has received the results. If you do not hear from us within 7 days, please contact the clinic through MyChart or phone. If you have a critical or abnormal lab result, we will notify you by phone as soon as possible.  Submit refill requests through [employer] or call your pharmacy and they will forward the refill request to us. Please allow 3 business days for your refill to be completed.          Additional Information About Your Visit        Care EveryWhere ID     This is your Care EveryWhere ID. This could be used by other organizations to access your [place] medical records  [id number]         Blood Pressure from Last 3 Encounters:   10/25/18 128/50   10/25/18 110/68   10/22/18 125/50    Weight from Last 3 Encounters:   10/25/18 71.2 kg (156 lb 15.5 oz)   10/25/18 69.4 kg (153 lb 1.6 oz)   10/17/18 73.1 kg (161 lb 3.2 oz)              We Performed the Following     CBC with platelets differential     Comprehensive metabolic panel        Primary Care Provider Fax #    Physician No Ref-Primary [phone]       No address on file        Equal Access to Services     AGUSTIN HAMILTON : Hadii basilio Brown, waaxda luqadaha, qaybta kaalmada lorenada, katina whitaker . So [facility] [phone].    ATENCIÓN: Si habla español, tiene a sanchez disposición servicios gratuitos de asistencia lingüística. Llame al [phone].    We comply with applicable federal civil rights laws and [place] laws. We do not discriminate on the basis of race, color, national  origin, age, disability, sex, sexual orientation, or gender identity.            Thank you!     Thank you for choosing [facility]  for your care. Our goal is always to provide you with excellent care. Hearing back from our patients is one way we can continue to improve our services. Please take a few minutes to complete the written survey that you may receive in the mail after your visit with us. Thank you!             Your Updated Medication List - Protect others around you: Learn how to safely use, store and throw away your medicines at www.disposemymeds.org.          This list is accurate as of 10/25/18  2:04 PM.  Always use your most recent med list.                   Brand Name Dispense Instructions for use Diagnosis    aspirin 81 MG tablet      Take 81 mg by mouth        HYDROcodone-acetaminophen 5-325 MG per tablet    NORCO    20 tablet    Take 1-2 tablets by mouth every 6 hours as needed for pain    Post-op pain       lidocaine (viscous) 2 % solution    XYLOCAINE    100 mL    Take 15 mLs by mouth every 2 hours as needed for moderate pain swish and spit every 3-8 hours as needed; max 8 doses/24 hour period    Mucositis due to chemotherapy       lidocaine 2 % topical gel    XYLOCAINE          morphine 15 MG 12 hr tablet    MS CONTIN    60 tablet    Take 1 tablet (15 mg) by mouth every 12 hours maximum 2 tablet(s) per day    Malignant neoplasm of anal canal (H)       ondansetron 4 MG tablet    ZOFRAN    18 tablet    Take 1 tablet (4 mg) by mouth every 8 hours as needed for nausea    Nausea       oxyCODONE 5 MG capsule    OXY-IR    60 capsule    Take 1 capsule (5 mg) by mouth every 4 hours as needed for severe pain    Malignant neoplasm of anal canal (H)       prochlorperazine 10 MG tablet    COMPAZINE    30 tablet    Take 1 tablet (10 mg) by mouth every 6 hours as needed (Nausea/Vomiting)    Malignant neoplasm of anal canal (H)       silver sulfADIAZINE 1 % cream    SILVADENE    25 g    Apply  topically 2 times daily    Malignant neoplasm of anal canal (H)

## 2018-10-25 NOTE — IP AVS SNAPSHOT
"    [facility] SURGICAL: [phone]                                              INTERAGENCY TRANSFER FORM - PHYSICIAN ORDERS   10/25/2018                    Hospital Admission Date: 10/25/2018  JANNIE MCINTYRE   : 1950  Sex: Female        Attending Provider: Bimal Haley MD     Allergies:  Metoprolol    Infection:  None   Service:  HOSPITALIST    Ht:  1.702 m (5' 7\")   Wt:  76.2 kg (167 lb 15.9 oz)   Admission Wt:  71.2 kg (156 lb 15.5 oz)    BMI:  26.31 kg/m 2   BSA:  1.9 m 2            Patient PCP Information     Provider PCP Type    Physician No Ref-Primary General      ED Clinical Impression     Diagnosis Description Comment Added By Time Added    Chemotherapy induced nausea and vomiting [R11.2, T45.1X5A] Chemotherapy induced nausea and vomiting [R11.2, T45.1X5A]  Aby Frederick RN 2018  1:23 PM    Malignant neoplasm of anal canal (H) [C21.1] Malignant neoplasm of anal canal (H) [C21.1]  Jaimie Harman, APRN CNP 2018  8:12 AM    Radiation burn [T30.0] Radiation burn [T30.0]  Jaimie Harman, APRN CNP 2018  8:13 AM    Paroxysmal atrial flutter (H) [I48.92] Paroxysmal atrial flutter (H) [I48.92]  Ander Mckinley MD 2018  3:14 PM      Hospital Problems as of 2018              Priority Class Noted POA    Chemotherapy induced nausea and vomiting Medium  10/25/2018 Yes      Non-Hospital Problems as of 2018              Priority Class Noted    Malignant neoplasm of anal canal (H) Medium  2018    Chronic atrial fibrillation (H) Medium  2018    Nicotine abuse Medium  2018    Atrial fibrillation (H) Medium  Unknown    Vomiting and diarrhea Medium  10/24/2018      Code Status History     Date Active Date Inactive Code Status Order ID Comments User Context    This patient has a current code status but no historical code status.         Medication Review      START taking        Dose / Directions Comments    acetaminophen 500 MG tablet "   Commonly known as:  TYLENOL   Used for:  Radiation burn        Dose:  1000 mg   Take 2 tablets (1,000 mg) by mouth 3 times daily   Refills:  0        diltiazem 180 MG 24 hr capsule   Used for:  Paroxysmal atrial flutter (H)        Dose:  180 mg   Take 1 capsule (180 mg) by mouth daily   Quantity:  30 capsule   Refills:  0        morphine 0.1% in solosite 0.1% topical gel   Used for:  Malignant neoplasm of anal canal (H), Radiation burn        Apply to rectal wound q4h PRN pain; to be mixed in Intrasite gel instead of Solosite.   Quantity:  150 g   Refills:  0        oxyCODONE IR 10 MG tablet   Commonly known as:  ROXICODONE   Used for:  Malignant neoplasm of anal canal (H), Radiation burn   Replaces:  oxyCODONE 5 MG capsule        Dose:  10 mg   Take 1 tablet (10 mg) by mouth every 4 hours as needed for moderate to severe pain   Quantity:  100 tablet   Refills:  0        polyethylene glycol Packet   Commonly known as:  MIRALAX/GLYCOLAX   Used for:  Malignant neoplasm of anal canal (H)        Dose:  17 g   Take 17 g by mouth daily as needed for constipation   Quantity:  7 packet   Refills:  0          CONTINUE these medications which have NOT CHANGED        Dose / Directions Comments    aspirin 81 MG tablet        Dose:  81 mg   Take 81 mg by mouth daily   Refills:  0        lidocaine (viscous) 2 % solution   Commonly known as:  XYLOCAINE   Used for:  Mucositis due to chemotherapy        Dose:  15 mL   Take 15 mLs by mouth every 2 hours as needed for moderate pain swish and spit every 3-8 hours as needed; max 8 doses/24 hour period   Quantity:  100 mL   Refills:  3        ondansetron 4 MG tablet   Commonly known as:  ZOFRAN   Used for:  Nausea        Dose:  4 mg   Take 1 tablet (4 mg) by mouth every 8 hours as needed for nausea   Quantity:  18 tablet   Refills:  1        prochlorperazine 10 MG tablet   Commonly known as:  COMPAZINE   Used for:  Malignant neoplasm of anal canal (H)        Dose:  10 mg   Take 1  tablet (10 mg) by mouth every 6 hours as needed (Nausea/Vomiting)   Quantity:  30 tablet   Refills:  1        silver sulfADIAZINE 1 % cream   Commonly known as:  SILVADENE   Used for:  Malignant neoplasm of anal canal (H)        Apply topically 2 times daily   Quantity:  25 g   Refills:  1          STOP taking     morphine 15 MG 12 hr tablet   Commonly known as:  MS CONTIN           oxyCODONE 5 MG capsule   Commonly known as:  OXY-IR   Replaced by:  oxyCODONE IR 10 MG tablet                     Further instructions from your care team       Patient has Morphine Gel that needs to be sent home with her in lock box.    If you are going to need a refill on your morphine gel, expect a 4 to 7 day delay after you drop the prescription off as [place].  Also check on the price--your insurance MAY NOT cover the cost.  Use it sparingly, and apply it ONLY around the opening to the vagina and rectum.  So request a refill EARLY.     If you don't have a SITZ bath at home, go to your local medical supply store and consider buying one.  Or you can purchase one off of [employer].          Home care nurse can check blood counts to assess when you can resume radiation: goal platelets >50 and ANC>1500    For the pain:   -use tylenol 1000 mg 3 times/day  -add oxycodone 5mg every 4 hrs as needed  -morphine gel with dressing changes.     For the paroxysmal atrial flutter  -diltiazem 180 mg daily to slow the rate when they occur  -would follow up with cardiology to consider ablation at some point    Home care to check CBC Wednesday--if platelets are 50,000 or greater--notify radiation oncology to restart radiation.   If platelets are less than 50,000--recheck every other day until they are greater than 50,000.  If ANC is less than 1.0-----call Dr Gunn's office. And please update Dr Iqbal-radiation at [phone] as well.   Fax results to [phone]        Referrals     Home Care  Referral       **Order classes of: [place] [facility], [facility] and [facility] will route to the Home Care and Hospice Referral Pool.  Home Care or Hospice will then contact the patient to schedule their appointment.**    If you do not hear from Home Care and Hospice, or you would like to call to schedule, please call the referring place of service that your provider has listed below.  ______________________________________________________________________    Your provider has referred you to: [facility] (Phone: [phone] Fax: [phone])    Extended Emergency Contact Information  Primary Emergency Contact: Chau Calix   [facility]  Home Phone: [phone]  Mobile Phone: [phone]  Relation: Spouse  Secondary Emergency Contact: Chery Spencer   [facility]  Home Phone: [phone]  Relation: Relative    Patient Anticipated Discharge Date: 11/3/2018   RN, PT, HHA to begin 24 - 48 hours after discharge.  PLEASE EVALUATE AND TREAT (Evaluation timeline is 24 - 48 hrs. Please call if there is need for a variance to this timeline).    REASON FOR REFERRAL: Assessment & Treatment: RN and lab draws    OTHER PERTINENT INFORMATION: Patient was last seen by provider on 11/2/2018 for chemotherapy induced N/V and radiation burns.    Current Outpatient Prescriptions:  morphine 0.1% in solosite 0.1% topical gel, Apply to rectal wound q4h PRN pain; to be mixed in Intrasite gel instead of Solosite., Disp: 150 g, Rfl: 0  oxyCODONE IR (ROXICODONE) 10 MG tablet, Take 1 tablet (10 mg) by mouth every 4 hours as needed for moderate to severe pain, Disp: 100 tablet, Rfl: 0      Patient Active Problem List:     Malignant neoplasm of anal canal (H)     Chronic atrial fibrillation (H)     Nicotine abuse     Atrial fibrillation (H)     Vomiting and diarrhea     Chemotherapy induced nausea and vomiting      Documentation of Face to Face and Certification for Home Health Services    I certify that patient, Jenise Calix  is under my care and that I, or a Nurse Practitioner or Physician's Assistant working with me, had a face-to-face encounter that meets the physician face-to-face encounter requirements with this patient on: 11/2/2018.    This encounter with the patient was in whole, or in part, for the following medical condition, which is the primary reason for Home Health Care: chemotherapy induced N/V and radiation burns.    I certify that, based on my findings, the following services are medically necessary Home Health Services: Nursing    My clinical findings support the need for the above services because: Nurse is needed: To provide assessment and oversight required in the home to assure adherence to the medical plan due to: radiation burns and lab draws..    Further, I certify that my clinical findings support that this patient is homebound (i.e. absences from home require considerable and taxing effort and are for medical reasons or Shinto services or infrequently or of short duration when for other reasons) because: Requires assistance of another person or specialized equipment to access medical services because patient: Requires supervision of another for safe transfer..    Based on the above findings, I certify that this patient is confined to the home and needs intermittent skilled nursing care, physical therapy and/or speech therapy.  The patient is under my care, and I have initiated the establishment of the plan of care.  This patient will be followed by a physician who will periodically review the plan of care.    Physician/Provider to provide follow up care: No Ref-Primary, Physician    Responsible PECOS certified Physician at time of discharge: Ander Mckinley    Please be aware that coverage of these services is subject to the terms and limitations of your health insurance plan.  Call member services at your health plan with any benefit or coverage questions.             Supplies     MISCELLANEOUS DME SUPPLY OR  ACCESSORY, NOT OTHERWISE SPECIFIED       Surgical Supply/Wound Care Form    Jenise Calix 1950 [id number]    Wound #1 Location: labia and perianal skin  Stage: partial thickness  Size (cm): area spanning labia to perianal skin, approximately [id number]   Undermining: no  Tunneling: no  Debrided: yes  Debridement type: manual   Amount of exudate: moderate  Color of exudate: serous  Frequency of dressing changes: 4 times per day (due to location as needing to be removed for urination)     Length of Need:  1 month   Completed by: Coni Ibanez RN, CWOCN  2018    Date of last wound assessment: 2018   Physician Name: Dr. Mckinley  Address: [address] [facility] [place] [address] [place] [address]  Phone: [phone]  Fax: [phone]    Supplies being ordered:   Primary: adaptic 3x8 inch 2 per day, send 28 each for 2 weeks  Miscellaneous: Hima white wash cloths, item #[id number] - 2 packs, (for cleaning wound)    Demographic Information on Jenise Calix:    Jenise Calix  Gender: female  : 1950  [address]  [facility] [address]  [phone] (home)     Medical Record: [id number]  Social Security Number: [id number]    Insurance: Payor: MEDICA / Plan: MEDICA PRIME SOLUTION / Product Type: Indemnity /   Group #:  (see attached)  Member ID:  (see attached)             Lab Orders     CBC with platelets and differential       Last Lab Result: Hemoglobin (g/dL)       Date                     Value                 2018               7.7 (L)          ----------             Your next 10 appointments already scheduled     2018 12:00 PM CST   Linear Accelerator with Lr [facility]   [facility] ([facility])    [address]  [facility] [address]   [phone]            2018 11:50 AM CST   Linear Accelerator with Lr [facility] Rad [facility]   Radiation Therapy Center ([facility])    [address]  [address]  [facility] [address]   [phone]              Statement of Approval     Ordered          11/05/18 1129  I have reviewed and agree with all the recommendations and orders detailed in this document.  EFFECTIVE NOW     Approved and electronically signed by:  Bimal Haley MD

## 2018-10-25 NOTE — PLAN OF CARE
"Patient tolerated broth.  VSS. No c/o pain, nausea, or vomiting.  MIVF infusing through port-a-cath.  Up with standby assist to the bathroom.  BP 91/50  Pulse 70  Temp 97.6  F (36.4  C) (Axillary)  Resp 18  Ht 1.702 m (5' 7\")  Wt 71.2 kg (156 lb 15.5 oz)  SpO2 99%  BMI 24.58 kg/m2    "

## 2018-10-25 NOTE — LETTER
"Transition Communication Hand-off for Care Transitions to Next Level of Care Provider    Name: Jenise Calix  : 1950  MRN #: [id number]  Primary Care Provider: Physician No Ref-Primary     Primary Clinic: No address on file     Reason for Hospitalization:  Pain control, nausea and vomiting  Chemotherapy induced nausea and vomiting  Admit Date/Time: 10/25/2018  2:04 PM  Discharge Date: ***  Payor Source: Payor: MEDICA / Plan: MEDICA PRIME SOLUTION / Product Type: Indemnity /     Readmission Assessment Measure (ROMULO) Risk Score/category: ***    Plan of Care Goals/Milestone Events:   Patient Concerns: ***   Patient Goals:   Short-term ***   Long-term ***   Medical Goals   Short-term ***   Long-term ***         Reason for Communication Hand-off Referral: {CCREASONCMCTNHANDOFFREFERRALCTS:08099}    Discharge Plan:  Discharge Plan:       Most Recent Value    Concerns To Be Addressed other (see comments) [Needs to practice independent wound cares]           Concern for non-adherence with plan of care:   Y/N ***  Discharge Needs Assessment:  Needs       Most Recent Value    Anticipated Changes Related to Illness none    Equipment Currently Used at Home none    Transportation Available family or friend will provide          Already enrolled in Tele-monitoring program and name of program:  ***  Follow-up specialty is recommended: {YES / NO:19701::\"Yes\"}    Follow-up plan:  Future Appointments  Date Time Provider Department Center   2018 12:00 PM Tech, Lr [facility] Owned   2018 12:00 PM [facility] Owned   2018 11:50 AM [facility] Own[facility]       Any outstanding tests or procedures:        Referrals     Future Labs/Procedures    Home Care Referral     Comments:    **Order classes of: [place] [facility], [facility] and [facility] will route to the Home Care and Hospice Referral Pool.  Home Care or Hospice will then contact the patient to schedule their appointment.**    If " you do not hear from Home Care and Hospice, or you would like to call to schedule, please call the referring place of service that your provider has listed below.  ______________________________________________________________________    Your provider has referred you to: [facility] (Phone: [phone] Fax: [phone])    Extended Emergency Contact Information  Primary Emergency Contact: Chau Calix   [facility]  Home Phone: [phone]  Mobile Phone: [phone]  Relation: Spouse  Secondary Emergency Contact: Chery Spencer   [facility]  Home Phone: [phone]  Relation: Relative    Patient Anticipated Discharge Date: 11/3/2018   RN, PT, HHA to begin 24 - 48 hours after discharge.  PLEASE EVALUATE AND TREAT (Evaluation timeline is 24 - 48 hrs. Please call if there is need for a variance to this timeline).    REASON FOR REFERRAL: Assessment & Treatment: RN and lab draws    OTHER PERTINENT INFORMATION: Patient was last seen by provider on 11/2/2018 for chemotherapy induced N/V and radiation burns.    Current Outpatient Prescriptions:  morphine 0.1% in solosite 0.1% topical gel, Apply to rectal wound q4h PRN pain; to be mixed in Intrasite gel instead of Solosite., Disp: 150 g, Rfl: 0  oxyCODONE IR (ROXICODONE) 10 MG tablet, Take 1 tablet (10 mg) by mouth every 4 hours as needed for moderate to severe pain, Disp: 100 tablet, Rfl: 0      Patient Active Problem List:     Malignant neoplasm of anal canal (H)     Chronic atrial fibrillation (H)     Nicotine abuse     Atrial fibrillation (H)     Vomiting and diarrhea     Chemotherapy induced nausea and vomiting      Documentation of Face to Face and Certification for Home Health Services    I certify that patient, Jenise Calix is under my care and that I, or a Nurse Practitioner or Physician's Assistant working with me, had a face-to-face encounter that meets the physician face-to-face encounter requirements with this patient on:  11/2/2018.    This encounter with the patient was in whole, or in part, for the following medical condition, which is the primary reason for Home Health Care: chemotherapy induced N/V and radiation burns.    I certify that, based on my findings, the following services are medically necessary Home Health Services: Nursing    My clinical findings support the need for the above services because: Nurse is needed: To provide assessment and oversight required in the home to assure adherence to the medical plan due to: radiation burns and lab draws..    Further, I certify that my clinical findings support that this patient is homebound (i.e. absences from home require considerable and taxing effort and are for medical reasons or Bahai services or infrequently or of short duration when for other reasons) because: Requires assistance of another person or specialized equipment to access medical services because patient: Requires supervision of another for safe transfer..    Based on the above findings, I certify that this patient is confined to the home and needs intermittent skilled nursing care, physical therapy and/or speech therapy.  The patient is under my care, and I have initiated the establishment of the plan of care.  This patient will be followed by a physician who will periodically review the plan of care.    Physician/Provider to provide follow up care: No Ref-Primary, Physician    Responsible PECOS certified Physician at time of discharge: Ander Mckinley    Please be aware that coverage of these services is subject to the terms and limitations of your health insurance plan.  Call member services at your health plan with any benefit or coverage questions.        Supplies     Future Labs/Procedures    MISCELLANEOUS DME SUPPLY OR ACCESSORY, NOT OTHERWISE SPECIFIED     Comments:    Surgical Supply/Wound Care Form    Jenise Calix 1950 [id number]    Wound #1 Location: labia and perianal skin  Stage:  partial thickness  Size (cm): area spanning labia to perianal skin, approximately [id number]   Undermining: no  Tunneling: no  Debrided: yes  Debridement type: manual   Amount of exudate: moderate  Color of exudate: serous  Frequency of dressing changes: 4 times per day (due to location as needing to be removed for urination)     Length of Need:  1 month   Completed by: Coni Ibanez RN, CWOCN  2018    Date of last wound assessment: 2018   Physician Name: Dr. Mckinley  Address: [address] [place] [address] [place] [address]  Phone: [phone]  Fax: [phone]    Supplies being ordered:   Primary: adaptic 3x8 inch 2 per day, send 28 each for 2 weeks  Miscellaneous: Hima white wash cloths, item #[id number] - 2 packs, (for cleaning wound)    Demographic Information on Jenise Calix:    Jenise Calix  Gender: female  : 1950  [address] [facility] [address]  [phone] (home)     Medical Record: [id number]  Social Security Number: [id number]    Insurance: Payor: MEDICA / Plan: MEDICA PRIME SOLUTION / Product Type: Indemnity /   Group #:  (see attached)  Member ID:  (see attached)          Key Recommendations:      MARGARET NORTH    AVS/Discharge Summary is the source of truth; this is a helpful guide for improved communication of patient story

## 2018-10-25 NOTE — PROGRESS NOTES
Infusion Nursing Note:  Jenise Calix presents today for PAC labs and IV fluids/Antinausea meds.    Patient seen by provider today: Yes: Dr. Gunn    present during visit today: Not Applicable.    Note: Gave report to Amy in Med/Surg prior to pt's admission.      Intravenous Access:  Implanted Port.    Treatment Conditions:  Not Applicable.      Post Infusion Assessment:  Patient tolerated infusion without incident.  Blood return noted pre and post infusion.  Site patent and intact, free from redness, edema or discomfort.  No evidence of extravasations.    Discharge Plan:   Patient discharged in stable condition to room 2302 in [place] [facility]/Surg accompanied by: friend.  Departure Mode: Wheelchair.      Shelley Nunez RN

## 2018-10-25 NOTE — H&P
Admitted:     10/25/2018      CHIEF COMPLAINT:  Nausea, vomiting, diarrhea and pelvic pain.      HISTORY OF PRESENT ILLNESS:  Jenise Calix is a 68-year-old female who was diagnosed with anal cancer in 07/2018.  She also has a history of paroxysmal atrial fibrillation and tobacco use.  The patient has been recently treated at [facility] with chemoradiation.  She started radiation this fall and is doing a 6-week course of radiation, which will be done next Thursday.  She had radiation today and is supposed to have radiation again tomorrow.  She also has been getting a chemo pump.  She has had 2 installments of this.  She receives mitomycin and 5-FU through the pump.  The second pump was placed 10/18/2018 and it was removed 10/22/2018.  On 10/23/2018 she developed persistent nausea, vomiting and intractable nonbloody diarrhea.  These symptoms persisted on 10/24/2018 and 10/25/2018 she came in for radiation therapy and she was seen in the oncology clinic.  Labs were obtained.  She had an absolute neutrophil count of 1.0.  She was admitted for treatment of the nausea, vomiting, diarrhea and also persistent pelvic pain.  She has redness throughout the pelvic area in a diaper like distribution with some ulcerations in the perianal area on the buttocks, these are superficial.  She has been using Silvadene cream and also lidocaine cream on these.  She has been using oxycodone for pain.  She says that it used to help, but it is not helping now.  She also has been given the MS Contin but she did not think it helped much for pain and it made her feel wobbly and it affected her balance, so she stopped this.      She talked to the radiation oncologist today who felt that she would be in the hospital for at least 2 nights.  She denies fever or any infectious symptoms such as URI symptoms, cough, chest pain, UTI symptoms or abdominal pain other than the pain that she thinks is coming from radiation.      The patient says  her diarrhea has been better today.  She has had 2 episodes.  She has some nausea, but she has had no recent vomiting.      PAST MEDICAL HISTORY:   1.  Paroxysmal atrial fibrillation that she has had since the birth of one of her children.  She has not had an episode for some time.  She is just on aspirin.   2.  Long-term cigarette use.   3.  Squamous cell carcinoma of the anus diagnosed 2018, currently being treated with chemoradiation through the cancer center here.      PAST SURGICAL HISTORY:  Breast biopsy of a benign lesion and port placement.      FAMILY MEDICAL HISTORY:  Father had alcoholism.  He  of complications resulting from a fall.  He also had an aneurysm.  Her mother had alcoholism.      HABITS:  She continues to smoke.  She rarely drinks alcohol.      SOCIAL HISTORY:  She lives near [place], [place].  She works at [employer] although has not been working recently.  She has no children in this area.  They apparently live in [place].  She has a sister who lives in the [facility].      ALLERGIES:  METOPROLOL, REACTION UNKNOWN.      MEDICATIONS:   1.  Aspirin 81 mg daily.   2.  Norco 5/325 one or 2 q.6h. p.r.n.   3.  Xylocaine 2% gel every 4 hours p.r.n.    4.  Viscous lidocaine 15 mL by mouth every 2 hours as needed for moderate pain.     5.  MS Contin 15 mg q.12h. -- stopped by the patient.   6.  Zofran 4 mg every 8 hours p.r.n.   7.  Oxycodone 5 mg every 4 hours p.r.n.   8.  Compazine 10 mg q.6h. p.r.n.   9.  Silvadene cream twice daily to perineum.      REVIEW OF SYSTEMS:  She has had no fever, chills, headache, head or neck symptoms, URI symptoms, cough, shortness of breath, chest pain.  She has pelvic pain, but no upper abdominal pain.  She has had diarrhea, nausea and vomiting.  No blood in the stool, no urinary tract symptoms.  She has a red rash in her perineum from the radiation.  No other skin symptoms.  No neurological symptoms.  The rest of the 10-point review of systems is negative.       PHYSICAL EXAMINATION:   GENERAL:  She is alert, nondistressed.   VITAL SIGNS:  Temperature 96.3, pulse 70, blood pressure 128/50, respiratory rate 16, saturation 99% on room air.   HEENT:  Ears negative.  Oral negative.  Eyes:  Pupils round, react to light.   NECK:  Supple, no mass, no meningeal signs.   LUNGS:  Clear.   COR:  S1, S2, without click, rub or murmur.   CHEST:  She has a port in the left upper chest.   ABDOMEN:  Soft, benign, nontender, without guarding, rebound, rigidity or mass.   GENITALIA:  She has a red rash from radiation in the diaper light distribution.  She has some shallow skin ulcerations in the intertriginous areas of her buttocks.   EXTREMITIES:  No edema.  Homans sign is negative.   NEUROLOGIC:  Cranial nerves and motor reflexes grossly normal.      LABORATORY DATA:  Comprehensive metabolic panel notable for a bilirubin of 1.4, albumin 2.9, total protein of 6.6.  CBC showed a white count of 1.3 with an absolute neutrophil count of 1.0.  Hemoglobin was 11.8, platelet count was 100.  UA and UC ordered by myself are pending.      ASSESSMENT:   1.  Chemo and radiation-induced nausea, vomiting, diarrhea.   2.  Squamous cell cancer of the anus.   3.  Borderline neutropenia with ANC of 1.0.   4.  Thrombocytopenia chemo related.   5.  Pelvic pain from radiation.   6.  History of paroxysmal atrial fibrillation, on aspirin.      PLAN:  She will receive iv fluids and antiemetics. We will  increase her oxycodone to 10 mg per dose.  We will provide IV Dilaudid p.r.n.  I ordered a UA and a UC.  We will repeat her blood counts tomorrow.  The radiation oncologist felt that she would likely be in the hospital for 2 nights, so I admitted her to a full admission.  We will continue Silvadene and lidocaine creams to the peritoneum.  We will also obtain an enteric pathogen panel.  A Clostridium difficile was already obtained through the oncology clinic and is pending.  Prophylaxis will be with just her  regular asa and not lovenox due to mild thrombocytopenia.        VERONICA JONAS MD             D: 10/25/2018   T: 10/25/2018   MT: SUSHANT      Name:     JANNIE MCINTYRE   MRN:      [id number]        Account:      [id number]   :      1950        Admitted:     10/25/2018                   Document: [id number]

## 2018-10-25 NOTE — MR AVS SNAPSHOT
After Visit Summary   10/25/2018    Jenise Calix    MRN: [id number]           Patient Information     Date Of Birth          1950        Visit Information        Provider Department      10/25/2018 11:00 AM Ced Gunn MD [facility] ONCOLOGY      Today's Diagnoses     Malignant neoplasm of anal canal (H)    -  1    Vomiting and diarrhea        Nicotine abuse        Atrial fibrillation, unspecified type (H)          Care Instructions    Getting IVF today and will be admitting to hospital for direct admit pain control.           Follow-ups after your visit        Follow-up notes from your care team     Return in about 4 weeks (around 11/22/2018).      Your next 10 appointments already scheduled     Oct 30, 2018 10:30 AM CDT   Linear Accelerator with Lr [facility]   Radiation Therapy Center ([facility])    [address], [address]  [facility] [address]   [phone]            Oct 31, 2018 10:30 AM CDT   Linear Accelerator with Lr [facility]   Radiation Therapy Center ([facility])    [address], [address]  [facility] [address]   [phone]            Oct 31, 2018 10:45 AM CDT   ON TREATMENT VISIT with Edmundo Iqbal MD   Radiation Therapy Center ([facility])    [address], [address]  [facility] [address]   [phone]            Nov 01, 2018 12:00 PM CDT   Linear Accelerator with Lr [facility]   Radiation Therapy Center ([facility])    [address], [address]  [facility] [address]   [phone]              Who to contact     If you have questions or need follow up information about today's clinic visit or your schedule please contact [facility] directly at [phone].  Normal or non-critical lab and imaging results will be communicated to you by MyChart, letter or phone within 4 business days after the clinic has received the results. If you do not hear from us within 7  "days, please contact the clinic through [facility] or phone. If you have a critical or abnormal lab result, we will notify you by phone as soon as possible.  Submit refill requests through [facility] or call your pharmacy and they will forward the refill request to us. Please allow 3 business days for your refill to be completed.          Additional Information About Your Visit        Care EveryWhere ID     This is your Care EveryWhere ID. This could be used by other organizations to access your [facility] medical records  [id number]        Your Vitals Were     Pulse Temperature Respirations Height Pulse Oximetry Breastfeeding?    106 98.5  F (36.9  C) (Tympanic) 16 1.702 m (5' 7\") 98% No    BMI (Body Mass Index)                   23.98 kg/m2            Blood Pressure from Last 3 Encounters:   10/29/18 116/51   10/25/18 110/68   10/22/18 125/50    Weight from Last 3 Encounters:   10/29/18 73.9 kg (162 lb 14.7 oz)   10/25/18 69.4 kg (153 lb 1.6 oz)   10/17/18 73.1 kg (161 lb 3.2 oz)              Today, you had the following     No orders found for display       Primary Care Provider Fax #    Physician No Ref-Primary [phone]       No address on file        Equal Access to Services     AGUSTIN HAMILTON : Haddavid mcdanielso Kevin, waaxda luqadaha, qaybta kaalmada adeegyada, katina whitaker . So [facility] [phone].    ATENCIÓN: Si habla español, tiene a sanchez disposición servicios gratuitos de asistencia lingüística. Llame al [phone].    We comply with applicable federal civil rights laws and [place] laws. We do not discriminate on the basis of race, color, national origin, age, disability, sex, sexual orientation, or gender identity.            Thank you!     Thank you for choosing [facility]  for your care. Our goal is always to provide you with excellent care. Hearing back from our patients is one way we can continue to improve our services. Please take a few minutes to complete " the written survey that you may receive in the mail after your visit with us. Thank you!             Your Updated Medication List - Protect others around you: Learn how to safely use, store and throw away your medicines at www.disposemymeds.org.          This list is accurate as of 10/25/18  2:04 PM.  Always use your most recent med list.                   Brand Name Dispense Instructions for use Diagnosis    aspirin 81 MG tablet      Take 81 mg by mouth daily        lidocaine (viscous) 2 % solution    XYLOCAINE    100 mL    Take 15 mLs by mouth every 2 hours as needed for moderate pain swish and spit every 3-8 hours as needed; max 8 doses/24 hour period    Mucositis due to chemotherapy       morphine 15 MG 12 hr tablet    MS CONTIN    60 tablet    Take 1 tablet (15 mg) by mouth every 12 hours maximum 2 tablet(s) per day    Malignant neoplasm of anal canal (H)       ondansetron 4 MG tablet    ZOFRAN    18 tablet    Take 1 tablet (4 mg) by mouth every 8 hours as needed for nausea    Nausea       oxyCODONE 5 MG capsule    OXY-IR    60 capsule    Take 1 capsule (5 mg) by mouth every 4 hours as needed for severe pain    Malignant neoplasm of anal canal (H)       prochlorperazine 10 MG tablet    COMPAZINE    30 tablet    Take 1 tablet (10 mg) by mouth every 6 hours as needed (Nausea/Vomiting)    Malignant neoplasm of anal canal (H)       silver sulfADIAZINE 1 % cream    SILVADENE    25 g    Apply topically 2 times daily    Malignant neoplasm of anal canal (H)

## 2018-10-25 NOTE — IP AVS SNAPSHOT
MRN:[id number]                      After Visit Summary   10/25/2018    Jenise Calix    MRN: [id number]           Thank you!     Thank you for choosing [facility] for your care. Our goal is always to provide you with excellent care. Hearing back from our patients is one way we can continue to improve our services. Please take a few minutes to complete the written survey that you may receive in the mail after you visit with us. Thank you!        Patient Information     Date Of Birth          1950        Designated Caregiver       Most Recent Value    Caregiver    Will someone help with your care after discharge? no    Name of designated caregiver [facility]    Phone number of caregiver [phone]      About your hospital stay     You were admitted on:  October 25, 2018 You last received care in the:  [facility]    You were discharged on:  November 5, 2018       Who to Call     For medical emergencies, please call 911.  For non-urgent questions about your medical care, please call your primary care provider or clinic, None          Attending Provider     Provider Specialty    Enmanuel Jerez MD Internal Medicine    Arlen, Ander MEADOWS MD [facility], Talat BETTENCOURT MD Internal Medicine    Tera, Bimal Gates MD [facility]       Primary Care Provider Fax #    Physician No Ref-Primary [phone]      Additional Services     Home Care Referral       **Order classes of: [facility], [facility] and [facility] will route to the Home Care and Hospice Referral Pool.  Home Care or Hospice will then contact the patient to schedule their appointment.**    If you do not hear from Home Care and Hospice, or you would like to call to schedule, please call the referring place of service that your provider has listed below.  ______________________________________________________________________    Your provider has referred you to: [facility] (Phone: [phone] Fax: [phone])    Extended Emergency Contact Information  Primary Emergency Contact: Chau Calix   [facility]  Home Phone: [phone]  Mobile Phone: [phone]  Relation: Spouse  Secondary Emergency Contact: Chery Spencer   [facility]  Home Phone: [phone]  Relation: Relative    Patient Anticipated Discharge Date: 11/3/2018   RN, PT, HHA to begin 24 - 48 hours after discharge.  PLEASE EVALUATE AND TREAT (Evaluation timeline is 24 - 48 hrs. Please call if there is need for a variance to this timeline).    REASON FOR REFERRAL: Assessment & Treatment: RN and lab draws    OTHER PERTINENT INFORMATION: Patient was last seen by provider on 11/2/2018 for chemotherapy induced N/V and radiation burns.    Current Outpatient Prescriptions:  morphine 0.1% in solosite 0.1% topical gel, Apply to rectal wound q4h PRN pain; to be mixed in Intrasite gel instead of Solosite., Disp: 150 g, Rfl: 0  oxyCODONE IR (ROXICODONE) 10 MG tablet, Take 1 tablet (10 mg) by mouth every 4 hours as needed for moderate to severe pain, Disp: 100 tablet, Rfl: 0      Patient Active Problem List:     Malignant neoplasm of anal canal (H)     Chronic atrial fibrillation (H)     Nicotine abuse     Atrial fibrillation (H)     Vomiting and diarrhea     Chemotherapy induced nausea and vomiting      Documentation of Face to Face and Certification for Home Health Services    I certify that patient, Jenise Calix is under my care and that I, or a Nurse Practitioner or Physician's Assistant working with me, had a face-to-face encounter that meets the physician face-to-face encounter requirements with this patient on: 11/2/2018.    This encounter with the patient was in whole, or in part, for the following medical condition, which is the primary reason for Home Health Care: chemotherapy induced N/V and radiation burns.    I certify that, based on my findings, the following services are medically necessary Home Health  Services: Nursing    My clinical findings support the need for the above services because: Nurse is needed: To provide assessment and oversight required in the home to assure adherence to the medical plan due to: radiation burns and lab draws..    Further, I certify that my clinical findings support that this patient is homebound (i.e. absences from home require considerable and taxing effort and are for medical reasons or [employer] services or infrequently or of short duration when for other reasons) because: Requires assistance of another person or specialized equipment to access medical services because patient: Requires supervision of another for safe transfer..    Based on the above findings, I certify that this patient is confined to the home and needs intermittent skilled nursing care, physical therapy and/or speech therapy.  The patient is under my care, and I have initiated the establishment of the plan of care.  This patient will be followed by a physician who will periodically review the plan of care.    Physician/Provider to provide follow up care: No Ref-Primary, Physician    Responsible PECOS certified Physician at time of discharge: Ander Mckinley    Please be aware that coverage of these services is subject to the terms and limitations of your health insurance plan.  Call member services at your health plan with any benefit or coverage questions.                  Future tests that were ordered for you     MISCELLANEOUS DME SUPPLY OR ACCESSORY, NOT OTHERWISE SPECIFIED       Surgical Supply/Wound Care Form    Jenise Yogi 1950 [id number]    Wound #1 Location: labia and perianal skin  Stage: partial thickness  Size (cm): area spanning labia to perianal skin, approximately [id number]   Undermining: no  Tunneling: no  Debrided: yes  Debridement type: manual   Amount of exudate: moderate  Color of exudate: serous  Frequency of dressing changes: 4 times per day (due to location as needing to be  removed for urination)     Length of Need:  1 month   Completed by: Coni Ibanez RN, CWOCN  2018    Date of last wound assessment: 2018   Physician Name: Dr. Mckinley  Address: [address] [place] [address] [place] [address]  Phone: [phone]  Fax: [phone]    Supplies being ordered:   Primary: adaptic 3x8 inch 2 per day, send 28 each for 2 weeks  Miscellaneous: Hima white wash cloths, item #[id number] - 2 packs, (for cleaning wound)    Demographic Information on Jenise Calix:    Jenise Calix  Gender: female  : 1950  [address]  [place] [address]  [phone] (home)     Medical Record: [id number]  Social Security Number: [id number]    Insurance: Payor: MEDICA / Plan: MEDICA PRIME SOLUTION / Product Type: Indemnity /   Group #:  (see attached)  Member ID:  (see attached)            CBC with platelets and differential       Last Lab Result: Hemoglobin (g/dL)       Date                     Value                 2018               7.7 (L)          ----------                  Further instructions from your care team       Patient has Morphine Gel that needs to be sent home with her in lock box.    If you are going to need a refill on your morphine gel, expect a 4 to 7 day delay after you drop the prescription off as [place].  Also check on the price--your insurance MAY NOT cover the cost.  Use it sparingly, and apply it ONLY around the opening to the vagina and rectum.  So request a refill EARLY. The compounding pharmacy is making up some more morphine gel for you. They will contact you 18 to discuss delivery options. Their number is [phone].    If you don't have a SITZ bath at home, go to your local medical supply store and consider buying one.  Or you can purchase one off of [employer].          Home care nurse can check blood counts to assess when you can resume radiation:  "goal platelets >50 and ANC>1500    For the pain:   -use tylenol 1000 mg 3 times/day  -add oxycodone 5mg every 4 hrs as needed  -morphine gel with dressing changes.     For the paroxysmal atrial flutter  -diltiazem 180 mg daily to slow the rate when they occur  -would follow up with cardiology to consider ablation at some point    Home care to check CBC Wednesday--if platelets are 50,000 or greater--notify radiation oncology to restart radiation.   If platelets are less than 50,000--recheck every other day until they are greater than 50,000.  If ANC is less than 1.0-----call Dr Gunn's office. And please update Dr Iqbal-radiation at [phone] as well.   Fax all results to [phone]        Pending Results     Date and Time Order Name Status Description    11/2/2018 1115 EKG 12-lead, tracing only In process     11/1/2018 0108 EKG 12-LEAD, TRACING ONLY In process     10/30/2018 1117 EKG 12-lead, tracing only In process     10/28/2018 0940 EKG 12-LEAD, TRACING ONLY In process             Statement of Approval     Ordered          11/05/18 1129  I have reviewed and agree with all the recommendations and orders detailed in this document.  EFFECTIVE NOW     Approved and electronically signed by:  Bimal Haley MD             Admission Information     Date & Time Provider Department Dept. Phone    10/25/2018 Bimal Haley MD [facility] [phone]      Your Vitals Were     Blood Pressure Pulse Temperature Respirations Height Weight    127/46 (BP Location: Right arm) 82 98.2  F (36.8  C) (Oral) 16 1.702 m (5' 7\") 76.2 kg (167 lb 15.9 oz)    Pulse Oximetry BMI (Body Mass Index)                99% 26.31 kg/m2          Care EveryWhere ID     This is your Care EveryWhere ID. This could be used by other organizations to access your [facility] medical records  [id number]        Equal Access to Services     AGUSTIN HAMILTON AH: Radha Brown, denise mon, yolanda valdovinos " katina moore roland zamora'aan ah. So [facility] [phone].    ATENCIÓN: Si habla bhupinder, tiene a sanchez disposición servicios gratuitos de asistencia lingüística. Chalino al [phone].    We comply with applicable federal civil rights laws and [place] laws. We do not discriminate on the basis of race, color, national origin, age, disability, sex, sexual orientation, or gender identity.               Review of your medicines      START taking        Dose / Directions    acetaminophen 500 MG tablet   Commonly known as:  TYLENOL   Used for:  Radiation burn        Dose:  1000 mg   Take 2 tablets (1,000 mg) by mouth 3 times daily   Refills:  0       diltiazem 180 MG 24 hr capsule   Used for:  Paroxysmal atrial flutter (H)        Dose:  180 mg   Take 1 capsule (180 mg) by mouth daily   Quantity:  30 capsule   Refills:  0       morphine 0.1% in solosite 0.1% topical gel   Used for:  Malignant neoplasm of anal canal (H), Radiation burn        Apply to rectal wound q4h PRN pain; to be mixed in Intrasite gel instead of Solosite.   Quantity:  150 g   Refills:  0       oxyCODONE IR 10 MG tablet   Commonly known as:  ROXICODONE   Used for:  Malignant neoplasm of anal canal (H), Radiation burn   Replaces:  oxyCODONE 5 MG capsule        Dose:  10 mg   Take 1 tablet (10 mg) by mouth every 4 hours as needed for moderate to severe pain   Quantity:  100 tablet   Refills:  0       polyethylene glycol Packet   Commonly known as:  MIRALAX/GLYCOLAX   Used for:  Malignant neoplasm of anal canal (H)   Notes to Patient:  Continue daily until you have a bowel movement and while on oxycodone to prevent constipation.         Dose:  17 g   Take 17 g by mouth daily as needed for constipation   Quantity:  7 packet   Refills:  0         CONTINUE these medicines which have NOT CHANGED        Dose / Directions    aspirin 81 MG tablet   Notes to Patient:  Not given, resume        Dose:  81 mg   Take 81 mg by mouth daily   Refills:  0        lidocaine (viscous) 2 % solution   Commonly known as:  XYLOCAINE   Used for:  Mucositis due to chemotherapy        Dose:  15 mL   Take 15 mLs by mouth every 2 hours as needed for moderate pain swish and spit every 3-8 hours as needed; max 8 doses/24 hour period   Quantity:  100 mL   Refills:  3       ondansetron 4 MG tablet   Commonly known as:  ZOFRAN   Used for:  Nausea   Notes to Patient:  Not given, resume as needed        Dose:  4 mg   Take 1 tablet (4 mg) by mouth every 8 hours as needed for nausea   Quantity:  18 tablet   Refills:  1       prochlorperazine 10 MG tablet   Commonly known as:  COMPAZINE   Used for:  Malignant neoplasm of anal canal (H)   Notes to Patient:  Not given, resume as needed         Dose:  10 mg   Take 1 tablet (10 mg) by mouth every 6 hours as needed (Nausea/Vomiting)   Quantity:  30 tablet   Refills:  1       silver sulfADIAZINE 1 % cream   Commonly known as:  SILVADENE   Used for:  Malignant neoplasm of anal canal (H)        Apply topically 2 times daily   Quantity:  25 g   Refills:  1         STOP taking     morphine 15 MG 12 hr tablet   Commonly known as:  MS CONTIN           oxyCODONE 5 MG capsule   Commonly known as:  OXY-IR   Replaced by:  oxyCODONE IR 10 MG tablet                Where to get your medicines      Some of these will need a paper prescription and others can be bought over the counter. Ask your nurse if you have questions.     Bring a paper prescription for each of these medications     diltiazem 180 MG 24 hr capsule    morphine 0.1% in solosite 0.1% topical gel    oxyCODONE IR 10 MG tablet       You don't need a prescription for these medications     acetaminophen 500 MG tablet    polyethylene glycol Packet                Protect others around you: Learn how to safely use, store and throw away your medicines at www.disposemymeds.org.        Information about OPIOIDS     PRESCRIPTION OPIOIDS: WHAT YOU NEED TO KNOW   We gave you an opioid (narcotic) pain  medicine. It is important to manage your pain, but opioids are not always the best choice. You should first try all the other options your care team gave you. Take this medicine for as short a time (and as few doses) as possible.    Some activities can increase your pain, such as bandage changes or therapy sessions. It may help to take your pain medicine 30 to 60 minutes before these activities. Reduce your stress by getting enough sleep, working on hobbies you enjoy and practicing relaxation or meditation. Talk to your care team about ways to manage your pain beyond prescription opioids.    These medicines have risks:    DO NOT drive when on new or higher doses of pain medicine. These medicines can affect your alertness and reaction times, and you could be arrested for driving under the influence (DUI). If you need to use opioids long-term, talk to your care team about driving.    DO NOT operate heavy machinery    DO NOT do any other dangerous activities while taking these medicines.    DO NOT drink any alcohol while taking these medicines.     If the opioid prescribed includes acetaminophen, DO NOT take with any other medicines that contain acetaminophen. Read all labels carefully. Look for the word  acetaminophen  or  Tylenol.  Ask your pharmacist if you have questions or are unsure.    You can get addicted to pain medicines, especially if you have a history of addiction (chemical, alcohol or substance dependence). Talk to your care team about ways to reduce this risk.    All opioids tend to cause constipation. Drink plenty of water and eat foods that have a lot of fiber, such as fruits, vegetables, prune juice, apple juice and high-fiber cereal. Take a laxative (Miralax, milk of magnesia, Colace, Senna) if you don t move your bowels at least every other day. Other side effects include upset stomach, sleepiness, dizziness, throwing up, tolerance (needing more of the medicine to have the same effect), physical  dependence and slowed breathing.    Store your pills in a secure place, locked if possible. We will not replace any lost or stolen medicine. If you don t finish your medicine, please throw away (dispose) as directed by your pharmacist. The [place] Pollution Control Agency has more information about safe disposal: https://www.pca.[facility].[place].us/living-green/managing-unwanted-medications             Medication List: This is a list of all your medications and when to take them. Check marks below indicate your daily home schedule. Keep this list as a reference.      Medications           Morning Afternoon Evening Bedtime As Needed    acetaminophen 500 MG tablet   Commonly known as:  TYLENOL   Take 2 tablets (1,000 mg) by mouth 3 times daily   Last time this was given:  1,000 mg on 11/5/2018  1:39 PM   Next Dose Due:  11/05/18                                         aspirin 81 MG tablet   Take 81 mg by mouth daily   Notes to Patient:  Not given, resume                                diltiazem 180 MG 24 hr capsule   Take 1 capsule (180 mg) by mouth daily   Last time this was given:  180 mg on 11/5/2018  8:34 AM   Next Dose Due:  11/06/18                                   lidocaine (viscous) 2 % solution   Commonly known as:  XYLOCAINE   Take 15 mLs by mouth every 2 hours as needed for moderate pain swish and spit every 3-8 hours as needed; max 8 doses/24 hour period                                   morphine 0.1% in solosite 0.1% topical gel   Apply to rectal wound q4h PRN pain; to be mixed in Intrasite gel instead of Solosite.   Last time this was given:  11/3/2018  8:23 PM                                   ondansetron 4 MG tablet   Commonly known as:  ZOFRAN   Take 1 tablet (4 mg) by mouth every 8 hours as needed for nausea   Notes to Patient:  Not given, resume as needed                                   oxyCODONE IR 10 MG tablet   Commonly known as:  ROXICODONE   Take 1 tablet (10 mg) by mouth every 4 hours as needed  for moderate to severe pain   Last time this was given:  10 mg on 11/5/2018  2:34 AM                                   polyethylene glycol Packet   Commonly known as:  MIRALAX/GLYCOLAX   Take 17 g by mouth daily as needed for constipation   Last time this was given:  17 g on 11/5/2018  8:33 AM   Notes to Patient:  Continue daily until you have a bowel movement and while on oxycodone to prevent constipation.                                    prochlorperazine 10 MG tablet   Commonly known as:  COMPAZINE   Take 1 tablet (10 mg) by mouth every 6 hours as needed (Nausea/Vomiting)   Last time this was given:  5 mg on 11/1/2018  5:50 AM   Notes to Patient:  Not given, resume as needed                                    silver sulfADIAZINE 1 % cream   Commonly known as:  SILVADENE   Apply topically 2 times daily   Last time this was given:  10/31/2018  8:45 PM

## 2018-10-25 NOTE — NURSING NOTE
"Oncology Rooming Note    October 25, 2018 11:20 AM   Jenise Calix is a 68 year old female who presents for:    Chief Complaint   Patient presents with     Oncology Clinic Visit     Follow up Anal cancer.      Initial Vitals: /68 (BP Location: Right arm, Patient Position: Sitting, Cuff Size: Adult Regular)  Pulse 106  Temp 98.5  F (36.9  C) (Tympanic)  Resp 16  Ht 1.702 m (5' 7\")  Wt 69.4 kg (153 lb 1.6 oz)  SpO2 98%  Breastfeeding? No  BMI 23.98 kg/m2 Estimated body mass index is 23.98 kg/(m^2) as calculated from the following:    Height as of this encounter: 1.702 m (5' 7\").    Weight as of this encounter: 69.4 kg (153 lb 1.6 oz). Body surface area is 1.81 meters squared.  Extreme Pain (8) Comment: perineal area.    No LMP recorded. Patient is postmenopausal.  Allergies reviewed: Yes  Medications reviewed: Yes    Medications: Medication refills not needed today.  Pharmacy name entered into EPIC:    [id number] - [place], [place] - [address] [facility] [place], [place] - [address]    Clinical concerns: Follow up Anal cancer. C/o nausea, diarrhea x 3 days, 8/10 pain in her perineal area, mouth soreness.     8 minutes for nursing intake (face to face time)     Faby Neff, [facility]            "

## 2018-10-25 NOTE — IP AVS SNAPSHOT
[facility]    [address] [address]    Phone:  [phone]    Fax:  [phone]                                       After Visit Summary   10/25/2018    Jenise Calix    MRN: [id number]           After Visit Summary Signature Page     I have received my discharge instructions, and my questions have been answered. I have discussed any challenges I see with this plan with the nurse or doctor.    ..........................................................................................................................................  Patient/Patient Representative Signature      ..........................................................................................................................................  Patient Representative Print Name and Relationship to Patient    ..................................................               ................................................  Date                                   Time    ..........................................................................................................................................  Reviewed by Signature/Title    ...................................................              ..............................................  Date                                               Time          22EPIC Rev 08/18

## 2018-10-25 NOTE — LETTER
10/25/2018         RE: Jenise Calix  [address]  [place] [address]        Dear Colleague,    Thank you for referring your patient, Jenise Calix, to the [facility]. Please see a copy of my visit note below.    Hematology/ Oncology Follow-up Visit:  Oct 25, 2018    Reason for Visit:   Chief Complaint   Patient presents with     Oncology Clinic Visit     Follow up Anal cancer.        Oncologic History:  Malignant neoplasm of anal canal (H)  Jenise Calix has been difficulty with hemorrhoids for several years.  She saw  last year in September 2017 because of large hemorrhoids and it was recommended to proceed with surgery.  Patient elected to postpone the surgery and go to [place] to visit her children.  She continued to have difficulty with defecation.  She has been having also pain with bowel movement as well.  She has been taking stool softener.  She has been also having occasional rectal bleeding.  She recently saw Dr. Becerra on July 16, 2018 and had a flexible sigmoidoscopy which showed severely ulcerated lesion at 12 o'clock position was definitive irregularity and fullness at 6 o'clock position.  Biopsies were obtained.  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  Patient had a CT scan of the chest abdomen and pelvis on July 16, 2018 which revealed 0.9 cm nodule he will aspect of the right breast felt to be sebaceous cyst.  There was a mass involving the anus and the rectum particularly on the right measuring about 7 cm x 4 cm and extending anterior to posterior 5 cm posterior and 10 cm in longest axis.  This is inseparable from the floor of urinary bladder, extending to the skin service and engulfing the vagina and urethra.  The perirectal and anal lymph nodes were involved.  There is also a 3 cm mass in the left inguinal nodes.  PET  "scan was done showing  intensely hypermetabolic mass in the anal canal consistent with primary malignancy.  There is hypermetabolic adenopathy in the left inguinal location worrisome for malignant adenopathy.. There is a focal hypermetabolic area in the left thyroid indeterminant.. No suggestion of metastatic disease.    Patient started on radiation radiation therapy concurrent with infusional 5-FU and mitomycin.      Interval History:  She returning today for follow-up.  She has been having increasing nausea and vomiting lately.  She has been also having pain at the radiation site.  Pain medication has been giving the patient nausea.  She is also having diarrhea as well.    Review Of Systems:  Constitutional: Negative for fever, chills, and night sweats.  Skin: negative.  Eyes: negative.  Ears/Nose/Throat: negative.  Respiratory: No shortness of breath, dyspnea on exertion, cough, or hemoptysis.  Cardiovascular: negative.  Gastrointestinal: negative.  Genitourinary: negative.  Musculoskeletal: negative.  Neurologic: negative.  Psychiatric: negative.  Hematologic/Lymphatic/Immunologic: negative.  Endocrine: negative.    All other ROS negative unless mentioned in interval history.    Past medical, social, surgical, and family histories reviewed.    Allergies:  Allergies as of 10/25/2018 - Chau as Reviewed 10/25/2018   Allergen Reaction Noted     Metoprolol  07/26/2018       Current Medications:  No current outpatient prescriptions on file.        Physical Exam:  /68 (BP Location: Right arm, Patient Position: Sitting, Cuff Size: Adult Regular)  Pulse 106  Temp 98.5  F (36.9  C) (Tympanic)  Resp 16  Ht 1.702 m (5' 7\")  Wt 69.4 kg (153 lb 1.6 oz)  SpO2 98%  Breastfeeding? No  BMI 23.98 kg/m2  Wt Readings from Last 12 Encounters:   10/29/18 73.9 kg (162 lb 14.7 oz)   10/25/18 69.4 kg (153 lb 1.6 oz)   10/17/18 73.1 kg (161 lb 3.2 oz)   10/10/18 73.5 kg (162 lb)   10/10/18 73.5 kg (162 lb)   10/03/18 72.1 kg " (159 lb)   09/26/18 74.8 kg (165 lb)   09/18/18 76.7 kg (169 lb)   09/14/18 76.7 kg (169 lb)   08/30/18 76.9 kg (169 lb 9.6 oz)   08/29/18 77.2 kg (170 lb 3.2 oz)   08/23/18 77.4 kg (170 lb 11.2 oz)     ECOG performance status: 1  GENERAL APPEARANCE: Healthy, alert and in no acute distress.  HEENT: Sclerae anicteric. PERRLA. Oropharynx without ulcers, lesions, or thrush.  NECK: Supple. No asymmetry or masses.  LYMPHATICS: No palpable cervical, supraclavicular, axillary, or inguinal lymphadenopathy.  RESP: Lungs clear to auscultation bilaterally without rales, rhonchi or wheezes.  CARDIOVASCULAR: Regular rate and rhythm. Normal S1, S2; no S3 or S4. No murmur, gallop, or rub.  ABDOMEN: Soft, nontender. Bowel sounds normal. No palpable organomegaly or masses.  MUSCULOSKELETAL: Extremities without gross deformities noted. No edema of bilateral lower extremities.  SKIN: No suspicious lesions or rashes.  NEURO: Alert and oriented x 3. Cranial nerves II-XII grossly intact.  PSYCHIATRIC: Mentation and affect appear normal.    Laboratory/Imaging Studies:  Infusion Therapy Visit on 10/18/2018   Component Date Value Ref Range Status     WBC 10/18/2018 6.5  4.0 - 11.0 10e9/L Final     RBC Count 10/18/2018 3.71* 3.8 - 5.2 10e12/L Final     Hemoglobin 10/18/2018 12.5  11.7 - 15.7 g/dL Final     Hematocrit 10/18/2018 35.8  35.0 - 47.0 % Final     MCV 10/18/2018 97  78 - 100 fl Final     MCH 10/18/2018 33.7* 26.5 - 33.0 pg Final     MCHC 10/18/2018 34.9  31.5 - 36.5 g/dL Final     RDW 10/18/2018 13.2  10.0 - 15.0 % Final     Platelet Count 10/18/2018 174  150 - 450 10e9/L Final     Diff Method 10/18/2018 Automated Method   Final     % Neutrophils 10/18/2018 85.7  % Final     % Lymphocytes 10/18/2018 4.6  % Final     % Monocytes 10/18/2018 7.8  % Final     % Eosinophils 10/18/2018 1.4  % Final     % Basophils 10/18/2018 0.2  % Final     % Immature Granulocytes 10/18/2018 0.3  % Final     Nucleated RBCs 10/18/2018 0  0 /100 Final      Absolute Neutrophil 10/18/2018 5.6  1.6 - 8.3 10e9/L Final     Absolute Lymphocytes 10/18/2018 0.3* 0.8 - 5.3 10e9/L Final     Absolute Monocytes 10/18/2018 0.5  0.0 - 1.3 10e9/L Final     Absolute Eosinophils 10/18/2018 0.1  0.0 - 0.7 10e9/L Final     Absolute Basophils 10/18/2018 0.0  0.0 - 0.2 10e9/L Final     Abs Immature Granulocytes 10/18/2018 0.0  0 - 0.4 10e9/L Final     Absolute Nucleated RBC 10/18/2018 0.0   Final     Sodium 10/18/2018 137  133 - 144 mmol/L Final     Potassium 10/18/2018 4.1  3.4 - 5.3 mmol/L Final     Chloride 10/18/2018 106  94 - 109 mmol/L Final     Carbon Dioxide 10/18/2018 25  20 - 32 mmol/L Final     Anion Gap 10/18/2018 6  3 - 14 mmol/L Final     Glucose 10/18/2018 85  70 - 99 mg/dL Final     Urea Nitrogen 10/18/2018 10  7 - 30 mg/dL Final     Creatinine 10/18/2018 0.61  0.52 - 1.04 mg/dL Final     GFR Estimate 10/18/2018 >90  >60 mL/min/1.7m2 Final    Non  GFR Calc     GFR Estimate If Black 10/18/2018 >90  >60 mL/min/1.7m2 Final    African American GFR Calc     Calcium 10/18/2018 9.1  8.5 - 10.1 mg/dL Final     Bilirubin Total 10/18/2018 0.4  0.2 - 1.3 mg/dL Final     Albumin 10/18/2018 2.9* 3.4 - 5.0 g/dL Final     Protein Total 10/18/2018 6.2* 6.8 - 8.8 g/dL Final     Alkaline Phosphatase 10/18/2018 79  40 - 150 U/L Final     ALT 10/18/2018 18  0 - 50 U/L Final     AST 10/18/2018 11  0 - 45 U/L Final        Recent Results (from the past 744 hour(s))   XR Chest Port 1 View    Narrative    XR CHEST PORT 1 VW 10/28/2018 11:36 AM    HISTORY: Fever.     COMPARISON: September 18, 2018.       Impression    IMPRESSION: Left-sided portacatheter in position as before. The lungs  are clear. No focal pulmonary opacities. Heart and mediastinum are  unremarkable. No acute cardiopulmonary abnormalities.     MELISSA CRUZ MD       Assessment and plan:  (C21.1) Malignant neoplasm of anal canal (H)  (primary encounter diagnosis)  Patient will continue with her treatment plan with  patient therapy concurrently with Xeloda.  Patient will be hospitalized to conclude her radiation therapy as inpatient.  We will see the patient following treatment concluded to assess response to treatment.    (R11.10,  R19.7) Vomiting and diarrhea  Today we will arrange for IV fluids, Zofran and dexamethasone.  Because of dehydration we would recommend patient to be hospitalized for pain management as well as dehydration.    The patient is ready to learn, no apparent learning barriers were identified.  Diagnosis and treatment plans were explained to the patient. The patient expressed understanding of the content. The patient asked appropriate questions. The patient questions were answered to her satisfaction.    Chart documentation with Dragon Voice recognition Software. Although reviewed after completion, some words and grammatical errors may remain.    Again, thank you for allowing me to participate in the care of your patient.        Sincerely,        Ced Gunn MD

## 2018-10-26 ENCOUNTER — APPOINTMENT (OUTPATIENT)
Dept: RADIATION THERAPY | Facility: OUTPATIENT CENTER | Age: 68
End: 2018-10-26
Payer: COMMERCIAL

## 2018-10-26 ENCOUNTER — HOME INFUSION (PRE-WILLOW HOME INFUSION) (OUTPATIENT)
Dept: PHARMACY | Facility: CLINIC | Age: 68
End: 2018-10-26

## 2018-10-26 ENCOUNTER — OFFICE VISIT (OUTPATIENT)
Dept: RADIATION THERAPY | Facility: OUTPATIENT CENTER | Age: 68
End: 2018-10-26
Payer: COMMERCIAL

## 2018-10-26 ENCOUNTER — HOSPITAL (OUTPATIENT)
Dept: SPIRITUAL SERVICES | Facility: CLINIC | Age: 68
End: 2018-10-26

## 2018-10-26 DIAGNOSIS — C21.1 MALIGNANT NEOPLASM OF ANAL CANAL (H): Primary | ICD-10-CM

## 2018-10-26 LAB
ALBUMIN SERPL-MCNC: 2.4 G/DL (ref 3.4–5)
ALP SERPL-CCNC: 68 U/L (ref 40–150)
ALT SERPL W P-5'-P-CCNC: 17 U/L (ref 0–50)
ANION GAP SERPL CALCULATED.3IONS-SCNC: 11 MMOL/L (ref 3–14)
AST SERPL W P-5'-P-CCNC: 13 U/L (ref 0–45)
BASOPHILS # BLD AUTO: 0 10E9/L (ref 0–0.2)
BASOPHILS NFR BLD AUTO: 0 %
BILIRUB SERPL-MCNC: 0.7 MG/DL (ref 0.2–1.3)
BUN SERPL-MCNC: 11 MG/DL (ref 7–30)
CALCIUM SERPL-MCNC: 8.3 MG/DL (ref 8.5–10.1)
CHLORIDE SERPL-SCNC: 107 MMOL/L (ref 94–109)
CO2 SERPL-SCNC: 22 MMOL/L (ref 20–32)
CREAT SERPL-MCNC: 0.51 MG/DL (ref 0.52–1.04)
DIFFERENTIAL METHOD BLD: ABNORMAL
EOSINOPHIL # BLD AUTO: 0 10E9/L (ref 0–0.7)
EOSINOPHIL NFR BLD AUTO: 1 %
ERYTHROCYTE [DISTWIDTH] IN BLOOD BY AUTOMATED COUNT: 13.2 % (ref 10–15)
GFR SERPL CREATININE-BSD FRML MDRD: >90 ML/MIN/1.7M2
GLUCOSE SERPL-MCNC: 94 MG/DL (ref 70–99)
HCT VFR BLD AUTO: 29.6 % (ref 35–47)
HGB BLD-MCNC: 10.2 G/DL (ref 11.7–15.7)
IMM GRANULOCYTES # BLD: 0 10E9/L (ref 0–0.4)
IMM GRANULOCYTES NFR BLD: 0 %
LYMPHOCYTES # BLD AUTO: 0.1 10E9/L (ref 0.8–5.3)
LYMPHOCYTES NFR BLD AUTO: 8 %
MCH RBC QN AUTO: 32.7 PG (ref 26.5–33)
MCHC RBC AUTO-ENTMCNC: 34.5 G/DL (ref 31.5–36.5)
MCV RBC AUTO: 95 FL (ref 78–100)
MONOCYTES # BLD AUTO: 0.1 10E9/L (ref 0–1.3)
MONOCYTES NFR BLD AUTO: 7 %
NEUTROPHILS # BLD AUTO: 0.8 10E9/L (ref 1.6–8.3)
NEUTROPHILS NFR BLD AUTO: 84 %
NRBC # BLD AUTO: 0 10*3/UL
NRBC BLD AUTO-RTO: 0 /100
PLATELET # BLD AUTO: 83 10E9/L (ref 150–450)
POTASSIUM SERPL-SCNC: 4 MMOL/L (ref 3.4–5.3)
PROT SERPL-MCNC: 5.6 G/DL (ref 6.8–8.8)
RBC # BLD AUTO: 3.12 10E12/L (ref 3.8–5.2)
SODIUM SERPL-SCNC: 140 MMOL/L (ref 133–144)
WBC # BLD AUTO: 1 10E9/L (ref 4–11)

## 2018-10-26 PROCEDURE — 12000007 ZZH R&B INTERMEDIATE

## 2018-10-26 PROCEDURE — 33300001 ZZH OUT OF HOSPITAL SERVICE, RADIATION 333 OPNP

## 2018-10-26 PROCEDURE — 25000132 ZZH RX MED GY IP 250 OP 250 PS 637: Mod: GY | Performed by: NURSE PRACTITIONER

## 2018-10-26 PROCEDURE — 25000132 ZZH RX MED GY IP 250 OP 250 PS 637: Mod: GY | Performed by: FAMILY MEDICINE

## 2018-10-26 PROCEDURE — 80053 COMPREHEN METABOLIC PANEL: CPT | Performed by: FAMILY MEDICINE

## 2018-10-26 PROCEDURE — A9270 NON-COVERED ITEM OR SERVICE: HCPCS | Mod: GY | Performed by: FAMILY MEDICINE

## 2018-10-26 PROCEDURE — A9270 NON-COVERED ITEM OR SERVICE: HCPCS | Mod: GY | Performed by: NURSE PRACTITIONER

## 2018-10-26 PROCEDURE — 99232 SBSQ HOSP IP/OBS MODERATE 35: CPT | Performed by: INTERNAL MEDICINE

## 2018-10-26 PROCEDURE — 85025 COMPLETE CBC W/AUTO DIFF WBC: CPT | Performed by: FAMILY MEDICINE

## 2018-10-26 PROCEDURE — 25000128 H RX IP 250 OP 636: Performed by: FAMILY MEDICINE

## 2018-10-26 RX ADMIN — Medication 0.3 MG: at 00:09

## 2018-10-26 RX ADMIN — Medication: at 19:58

## 2018-10-26 RX ADMIN — OXYCODONE HYDROCHLORIDE 10 MG: 5 TABLET ORAL at 03:53

## 2018-10-26 RX ADMIN — OXYCODONE HYDROCHLORIDE 10 MG: 5 TABLET ORAL at 08:29

## 2018-10-26 RX ADMIN — Medication 0.5 MG: at 03:53

## 2018-10-26 RX ADMIN — OXYCODONE HYDROCHLORIDE 10 MG: 5 TABLET ORAL at 12:32

## 2018-10-26 RX ADMIN — OXYCODONE HYDROCHLORIDE 10 MG: 5 TABLET ORAL at 00:10

## 2018-10-26 RX ADMIN — ASPIRIN 81 MG 81 MG: 81 TABLET ORAL at 08:30

## 2018-10-26 RX ADMIN — SODIUM CHLORIDE, PRESERVATIVE FREE: 5 INJECTION INTRAVENOUS at 08:30

## 2018-10-26 RX ADMIN — PROCHLORPERAZINE EDISYLATE 5 MG: 5 INJECTION INTRAMUSCULAR; INTRAVENOUS at 22:04

## 2018-10-26 RX ADMIN — OXYCODONE HYDROCHLORIDE 10 MG: 5 TABLET ORAL at 16:47

## 2018-10-26 RX ADMIN — SODIUM CHLORIDE, PRESERVATIVE FREE: 5 INJECTION INTRAVENOUS at 17:58

## 2018-10-26 RX ADMIN — SILVER SULFADIAZINE: 10 CREAM TOPICAL at 08:30

## 2018-10-26 RX ADMIN — SODIUM CHLORIDE, PRESERVATIVE FREE: 5 INJECTION INTRAVENOUS at 00:10

## 2018-10-26 ASSESSMENT — ACTIVITIES OF DAILY LIVING (ADL)
ADLS_ACUITY_SCORE: 12

## 2018-10-26 NOTE — PLAN OF CARE
Problem: Pain, Acute (Adult)  Goal: Acceptable Pain Control/Comfort Level  Patient will demonstrate the desired outcomes by discharge/transition of care.  Outcome: Improving  Patient tolerated sitting up in chair for breakfast and radiation time at 10:30 am. She came back from radiation exhausted. Plan is for rest, repeat oxycodone 10 mg po at around 12:30 pm and at around 1300 cleanse perineal/perianal region, apply morphine gel to anal area. Poor po, placed a nutritional consult.   No diarrhea since admission to unit.

## 2018-10-26 NOTE — PROGRESS NOTES
"CLINICAL NUTRITION SERVICES  -  ASSESSMENT NOTE    RECOMMENDATIONS FOR MD/PROVIDER TO ORDER:   None   Recommendations Ordered by Registered Dietitian (RD):   Magic Cup TID   Future/Additional Recommendations:   Advance diet when indicated   Malnutrition:   Severe in the context of acute illness     REASON FOR ASSESSMENT  Jenise Calix is a 68 year old female seen by Registered Dietitian for Admission Nutrition Risk Screen - reduced oral intake over the last month.     NUTRITION HISTORY  Information obtained from chart and patient. Admitted with nausea, vomiting, and diarrhea induced by chemotherapy and radiation 2/2 hx of anal cancer. Spoke with patient who reports that her appetite has been very poor the past ~2 weeks. She reports her PO intake has been significantly less than usual. She eats lots of quick, easy food d/t low energy. She's tried Ensure/Boost and thinks they're okay, but doesn't drink them on a regular basis.     CURRENT NUTRITION ORDERS  Diet Order:     Full liquid      Current Intake/Tolerance:  Eating ~25% of meals.    PHYSICAL FINDINGS  Observed  Muscle Wasting - moderate temporal wasting  Obtained from Chart/Interdisciplinary Team  None noted    ANTHROPOMETRICS  Height: 5' 7\"  Weight: 156 lbs 15.48 oz  Body mass index is 24.58 kg/(m^2).  Weight Status:  Normal BMI  IBW: 135 lbs   % IBW: 116%  Weight History:   Wt Readings from Last 20 Encounters:   10/25/18 71.2 kg (156 lb 15.5 oz)   10/25/18 69.4 kg (153 lb 1.6 oz)   10/17/18 73.1 kg (161 lb 3.2 oz)   10/10/18 73.5 kg (162 lb)   10/10/18 73.5 kg (162 lb)   10/03/18 72.1 kg (159 lb)   09/26/18 74.8 kg (165 lb)   09/18/18 76.7 kg (169 lb)   09/14/18 76.7 kg (169 lb)   08/30/18 76.9 kg (169 lb 9.6 oz)   08/29/18 77.2 kg (170 lb 3.2 oz)   08/23/18 77.4 kg (170 lb 11.2 oz)   Patient has lost ~13 lbs in the last 1 - 2 months, which is 7.7% of her body weight.    LABS  Labs reviewed.    MEDICATIONS  Medications reviewed.    ASSESSED NUTRITION " NEEDS PER APPROVED PRACTICE GUIDELINES:  Dosing Weight 64 kg   Estimated Energy Needs: 1920 - 2240 kcals (30-35 Kcal/Kg)  Justification: repletion, cancer  Estimated Protein Needs: 77 - 96 grams protein (1.2-1.5 g pro/Kg)  Justification: repletion, cancer  Estimated Fluid Needs: 1 mL/kcal   Justification: maintenance    MALNUTRITION:  % Weight Loss:  > 5% in 1 month (severe malnutrition)  % Intake:  </= 50% for >/= 5 days (severe malnutrition)  Subcutaneous Fat Loss:  None observed  Muscle Loss:  Temporal region moderate depletion  Fluid Retention:  None noted    Malnutrition Diagnosis: Severe malnutrition  In Context of:  Acute illness or injury    NUTRITION DIAGNOSIS:  Inadequate oral intake related to decreased appetite, nausea, vomiting, and diarrhea as evidenced by reports significantly less PO intake the wast ~2 weeks.    NUTRITION INTERVENTIONS  Recommendations / Nutrition Prescription  Recommend advancing to regular diet when indicated. Will order Magic Cup TID.     Implementation  Nutrition education: No education needs assessed at this time  Medical Food Supplement    Nutrition Goals  Advance diet when indicated  PO intake to improve to >50% of meals and 100% of supplements within 1 - 2 days.    MONITORING AND EVALUATION:  Progress towards goals will be monitored and evaluated per protocol and Practice Guidelines    Emy Carty RDN, LD  Clinical Dietitian  [phone]

## 2018-10-26 NOTE — PROGRESS NOTES
[facility] PHYSICIANS  SPECIALIZING IN BREAKTHROUGHS  Radiation Oncology    On Treatment Visit Note      Jenise Calix      Date: 10/26/2018   MRN: [id number]   : 1950         Reason for Visit:  On Radiation Treatment Visit     Treatment Summary to Date   Anal canal/ Pelvic + Inguinal nodes  4500 cGy/ 5400 cGy            Subjective:   Patient was admitted to the hospital yesterday for pain management and dehydration.  CBC today 10/26/18 demonstrates WBC 1.0 (ANC 0.8), Plt 83, Hgb 10.2. Receiving aggressive pain regiment with IV dilaudid and oral pain meds. Feels slightly better this AM with IVF hydration. Tolerating softer foods such as cream of wheat.     Nursing ROS:           Objective:   There were no vitals taken for this visit.  No apparent distress, sitting in wheelchair.  Skin - Significant moist desquamation in dimitris-anal area and vaginal introitus (examined yesterday prior to radiation treatment)  Anal canal - protruding mass significantly decreased since starting RT.     Labs:  Lab Results   Component Value Date    WBC 1.0 10/26/2018     Lab Results   Component Value Date    RBC 3.12 10/26/2018     Lab Results   Component Value Date    HGB 10.2 10/26/2018     Lab Results   Component Value Date    HCT 29.6 10/26/2018     No components found for: MCT  Lab Results   Component Value Date    MCV 95 10/26/2018     Lab Results   Component Value Date    MCH 32.7 10/26/2018     Lab Results   Component Value Date    MCHC 34.5 10/26/2018     Lab Results   Component Value Date    RDW 13.2 10/26/2018     Lab Results   Component Value Date    PLT 83 10/26/2018                 Assessment: 68F with  cT3-4N1 squamous cell carcinoma of the anal canal undergoing definitive CRT. Admitted to the hospital for pain management, dehydration, and neutropenia.     Plan:   1. Continue current therapy.  Will hold RT if ANC < 500 and/or Plt < 50k.  2. Continue pain management, skin care, and hydration per  inpatient team.    Mosaiq chart and setup information reviewed  Ports checked      Edmundo Iqbal MD

## 2018-10-26 NOTE — CONSULTS
Care Transition Initial Assessment - RN  Reason For Consult: discharge planning   Met with: Patient.    DATA   Active Problems:    Chemotherapy induced nausea and vomiting           ASSESSMENT  Cognitive Status: awake, alert and oriented.             Lives With: alone  Living Arrangements: mobile home     Description of Support System: Supportive   Who is your support system?: Other (specify), Neighbor   Support Assessment: Lacks adequate emotional support   Insurance Concerns: No Insurance issues identified      This writer met with pt, introduced self and role. Discussed discharge planning and Medicare guidelines in regards to home care and SNF benefits. Patient lives alone an hour away from here. Her   two years ago from cancer and her adult children live in [place]. She has been living independently. Patient states volunteers from the [employer] and the [employer] drive her back and forth to treatments. Patient denies any needs on discharge at this time.    PLAN    Home; CTS to follow      Discharge Planner   Discharge Plans in progress: Home; CTS to follow  Barriers to discharge plan: clinical improvement  Follow up plan: per MD       Entered by: MARGARET NORTH 10/26/2018 12:09 PM         Aby Frederick, MSN, RN, Care Coordinator  [facility] [phone]  [facility] [phone]

## 2018-10-26 NOTE — LETTER
10/26/2018      RE: Jenise Calix  [address] [facility] [address]       [facility] PHYSICIANS  SPECIALIZING IN BREAKTHROUGHS  Radiation Oncology    On Treatment Visit Note      Jenise Calix      Date: 10/26/2018   MRN: [id number]   : 1950         Reason for Visit:  On Radiation Treatment Visit     Treatment Summary to Date   Anal canal/ Pelvic + Inguinal nodes  4500 cGy/ 5400 cGy            Subjective:   Patient was admitted to the hospital yesterday for pain management and dehydration.  CBC today 10/26/18 demonstrates WBC 1.0 (ANC 0.8), Plt 83, Hgb 10.2. Receiving aggressive pain regiment with IV dilaudid and oral pain meds. Feels slightly better this AM with IVF hydration. Tolerating softer foods such as cream of wheat.     Nursing ROS:           Objective:   There were no vitals taken for this visit.  No apparent distress, sitting in wheelchair.  Skin - Significant moist desquamation in dimitris-anal area and vaginal introitus (examined yesterday prior to radiation treatment)  Anal canal - protruding mass significantly decreased since starting RT.     Labs:  Lab Results   Component Value Date    WBC 1.0 10/26/2018     Lab Results   Component Value Date    RBC 3.12 10/26/2018     Lab Results   Component Value Date    HGB 10.2 10/26/2018     Lab Results   Component Value Date    HCT 29.6 10/26/2018     No components found for: MCT  Lab Results   Component Value Date    MCV 95 10/26/2018     Lab Results   Component Value Date    MCH 32.7 10/26/2018     Lab Results   Component Value Date    MCHC 34.5 10/26/2018     Lab Results   Component Value Date    RDW 13.2 10/26/2018     Lab Results   Component Value Date    PLT 83 10/26/2018                 Assessment: 68F with  cT3-4N1 squamous cell carcinoma of the anal canal undergoing definitive CRT. Admitted to the hospital for pain management, dehydration, and neutropenia.     Plan:   1. Continue current therapy.  Will hold RT if ANC <  500 and/or Plt < 50k.  2. Continue pain management, skin care, and hydration per inpatient team.    Mosaiq chart and setup information reviewed  Ports checked      Edmundo Iqbal MD

## 2018-10-26 NOTE — MR AVS SNAPSHOT
After Visit Summary   10/26/2018    Jenise Calix    MRN: [id number]           Patient Information     Date Of Birth          1950        Visit Information        Provider Department      10/26/2018 10:45 AM Edmundo Iqbal MD [facility]        Today's Diagnoses     Malignant neoplasm of anal canal (H)    -  1       Follow-ups after your visit        Your next 10 appointments already scheduled     Oct 29, 2018 10:30 AM CDT   Linear Accelerator with Lr [facility] Rad Tech   Rad[facility] Center ([facility])    [address], [address]  [facility] [address]   [phone]            Oct 30, 2018 10:30 AM CDT   Linear Accelerator with Lr [facility] Rad [facility]   Rad[facility] Center ([facility])    [address], [address]  [facility] [address]   [phone]            Oct 31, 2018 10:30 AM CDT   Linear Accelerator with Lr [facility] Rad [facility]   Rad[facility] Center ([facility])    [address], [address]  [facility] [address]   [phone]            Oct 31, 2018 10:45 AM CDT   ON TREATMENT VISIT with Edmundo Iqbal MD   [facility] ([facility])    [address], [address]  [facility] [address]   [phone]            Nov 01, 2018 12:00 PM CDT   Linear Accelerator with Lr [facility] Rad [facility]   Rad[facility] ([facility])    [address], [address]  [facility] [address]   [phone]              Future tests that were ordered for you today     Open Standing Orders        Priority Remaining Interval Expires Ordered    Activity: Up with assist Routine [id number] PRN  10/25/2018    Platelet count Routine 12/12 EVERY THREE DAYS  10/25/2018    Creatinine Routine 12/12 EVERY THREE DAYS  10/25/2018    Notify provider Routine [id number] PRN  10/25/2018            Who to contact     Please call your clinic at [phone] to:    Ask questions about your health    Make or cancel  appointments    Discuss your medicines    Learn about your test results    Speak to your doctor            Additional Information About Your Visit        Care EveryWhere ID     This is your Care EveryWhere ID. This could be used by other organizations to access your [facility] medical records  [id number]         Blood Pressure from Last 3 Encounters:   10/26/18 122/45   10/25/18 110/68   10/22/18 125/50    Weight from Last 3 Encounters:   10/25/18 71.2 kg (156 lb 15.5 oz)   10/25/18 69.4 kg (153 lb 1.6 oz)   10/17/18 73.1 kg (161 lb 3.2 oz)              Today, you had the following     No orders found for display         Today's Medication Changes      Notice     This visit is during an admission. Changes to the med list made in this visit will be reflected in the After Visit Summary of the admission.             Primary Care Provider Fax #    Physician No Ref-Primary [phone]       No address on file        Equal Access to Services     AGUSTIN HAMILTON : Radha Brown, denise mon, yolanda kaalmaadam moore, katina whitaker . So [facility] [phone].    ATENCIÓN: Si habla español, tiene a sanchez disposición servicios gratuitos de asistencia lingüística. Llame al [phone].    We comply with applicable federal civil rights laws and [place] laws. We do not discriminate on the basis of race, color, national origin, age, disability, sex, sexual orientation, or gender identity.            Thank you!     Thank you for choosing [facility]  for your care. Our goal is always to provide you with excellent care. Hearing back from our patients is one way we can continue to improve our services. Please take a few minutes to complete the written survey that you may receive in the mail after your visit with us. Thank you!             Your Updated Medication List - Protect others around you: Learn how to safely use, store and throw away your medicines at  www.disposemymeds.org.      Notice     This visit is during an admission. Changes to the med list made in this visit will be reflected in the After Visit Summary of the admission.

## 2018-10-26 NOTE — PROGRESS NOTES
Patient had perineal care gently performed by staff, silvadene cream applied to perianal area and aquaphor cream to radiation burn areas per her home regimen. She had IV dilaudid once for pain and it helped. Does not want a desai catheter at this time due to risks for infection, has options for pain management. She plans to go to radiation tomorrow at 10:30 am.

## 2018-10-26 NOTE — PROGRESS NOTES
Pt A/O x4. Pain not well controlled; does not admit the severity of pain. With conversation pt has increased understanding that her pain is described as discomfort and she does not need to try and handle it. Advised pt it is a different form/type of pain but discomfort with the burn is her pain. Willing to take meds and has some relief.

## 2018-10-26 NOTE — PROGRESS NOTES
Skin affirmation note    Admitting nurse completed full skin assessment, Jose score and Jose interventions. This writer agrees with the initial skin assessment findings.

## 2018-10-26 NOTE — PROGRESS NOTES
"SPIRITUAL HEALTH SERVICES  SPIRITUAL ASSESSMENT Progress Note  [facility] - Med/Surg    Jenise's responses on the Oncology Distress Screen had triggered a referral to Spiritual Care.  I had planned to call her but realized that she was hospitalized here at [facility].  I stopped down to introduce self and offer support on her cancer journey.  She quickly indicated that she had a \"great Zoroastrianism and \" where she lives.  She spoke of the security that comes in knowing that there are others who can provide support, give rides, and whatever other needs arise, they are there.  I affirmed that strength of a good Zoroastrianism and offered to be available for support by request.  No follow up planned at this time.    Chau Looney M.A., [facility]  Staff   [facility]  Office: [phone]  Cell: [phone]  Pager [phone]    "

## 2018-10-26 NOTE — PROGRESS NOTES
[facility] Medicine Progress Note  Date of Service: 10/26/2018    Assessment & Plan   Jenise Calix is a 68 year old female who presented on 10/25/2018 with nausea, vomiting, diarrhea and anal pain due to chemoradiation therapy.      Chemotherapy induced nausea and vomiting and diarrhea   Much improved with hydration. No stools since admit.    - continue IVF at 100       Squamous cell cancer of the anus   Undergoing radiation and has radiation dermatitis with pain. Some improvement but still uncomfortable.     - continue oxycodone 10 mg and hydromorphone for breakthrough   - silvadine and lidocaine topical   - starting morphine topical once available      Borderline neutropenia    With ANC of 0.8. No fever.    - following       Pancytopenia due to chemotherapy    Thrombocytopenia, mild, chemo related   - following      History of paroxysmal atrial fibrillation, on aspirin.       DVT Prophylaxis: aspirin presently, watch platelets  Code Status: Full Code    Lines: PIV   Robledo catheter: None    Discussion: Clinically improving but still with significant pain    Disposition: Anticipate discharge 1-2 days though patient may now have inappropriate expectations to stay into next week.     Attestation:  Total time: 30 minutes    Enmanuel Jerez MD       Interval History   Nausea improved. No stools since admission. Anal pain a litte better controlled. No chest pain, shortness of breath. Abdominal pain.     Patient got the impression from Dr. Iqbal that she'd be in the hospital until next week.     Physical Exam   Temp:  [97.3  F (36.3  C)-98.3  F (36.8  C)] 98.3  F (36.8  C)  Pulse:  [61] 61  Resp:  [16-18] 18  BP: (114-125)/(45-54) 125/46  SpO2:  [97 %-99 %] 99 %    Weights:   Vitals:    10/25/18 1412   Weight: 71.2 kg (156 lb 15.5 oz)    Body mass index is 24.58 kg/(m^2).    Constitutional: alert and oriented, nontoxic, appears mild uncomfortable otherwise NAD  CV:  Regular  Respiratory: CTA bilaterally  GI: Soft, non-tender   Skin: Warm and dry    Data     Recent Labs  Lab 10/26/18  0530 10/25/18  1620 10/25/18  1145   WBC 1.0*  --  1.3*   HGB 10.2*  --  11.8   MCV 95  --  96   PLT 83* 93* 100*     --  137   POTASSIUM 4.0  --  3.9   CHLORIDE 107  --  103   CO2 22  --  24   BUN 11  --  13   CR 0.51* 0.52 0.57   ANIONGAP 11  --  10   BRUNO 8.3*  --  8.8   GLC 94  --  85   ALBUMIN 2.4*  --  2.9*   PROTTOTAL 5.6*  --  6.6*   BILITOTAL 0.7  --  1.4*   ALKPHOS 68  --  86   ALT 17  --  26   AST 13  --  14         Recent Labs  Lab 10/26/18  0530 10/25/18  1145   GLC 94 85        Unresulted Labs Ordered in the Past 30 Days of this Admission     No orders found for last 61 day(s).           Imaging  No results found for this or any previous visit (from the past 24 hour(s)).     I reviewed all new labs and imaging results over the last 24 hours. I personally reviewed no images or EKG's today.    Medications     sodium chloride 125 mL/hr at 10/26/18 1758       aspirin  81 mg Oral Daily     silver sulfADIAZINE   Topical BID   Reviewed meds    Enmanuel Jerez MD

## 2018-10-26 NOTE — PROGRESS NOTES
"Patient pain controlled with oxycodone 10 mg administered every four hours.  She has had no loose stools this shift, denies nausea. Appetite is fair, she did not want to advance her diet. The dietician saw her today and added a \"protein something\".  She had her radiation treatment and was \"exhausted\" afterwards. Writer washed her perineal/perianal this afternoon. She has radiation burns pubic region,groin, inner and upper thigh and buttocks. The skin in groin and inner buttocks is sloughing off and extremely tender/painful when gently washed. Perianal region also macerated, red and painful. Writer did apply the silvadene to perianal area and aquaphor to red/burn areas. Pharmacy is waiting for delivery of morphine gel to apply to anal area prn. Up in chair for supper.   "

## 2018-10-27 LAB
BASOPHILS # BLD AUTO: 0 10E9/L (ref 0–0.2)
BASOPHILS NFR BLD AUTO: 0 %
DIFFERENTIAL METHOD BLD: ABNORMAL
EOSINOPHIL # BLD AUTO: 0 10E9/L (ref 0–0.7)
EOSINOPHIL NFR BLD AUTO: 4 %
ERYTHROCYTE [DISTWIDTH] IN BLOOD BY AUTOMATED COUNT: 13.3 % (ref 10–15)
HCT VFR BLD AUTO: 28.1 % (ref 35–47)
HGB BLD-MCNC: 9.7 G/DL (ref 11.7–15.7)
LYMPHOCYTES # BLD AUTO: 0 10E9/L (ref 0.8–5.3)
LYMPHOCYTES NFR BLD AUTO: 4 %
MCH RBC QN AUTO: 33.2 PG (ref 26.5–33)
MCHC RBC AUTO-ENTMCNC: 34.5 G/DL (ref 31.5–36.5)
MCV RBC AUTO: 96 FL (ref 78–100)
MONOCYTES # BLD AUTO: 0.1 10E9/L (ref 0–1.3)
MONOCYTES NFR BLD AUTO: 9 %
NEUTROPHILS # BLD AUTO: 0.6 10E9/L (ref 1.6–8.3)
NEUTROPHILS NFR BLD AUTO: 83 %
PLATELET # BLD AUTO: 68 10E9/L (ref 150–450)
PLATELET # BLD EST: ABNORMAL 10*3/UL
RBC # BLD AUTO: 2.92 10E12/L (ref 3.8–5.2)
RBC MORPH BLD: NORMAL
WBC # BLD AUTO: 0.7 10E9/L (ref 4–11)

## 2018-10-27 PROCEDURE — 85025 COMPLETE CBC W/AUTO DIFF WBC: CPT | Performed by: INTERNAL MEDICINE

## 2018-10-27 PROCEDURE — A9270 NON-COVERED ITEM OR SERVICE: HCPCS | Mod: GY | Performed by: FAMILY MEDICINE

## 2018-10-27 PROCEDURE — 25000132 ZZH RX MED GY IP 250 OP 250 PS 637: Mod: GY | Performed by: FAMILY MEDICINE

## 2018-10-27 PROCEDURE — 99231 SBSQ HOSP IP/OBS SF/LOW 25: CPT | Performed by: INTERNAL MEDICINE

## 2018-10-27 PROCEDURE — 25000128 H RX IP 250 OP 636: Performed by: FAMILY MEDICINE

## 2018-10-27 PROCEDURE — 12000000 ZZH R&B MED SURG/OB

## 2018-10-27 PROCEDURE — 25000128 H RX IP 250 OP 636: Performed by: INTERNAL MEDICINE

## 2018-10-27 RX ADMIN — OXYCODONE HYDROCHLORIDE 10 MG: 5 TABLET ORAL at 00:25

## 2018-10-27 RX ADMIN — ASPIRIN 81 MG 81 MG: 81 TABLET ORAL at 09:03

## 2018-10-27 RX ADMIN — PROCHLORPERAZINE EDISYLATE 5 MG: 5 INJECTION INTRAMUSCULAR; INTRAVENOUS at 07:46

## 2018-10-27 RX ADMIN — SODIUM CHLORIDE, PRESERVATIVE FREE: 5 INJECTION INTRAVENOUS at 14:12

## 2018-10-27 RX ADMIN — Medication: at 03:46

## 2018-10-27 RX ADMIN — SILVER SULFADIAZINE: 10 CREAM TOPICAL at 14:02

## 2018-10-27 RX ADMIN — SODIUM CHLORIDE, PRESERVATIVE FREE: 5 INJECTION INTRAVENOUS at 03:57

## 2018-10-27 RX ADMIN — OXYCODONE HYDROCHLORIDE 10 MG: 5 TABLET ORAL at 16:27

## 2018-10-27 RX ADMIN — OXYCODONE HYDROCHLORIDE 10 MG: 5 TABLET ORAL at 22:58

## 2018-10-27 RX ADMIN — OXYCODONE HYDROCHLORIDE 10 MG: 5 TABLET ORAL at 09:05

## 2018-10-27 ASSESSMENT — ACTIVITIES OF DAILY LIVING (ADL)
ADLS_ACUITY_SCORE: 12

## 2018-10-27 NOTE — PROGRESS NOTES
DATE:  10/27/2018   TIME OF RECEIPT FROM LAB:  0754  LAB TEST:  WBC  LAB VALUE:  0.7  RESULTS GIVEN WITH READ-BACK TO (PROVIDER):  Enmanuel Jerez MD  TIME LAB VALUE REPORTED TO PROVIDER:   0759

## 2018-10-27 NOTE — PLAN OF CARE
Problem: Patient Care Overview  Goal: Plan of Care/Patient Progress Review  Outcome: No Change  Patient alert and cooperative. Patient reported she had an increase in appetite; diet advanced to regular. Patient reported some nausea this morning at start of shift; PRN compazine given. Patient denied any nausea the rest of the day. Patient up with stand by assist. Vitals signs stable; on room air and afebrile. No BM. Patient's  visited and patient spoke with her sister on the phone. Patient is in good spirits and encouragement given.       Problem: Pain, Acute (Adult)  Goal: Acceptable Pain Control/Comfort Level  Patient will demonstrate the desired outcomes by discharge/transition of care.   Outcome: No Change  Patient reported pain is managed with PRN oxycodone. Patient was able to get rest this mid-morning and has been pleasant this afternoon and evening. Patient took a shower today and dimitris/anal area was cleansed. Patient stated the silvadene cream felt good when it was applied after the shower. Patient will request morphine gel as needed.

## 2018-10-27 NOTE — PLAN OF CARE
Problem: Pain, Acute (Adult)  Goal: Identify Related Risk Factors and Signs and Symptoms  Related risk factors and signs and symptoms are identified upon initiation of Human Response Clinical Practice Guideline (CPG).   Outcome: Improving  Dimitris rectal pain continues.  Morphine Gel started last evening and has had 2 applications overnight with good relief.  Also  Had 10 mg oxycodone.  Some nausea with dry heaving and compazine given.  Up to bathroom with SBA, gentle dimitris care given.  Urine is dark lois.  Uses call light for needs.  VSS.

## 2018-10-27 NOTE — PROGRESS NOTES
[facility] Medicine Progress Note  Date of Service: 10/27/2018    Assessment & Plan   Jenise Calix is a 68 year old female who presented on 10/25/2018 with nausea, vomiting, diarrhea and anal pain due to chemoradiation therapy.      Chemotherapy induced nausea and vomiting and diarrhea   Much improved with hydration. No stools since admit. Diet advancing.   - decrease IVF to 50       Squamous cell cancer of the anus   Undergoing radiation and has radiation dermatitis with pain. Pain control improving with addition of topical morphine.      - continue oxycodone 10 mg and hydromorphone for breakthrough   - silvadine and lidocaine topical   - continue morphine topical       Borderline neutropenia    With ANC of 0.6. No fever.    - following       Pancytopenia due to chemotherapy    Thrombocytopenia, mild, chemo related. Last chemo finished infusion 5 days ago.    - following      History of paroxysmal atrial fibrillation, on aspirin.       DVT Prophylaxis: aspirin presently, watch platelets  Code Status: Full Code    Lines: PIV   Robledo catheter: None      Discussion: Improving.     Disposition: Anticipate discharge Monday      Attestation:  Total time: 25 minutes    Enmanuel Jerez MD  [facility] Medicine        Interval History   Pain control improving though still an issue. Eating more, no nausea. No chest pain shortness of breath. [place] hour away and relies on volunteers for rides. Nxt available is Tuesday.     Physical Exam   Temp:  [97.8  F (36.6  C)-99.3  F (37.4  C)] 99.2  F (37.3  C)  Pulse:  [58-72] 72  Resp:  [16-18] 16  BP: (103-136)/(50-71) 121/51  SpO2:  [96 %-99 %] 99 %    Weights:   Vitals:    10/25/18 1412   Weight: 71.2 kg (156 lb 15.5 oz)    Body mass index is 24.58 kg/(m^2).    Constitutional: alert and oriented, brighter  CV: Regular  Respiratory: CTA bilaterally  GI: Soft, non-tender   Skin: Warm and dry    Data     Recent Labs  Lab 10/27/18  9354  10/26/18  0530 10/25/18  1620 10/25/18  1145   WBC 0.7* 1.0*  --  1.3*   HGB 9.7* 10.2*  --  11.8   MCV 96 95  --  96   PLT 68* 83* 93* 100*   NA  --  140  --  137   POTASSIUM  --  4.0  --  3.9   CHLORIDE  --  107  --  103   CO2  --  22  --  24   BUN  --  11  --  13   CR  --  0.51* 0.52 0.57   ANIONGAP  --  11  --  10   BRUNO  --  8.3*  --  8.8   GLC  --  94  --  85   ALBUMIN  --  2.4*  --  2.9*   PROTTOTAL  --  5.6*  --  6.6*   BILITOTAL  --  0.7  --  1.4*   ALKPHOS  --  68  --  86   ALT  --  17  --  26   AST  --  13  --  14         Recent Labs  Lab 10/26/18  0530 10/25/18  1145   GLC 94 85        Unresulted Labs Ordered in the Past 30 Days of this Admission     No orders found from 8/26/2018 to 10/26/2018.           Imaging: No results found for this or any previous visit (from the past 24 hour(s)).     I reviewed all new labs and imaging results over the last 24 hours. I personally reviewed no images or EKG's today.    Medications     sodium chloride 100 mL/hr at 10/27/18 1412       aspirin  81 mg Oral Daily     silver sulfADIAZINE   Topical BID   Revuewed meds    Enmanuel Jerez MD  [facility] Medicine

## 2018-10-28 ENCOUNTER — APPOINTMENT (OUTPATIENT)
Dept: GENERAL RADIOLOGY | Facility: CLINIC | Age: 68
DRG: 393 | End: 2018-10-28
Attending: INTERNAL MEDICINE
Payer: MEDICARE

## 2018-10-28 LAB
ALBUMIN UR-MCNC: 30 MG/DL
APPEARANCE UR: ABNORMAL
BACTERIA #/AREA URNS HPF: ABNORMAL /HPF
BASOPHILS # BLD AUTO: 0 10E9/L (ref 0–0.2)
BASOPHILS NFR BLD AUTO: 0 %
BILIRUB UR QL STRIP: NEGATIVE
COLOR UR AUTO: YELLOW
CREAT SERPL-MCNC: 0.56 MG/DL (ref 0.52–1.04)
DIFFERENTIAL METHOD BLD: ABNORMAL
EOSINOPHIL # BLD AUTO: 0 10E9/L (ref 0–0.7)
EOSINOPHIL NFR BLD AUTO: 0 %
ERYTHROCYTE [DISTWIDTH] IN BLOOD BY AUTOMATED COUNT: 13.3 % (ref 10–15)
GFR SERPL CREATININE-BSD FRML MDRD: >90 ML/MIN/1.7M2
GLUCOSE UR STRIP-MCNC: NEGATIVE MG/DL
HCT VFR BLD AUTO: 25.8 % (ref 35–47)
HGB BLD-MCNC: 8.9 G/DL (ref 11.7–15.7)
HGB UR QL STRIP: ABNORMAL
IMM GRANULOCYTES # BLD: 0 10E9/L (ref 0–0.4)
IMM GRANULOCYTES NFR BLD: 0 %
KETONES UR STRIP-MCNC: 5 MG/DL
LACTATE BLD-SCNC: 0.5 MMOL/L (ref 0.7–2)
LEUKOCYTE ESTERASE UR QL STRIP: ABNORMAL
LYMPHOCYTES # BLD AUTO: 0.1 10E9/L (ref 0.8–5.3)
LYMPHOCYTES NFR BLD AUTO: 14 %
MCH RBC QN AUTO: 33.3 PG (ref 26.5–33)
MCHC RBC AUTO-ENTMCNC: 34.5 G/DL (ref 31.5–36.5)
MCV RBC AUTO: 97 FL (ref 78–100)
MONOCYTES # BLD AUTO: 0.1 10E9/L (ref 0–1.3)
MONOCYTES NFR BLD AUTO: 28 %
MUCOUS THREADS #/AREA URNS LPF: PRESENT /LPF
NEUTROPHILS # BLD AUTO: 0.3 10E9/L (ref 1.6–8.3)
NEUTROPHILS NFR BLD AUTO: 58 %
NITRATE UR QL: NEGATIVE
NRBC # BLD AUTO: 0 10*3/UL
NRBC BLD AUTO-RTO: 0 /100
PH UR STRIP: 5 PH (ref 5–7)
PLATELET # BLD AUTO: 48 10E9/L (ref 150–450)
PLATELET # BLD EST: ABNORMAL 10*3/UL
PROCALCITONIN SERPL-MCNC: <0.05 NG/ML
RBC # BLD AUTO: 2.67 10E12/L (ref 3.8–5.2)
RBC #/AREA URNS AUTO: 31 /HPF (ref 0–2)
RBC MORPH BLD: NORMAL
SOURCE: ABNORMAL
SP GR UR STRIP: 1.02 (ref 1–1.03)
SQUAMOUS #/AREA URNS AUTO: 3 /HPF (ref 0–1)
UROBILINOGEN UR STRIP-MCNC: 0 MG/DL (ref 0–2)
WBC # BLD AUTO: 0.5 10E9/L (ref 4–11)
WBC #/AREA URNS AUTO: >182 /HPF (ref 0–5)
WBC CLUMPS #/AREA URNS HPF: PRESENT /HPF

## 2018-10-28 PROCEDURE — 25000128 H RX IP 250 OP 636: Performed by: INTERNAL MEDICINE

## 2018-10-28 PROCEDURE — 83605 ASSAY OF LACTIC ACID: CPT | Performed by: INTERNAL MEDICINE

## 2018-10-28 PROCEDURE — 87040 BLOOD CULTURE FOR BACTERIA: CPT | Performed by: INTERNAL MEDICINE

## 2018-10-28 PROCEDURE — 25000125 ZZHC RX 250: Performed by: INTERNAL MEDICINE

## 2018-10-28 PROCEDURE — A9270 NON-COVERED ITEM OR SERVICE: HCPCS | Mod: GY | Performed by: FAMILY MEDICINE

## 2018-10-28 PROCEDURE — 99233 SBSQ HOSP IP/OBS HIGH 50: CPT | Performed by: INTERNAL MEDICINE

## 2018-10-28 PROCEDURE — 84145 PROCALCITONIN (PCT): CPT | Performed by: INTERNAL MEDICINE

## 2018-10-28 PROCEDURE — 81001 URINALYSIS AUTO W/SCOPE: CPT | Performed by: INTERNAL MEDICINE

## 2018-10-28 PROCEDURE — 12000007 ZZH R&B INTERMEDIATE

## 2018-10-28 PROCEDURE — 71045 X-RAY EXAM CHEST 1 VIEW: CPT

## 2018-10-28 PROCEDURE — 85025 COMPLETE CBC W/AUTO DIFF WBC: CPT | Performed by: FAMILY MEDICINE

## 2018-10-28 PROCEDURE — 87040 BLOOD CULTURE FOR BACTERIA: CPT | Performed by: FAMILY MEDICINE

## 2018-10-28 PROCEDURE — 87086 URINE CULTURE/COLONY COUNT: CPT | Performed by: FAMILY MEDICINE

## 2018-10-28 PROCEDURE — 25000132 ZZH RX MED GY IP 250 OP 250 PS 637: Mod: GY | Performed by: FAMILY MEDICINE

## 2018-10-28 PROCEDURE — 36415 COLL VENOUS BLD VENIPUNCTURE: CPT | Performed by: INTERNAL MEDICINE

## 2018-10-28 PROCEDURE — 25000128 H RX IP 250 OP 636: Performed by: FAMILY MEDICINE

## 2018-10-28 PROCEDURE — 93005 ELECTROCARDIOGRAM TRACING: CPT

## 2018-10-28 PROCEDURE — 82565 ASSAY OF CREATININE: CPT | Performed by: FAMILY MEDICINE

## 2018-10-28 RX ORDER — VANCOMYCIN HYDROCHLORIDE 1 G/200ML
1000 INJECTION, SOLUTION INTRAVENOUS EVERY 12 HOURS
Status: DISCONTINUED | OUTPATIENT
Start: 2018-10-28 | End: 2018-10-31

## 2018-10-28 RX ADMIN — CEFEPIME HYDROCHLORIDE 2 G: 2 INJECTION, POWDER, FOR SOLUTION INTRAVENOUS at 12:01

## 2018-10-28 RX ADMIN — ACETAMINOPHEN 650 MG: 325 TABLET, FILM COATED ORAL at 15:03

## 2018-10-28 RX ADMIN — SODIUM CHLORIDE, PRESERVATIVE FREE: 5 INJECTION INTRAVENOUS at 20:11

## 2018-10-28 RX ADMIN — SODIUM CHLORIDE, PRESERVATIVE FREE: 5 INJECTION INTRAVENOUS at 02:57

## 2018-10-28 RX ADMIN — OXYCODONE HYDROCHLORIDE 10 MG: 5 TABLET ORAL at 02:45

## 2018-10-28 RX ADMIN — SILVER SULFADIAZINE: 10 CREAM TOPICAL at 09:28

## 2018-10-28 RX ADMIN — SODIUM CHLORIDE, PRESERVATIVE FREE: 5 INJECTION INTRAVENOUS at 11:16

## 2018-10-28 RX ADMIN — PROCHLORPERAZINE EDISYLATE 5 MG: 5 INJECTION INTRAMUSCULAR; INTRAVENOUS at 21:43

## 2018-10-28 RX ADMIN — FILGRASTIM 480 MCG: 480 INJECTION, SOLUTION INTRAVENOUS; SUBCUTANEOUS at 12:04

## 2018-10-28 RX ADMIN — ACETAMINOPHEN 650 MG: 325 TABLET, FILM COATED ORAL at 10:21

## 2018-10-28 RX ADMIN — SILVER SULFADIAZINE: 10 CREAM TOPICAL at 02:16

## 2018-10-28 RX ADMIN — OXYCODONE HYDROCHLORIDE 10 MG: 5 TABLET ORAL at 10:21

## 2018-10-28 RX ADMIN — VANCOMYCIN HYDROCHLORIDE 1000 MG: 1 INJECTION, SOLUTION INTRAVENOUS at 12:32

## 2018-10-28 RX ADMIN — Medication: at 02:15

## 2018-10-28 RX ADMIN — ACETAMINOPHEN 650 MG: 325 TABLET, FILM COATED ORAL at 00:05

## 2018-10-28 RX ADMIN — Medication: at 14:36

## 2018-10-28 RX ADMIN — SODIUM CHLORIDE, POTASSIUM CHLORIDE, SODIUM LACTATE AND CALCIUM CHLORIDE 1000 ML: 600; 310; 30; 20 INJECTION, SOLUTION INTRAVENOUS at 10:14

## 2018-10-28 RX ADMIN — OXYCODONE HYDROCHLORIDE 10 MG: 5 TABLET ORAL at 18:09

## 2018-10-28 RX ADMIN — CEFEPIME HYDROCHLORIDE 2 G: 2 INJECTION, POWDER, FOR SOLUTION INTRAVENOUS at 20:07

## 2018-10-28 RX ADMIN — LIDOCAINE HYDROCHLORIDE 10 ML: 20 JELLY TOPICAL at 10:37

## 2018-10-28 ASSESSMENT — ACTIVITIES OF DAILY LIVING (ADL)
ADLS_ACUITY_SCORE: 12

## 2018-10-28 NOTE — PROGRESS NOTES
[facility] Medicine Progress Note  Date of Service: 10/28/2018    Assessment & Plan   Jenise Calix is a 68 year old female who presented on 10/25/2018 with nausea, vomiting, diarrhea and anal pain due to chemoradiation therapy.      Chemotherapy induced nausea and vomiting and diarrhea   Much improved with hydration. No stools since admit. Diet advancing.       Squamous cell cancer of the anus   Undergoing radiation and has radiation dermatitis with significant pain. Pain control improving with addition of topical morphine.      - continue oxycodone 10 mg and hydromorphone for breakthrough   - silvadine and lidocaine topical   - continue morphine topical       Neutropenic fever   ANC 0.3 10/28. Has not been neutropenic with past treatment, but did receive 5FU and mitomycin which can each suppress bone marrow. Spiked fever late 10/27 to 101.5 and reported to me this morning. Some tachycardia. No localizing symptoms of infection. LA last night 0.5.    - blood culture x 2   - pCXR   - UA if able to get cleanly (area is coated with creams) but ok if not able to get as we are treating anyway   - start Neupogen today 10/28      Pancytopenia due to chemotherapy    Thrombocytopenia, mild, chemo related.    Last chemo finished infusion 10/22.    Hgb not needing transfusion at this point. Platelets 48k and will hold aspirin. Neutropenic as above.       Paroxysmal atrial fibrillation    Tachycardia, possible atrial fibrillation with RVR 10/28   On aspirin at baseline.    Having episodes of tachycardia off and on today 10/28. Fairly regular on ECG with rate 170 but could also be atrial fibrillation. Likely triggered by fever. No chest pain, hypotension, shortness of breath    - telemetry   - fluid bolus   - treat fever   - ma need rate control if continues      DVT Prophylaxis: aspirin presently, watch platelets  Code Status: Full Code    Lines: PIV   Robledo catheter:  "None      Discussion: had been improving but now fever and feeling worse    Disposition: Anticipate discharge once fever resolves and ANC > 500, likely 2 or more days.    Attestation:  Total time: 40 minutes    Enmanuel Jerez MD  [facility] Medicine        Interval History   Fever last night. Feels \"not good\" today with some return of nausea after eating, feeling much more tired. Minimal cough. No HA. No abdominal pain. Has terrible dysuria from the radiation injury.     Physical Exam   Temp:  [98.6  F (37  C)-101.5  F (38.6  C)] 98.6  F (37  C)  Pulse:  [] 130  Resp:  [16] 16  BP: (121-136)/(49-78) 136/78  SpO2:  [94 %-99 %] 96 %    Weights:   Vitals:    10/25/18 1412 10/28/18 0615   Weight: 71.2 kg (156 lb 15.5 oz) 72.2 kg (159 lb 2.8 oz)    Body mass index is 24.93 kg/(m^2).    Constitutional: alert and oriented, looks more tired but not toxic.   CV: Variable rhythm, first in bigeminy, then regular and normal rate and then late tachycardic with occasional dropped beat, no edema, no JVD  Respiratory: mild crackles in the bases otherwise CTA bilaterally  GI: Soft, non-tender   Skin: Warm and dry    Data     Recent Labs  Lab 10/28/18  0556 10/27/18  0544 10/26/18  0530 10/25/18  1620 10/25/18  1145   WBC  --  0.7* 1.0*  --  1.3*   HGB  --  9.7* 10.2*  --  11.8   MCV  --  96 95  --  96   PLT  --  68* 83* 93* 100*   NA  --   --  140  --  137   POTASSIUM  --   --  4.0  --  3.9   CHLORIDE  --   --  107  --  103   CO2  --   --  22  --  24   BUN  --   --  11  --  13   CR 0.56  --  0.51* 0.52 0.57   ANIONGAP  --   --  11  --  10   BRUNO  --   --  8.3*  --  8.8   GLC  --   --  94  --  85   ALBUMIN  --   --  2.4*  --  2.9*   PROTTOTAL  --   --  5.6*  --  6.6*   BILITOTAL  --   --  0.7  --  1.4*   ALKPHOS  --   --  68  --  86   ALT  --   --  17  --  26   AST  --   --  13  --  14         Recent Labs  Lab 10/26/18  0530 10/25/18  1145   GLC 94 85        Unresulted Labs Ordered in the Past 30 Days of this Admission     Date and " Time Order Name Status Description    10/28/2018 0000 CBC with platelets differential In process            Imaging: No results found for this or any previous visit (from the past 24 hour(s)).     I reviewed all new labs and imaging results over the last 24 hours. I personally reviewed .    Medications     sodium chloride 60 mL/hr at 10/28/18 0257       aspirin  81 mg Oral Daily     silver sulfADIAZINE   Topical BID   Reviewed meds - hold aspirin    Enmanuel Jerez MD  [facility] Medicine

## 2018-10-28 NOTE — PLAN OF CARE
Problem: Patient Care Overview  Goal: Plan of Care/Patient Progress Review  Outcome: No Change  During heart assessment rated noted to be fast and irregular. HR increased to 120-140 and irregular. /78. Patient denied chest pain but did feel like her heart was going fast. Dr. Jerez and Charge nurse notified. HR decreased to 80's then up as high as 180's. 12 lead EKG captured 170. Dr. Jerez was in to see patient. Receiving fluid bolus of 1l LR over 1 hour. On telemetry. Patient also with fever. Tylenol given. Urine culture, blood culture and chest x ray ordered. To start on IV anibiotics

## 2018-10-28 NOTE — PLAN OF CARE
Problem: Patient Care Overview  Goal: Plan of Care/Patient Progress Review  Outcome: No Change  Patient was able to void and urine sent to lab. Patient does has excoriated dimitris area from radiation. Abnormal results. Message sent to Dr. Jerez.

## 2018-10-28 NOTE — PLAN OF CARE
Problem: Patient Care Overview  Goal: Plan of Care/Patient Progress Review  Outcome: No Change  Chest x ray done, blood cultures obtained. IV antibiotics started. HR improved to 50's to 60's after bolus given. Attempted to cath for urine but patient having too much pain and difficult to obtain due to pain and swelling. Dr. Jerez aware. Okay to collect with void. Patient first void was mixed with stool. Continue to need collection.

## 2018-10-28 NOTE — CONSULTS
Pharmacy Vancomycin Initial Note  Date of Service 2018  Patient's  1950  68 year old, female    Indication: Febrile Neutropenia    Current estimated CrCl = Estimated Creatinine Clearance: 111.1 mL/min (based on Cr of 0.56).    Creatinine for last 3 days  10/25/2018: 11:45 AM Creatinine 0.57 mg/dL;  4:20 PM Creatinine 0.52 mg/dL  10/26/2018:  5:30 AM Creatinine 0.51 mg/dL  10/28/2018:  5:56 AM Creatinine 0.56 mg/dL    Recent Vancomycin Level(s) for last 3 days  No results found for requested labs within last 72 hours.      Vancomycin IV Administrations (past 72 hours)      No vancomycin orders with administrations in past 72 hours.                Nephrotoxins and other renal medications (Future)    Start     Dose/Rate Route Frequency Ordered Stop    10/28/18 1100  vancomycin (VANCOCIN) 1000 mg in dextrose 5% 200 mL PREMIX      1,000 mg  200 mL/hr over 1 Hours Intravenous EVERY 12 HOURS 10/28/18 1025            Contrast Orders - past 72 hours     None                Plan:  1.  Start vancomycin  1000 mg IV q12h.   2.  Goal Trough Level: 10-15 mg/L   3.  Pharmacy will check trough levels as appropriate in 1-3 Days.    4. Serum creatinine levels will be ordered daily for the first week of therapy and at least twice weekly for subsequent weeks.    5. Hernandez method utilized to dose vancomycin therapy: Method 2    Aby Ibanez

## 2018-10-29 ENCOUNTER — APPOINTMENT (OUTPATIENT)
Dept: RADIATION THERAPY | Facility: OUTPATIENT CENTER | Age: 68
End: 2018-10-29
Payer: COMMERCIAL

## 2018-10-29 ENCOUNTER — TELEPHONE (OUTPATIENT)
Dept: NUTRITION | Facility: CLINIC | Age: 68
End: 2018-10-29

## 2018-10-29 LAB
ANION GAP SERPL CALCULATED.3IONS-SCNC: 8 MMOL/L (ref 3–14)
BASOPHILS # BLD AUTO: 0 10E9/L (ref 0–0.2)
BASOPHILS NFR BLD AUTO: 0 %
BUN SERPL-MCNC: 7 MG/DL (ref 7–30)
CALCIUM SERPL-MCNC: 7.5 MG/DL (ref 8.5–10.1)
CHLORIDE SERPL-SCNC: 107 MMOL/L (ref 94–109)
CO2 SERPL-SCNC: 24 MMOL/L (ref 20–32)
CREAT SERPL-MCNC: 0.49 MG/DL (ref 0.52–1.04)
DIFFERENTIAL METHOD BLD: ABNORMAL
EOSINOPHIL # BLD AUTO: 0 10E9/L (ref 0–0.7)
EOSINOPHIL NFR BLD AUTO: 0 %
ERYTHROCYTE [DISTWIDTH] IN BLOOD BY AUTOMATED COUNT: 13.3 % (ref 10–15)
GFR SERPL CREATININE-BSD FRML MDRD: >90 ML/MIN/1.7M2
GLUCOSE SERPL-MCNC: 97 MG/DL (ref 70–99)
HCT VFR BLD AUTO: 25.4 % (ref 35–47)
HGB BLD-MCNC: 8.8 G/DL (ref 11.7–15.7)
LYMPHOCYTES # BLD AUTO: 0.1 10E9/L (ref 0.8–5.3)
LYMPHOCYTES NFR BLD AUTO: 22 %
MCH RBC QN AUTO: 33.2 PG (ref 26.5–33)
MCHC RBC AUTO-ENTMCNC: 34.6 G/DL (ref 31.5–36.5)
MCV RBC AUTO: 96 FL (ref 78–100)
MONOCYTES # BLD AUTO: 0.2 10E9/L (ref 0–1.3)
MONOCYTES NFR BLD AUTO: 34 %
NEUTROPHILS # BLD AUTO: 0.2 10E9/L (ref 1.6–8.3)
NEUTROPHILS NFR BLD AUTO: 44 %
PLATELET # BLD AUTO: 43 10E9/L (ref 150–450)
PLATELET # BLD EST: ABNORMAL 10*3/UL
POTASSIUM SERPL-SCNC: 3.1 MMOL/L (ref 3.4–5.3)
POTASSIUM SERPL-SCNC: 3.5 MMOL/L (ref 3.4–5.3)
RBC # BLD AUTO: 2.65 10E12/L (ref 3.8–5.2)
RBC MORPH BLD: NORMAL
SODIUM SERPL-SCNC: 139 MMOL/L (ref 133–144)
VANCOMYCIN SERPL-MCNC: 6.8 MG/L
WBC # BLD AUTO: 0.5 10E9/L (ref 4–11)

## 2018-10-29 PROCEDURE — 85025 COMPLETE CBC W/AUTO DIFF WBC: CPT | Performed by: INTERNAL MEDICINE

## 2018-10-29 PROCEDURE — 25000132 ZZH RX MED GY IP 250 OP 250 PS 637: Mod: GY | Performed by: FAMILY MEDICINE

## 2018-10-29 PROCEDURE — 25000128 H RX IP 250 OP 636: Performed by: INTERNAL MEDICINE

## 2018-10-29 PROCEDURE — 25000128 H RX IP 250 OP 636: Performed by: FAMILY MEDICINE

## 2018-10-29 PROCEDURE — A9270 NON-COVERED ITEM OR SERVICE: HCPCS | Mod: GY | Performed by: FAMILY MEDICINE

## 2018-10-29 PROCEDURE — 84132 ASSAY OF SERUM POTASSIUM: CPT | Performed by: FAMILY MEDICINE

## 2018-10-29 PROCEDURE — 99233 SBSQ HOSP IP/OBS HIGH 50: CPT | Performed by: FAMILY MEDICINE

## 2018-10-29 PROCEDURE — G0463 HOSPITAL OUTPT CLINIC VISIT: HCPCS

## 2018-10-29 PROCEDURE — 12000007 ZZH R&B INTERMEDIATE

## 2018-10-29 PROCEDURE — 80202 ASSAY OF VANCOMYCIN: CPT | Performed by: FAMILY MEDICINE

## 2018-10-29 PROCEDURE — 80048 BASIC METABOLIC PNL TOTAL CA: CPT | Performed by: INTERNAL MEDICINE

## 2018-10-29 RX ORDER — POTASSIUM CHLORIDE 1500 MG/1
20-40 TABLET, EXTENDED RELEASE ORAL
Status: DISCONTINUED | OUTPATIENT
Start: 2018-10-29 | End: 2018-11-05 | Stop reason: HOSPADM

## 2018-10-29 RX ORDER — HEPARIN SODIUM,PORCINE 10 UNIT/ML
5-10 VIAL (ML) INTRAVENOUS EVERY 24 HOURS
Status: DISCONTINUED | OUTPATIENT
Start: 2018-10-29 | End: 2018-11-05 | Stop reason: HOSPADM

## 2018-10-29 RX ORDER — HEPARIN SODIUM (PORCINE) LOCK FLUSH IV SOLN 100 UNIT/ML 100 UNIT/ML
5 SOLUTION INTRAVENOUS
Status: DISCONTINUED | OUTPATIENT
Start: 2018-10-29 | End: 2018-11-05 | Stop reason: HOSPADM

## 2018-10-29 RX ORDER — SILVER SULFADIAZINE 10 MG/G
CREAM TOPICAL 2 TIMES DAILY PRN
Status: DISCONTINUED | OUTPATIENT
Start: 2018-10-29 | End: 2018-11-05 | Stop reason: HOSPADM

## 2018-10-29 RX ORDER — POTASSIUM CHLORIDE 29.8 MG/ML
20 INJECTION INTRAVENOUS
Status: DISCONTINUED | OUTPATIENT
Start: 2018-10-29 | End: 2018-10-29

## 2018-10-29 RX ORDER — SILVER SULFADIAZINE 10 MG/G
CREAM TOPICAL
Status: DISCONTINUED | OUTPATIENT
Start: 2018-10-29 | End: 2018-10-29 | Stop reason: CLARIF

## 2018-10-29 RX ORDER — POTASSIUM CHLORIDE 7.45 MG/ML
10 INJECTION INTRAVENOUS
Status: DISCONTINUED | OUTPATIENT
Start: 2018-10-29 | End: 2018-11-05 | Stop reason: HOSPADM

## 2018-10-29 RX ORDER — POTASSIUM CHLORIDE 1.5 G/1.58G
20-40 POWDER, FOR SOLUTION ORAL
Status: DISCONTINUED | OUTPATIENT
Start: 2018-10-29 | End: 2018-11-05 | Stop reason: HOSPADM

## 2018-10-29 RX ORDER — HEPARIN SODIUM,PORCINE 10 UNIT/ML
5-10 VIAL (ML) INTRAVENOUS
Status: DISCONTINUED | OUTPATIENT
Start: 2018-10-29 | End: 2018-11-05 | Stop reason: HOSPADM

## 2018-10-29 RX ORDER — POTASSIUM CL/LIDO/0.9 % NACL 10MEQ/0.1L
10 INTRAVENOUS SOLUTION, PIGGYBACK (ML) INTRAVENOUS
Status: DISCONTINUED | OUTPATIENT
Start: 2018-10-29 | End: 2018-11-05 | Stop reason: HOSPADM

## 2018-10-29 RX ADMIN — Medication: at 09:57

## 2018-10-29 RX ADMIN — FILGRASTIM 480 MCG: 480 INJECTION, SOLUTION INTRAVENOUS; SUBCUTANEOUS at 21:53

## 2018-10-29 RX ADMIN — VANCOMYCIN HYDROCHLORIDE 1000 MG: 1 INJECTION, SOLUTION INTRAVENOUS at 01:28

## 2018-10-29 RX ADMIN — OXYCODONE HYDROCHLORIDE 10 MG: 5 TABLET ORAL at 06:25

## 2018-10-29 RX ADMIN — VANCOMYCIN HYDROCHLORIDE 1000 MG: 1 INJECTION, SOLUTION INTRAVENOUS at 13:45

## 2018-10-29 RX ADMIN — SODIUM CHLORIDE, PRESERVATIVE FREE: 5 INJECTION INTRAVENOUS at 07:09

## 2018-10-29 RX ADMIN — PROCHLORPERAZINE EDISYLATE 5 MG: 5 INJECTION INTRAMUSCULAR; INTRAVENOUS at 04:11

## 2018-10-29 RX ADMIN — CEFEPIME HYDROCHLORIDE 2 G: 2 INJECTION, POWDER, FOR SOLUTION INTRAVENOUS at 04:19

## 2018-10-29 RX ADMIN — SILVER SULFADIAZINE: 10 CREAM TOPICAL at 02:14

## 2018-10-29 RX ADMIN — POTASSIUM CHLORIDE 20 MEQ: 1500 TABLET, EXTENDED RELEASE ORAL at 12:44

## 2018-10-29 RX ADMIN — OXYCODONE HYDROCHLORIDE 10 MG: 5 TABLET ORAL at 10:41

## 2018-10-29 RX ADMIN — PROCHLORPERAZINE EDISYLATE 5 MG: 5 INJECTION INTRAMUSCULAR; INTRAVENOUS at 17:26

## 2018-10-29 RX ADMIN — CEFEPIME HYDROCHLORIDE 2 G: 2 INJECTION, POWDER, FOR SOLUTION INTRAVENOUS at 13:01

## 2018-10-29 RX ADMIN — Medication: at 02:14

## 2018-10-29 RX ADMIN — POTASSIUM CHLORIDE 40 MEQ: 1500 TABLET, EXTENDED RELEASE ORAL at 10:41

## 2018-10-29 RX ADMIN — CEFEPIME HYDROCHLORIDE 2 G: 2 INJECTION, POWDER, FOR SOLUTION INTRAVENOUS at 21:45

## 2018-10-29 ASSESSMENT — ACTIVITIES OF DAILY LIVING (ADL)
ADLS_ACUITY_SCORE: 12
ADLS_ACUITY_SCORE: 18
ADLS_ACUITY_SCORE: 12
ADLS_ACUITY_SCORE: 12

## 2018-10-29 NOTE — PROGRESS NOTES
"Small emesis at this time.  Patient says that it was her supper she vomitted \"The meatballs did not agree with me.\"  Compazine IV given.  "

## 2018-10-29 NOTE — PLAN OF CARE
"Problem: Pain, Acute (Adult)  Goal: Identify Related Risk Factors and Signs and Symptoms  Related risk factors and signs and symptoms are identified upon initiation of Human Response Clinical Practice Guideline (CPG).   Outcome: No Change  Patient reports \"pain is comfortably managed\" at this time.  Medicated with oxycodone 10 mg oraly at 1040.  Also applied Morphine gel once today to labia/anal area with wound cares done by CHRIS RN at 0957.  Patient declines need for any pain medication at this time.      "

## 2018-10-29 NOTE — PROGRESS NOTES
[facility]      Subjective:  Feels crummy, weak  No diarrhea  Eating  Pelvic pain  No radiation today due to low wbc    Review of Systems:  CONSTITUTIONAL: NEGATIVE for fever, chills, change in weight  INTEGUMENTARY/SKIN: NEGATIVE for worrisome rashes, moles or lesions  EYES: NEGATIVE for vision changes or irritation  ENT/MOUTH: NEGATIVE for ear, mouth and throat problems  RESP: NEGATIVE for significant cough or SOB  BREAST: NEGATIVE for masses, tenderness or discharge  CV: NEGATIVE for chest pain, palpitations or peripheral edema  GI: NEGATIVE for nausea, abdominal pain, heartburn, or change in bowel habits  : NEGATIVE for frequency, dysuria, or hematuria  MUSCULOSKELETAL: NEGATIVE for significant arthralgias or myalgia  NEURO: NEGATIVE for weakness, dizziness or paresthesias  ENDOCRINE: NEGATIVE for temperature intolerance, skin/hair changes  HEME: NEGATIVE for bleeding problems  PSYCHIATRIC: NEGATIVE for changes in mood or affect    Physical Exam:  Vitals Were Reviewed    Patient Vitals for the past 16 hrs:   BP Temp Temp src Pulse Heart Rate Resp SpO2 Weight   10/29/18 0814 - - - - 74 - - -   10/29/18 0806 119/45 98.6  F (37  C) Oral 75 - 18 94 % -   10/29/18 0442 126/52 100  F (37.8  C) Oral 81 - 18 95 % 73.9 kg (162 lb 14.7 oz)   10/29/18 0000 - - - - 74 - - -   10/28/18 2312 135/60 99.6  F (37.6  C) Oral 72 - 16 96 % -         Intake/Output Summary (Last 24 hours) at 10/29/18 1241  Last data filed at 10/29/18 0900   Gross per 24 hour   Intake          3119.58 ml   Output             1030 ml   Net          2089.58 ml       GENERAL APPEARANCE: healthy, alert and no distress  EYES: conjunctiva clear, eyes grossly normal  RESP: lungs clear to auscultation - no rales, rhonchi or wheezes  CV: regular rate and rhythm, normal S1 S2, no S3 or S4 and no murmur, click or rub   ABDOMEN: soft, nontender, no HSM or masses and bowel sounds normal   (female): unchanged  SKIN:  unchanged    Lab:  Recent Labs   Lab Test  10/29/18   0637  10/28/18   0556  10/26/18   0530   NA  139   --   140   POTASSIUM  3.1*   --   4.0   CHLORIDE  107   --   107   CO2  24   --   22   ANIONGAP  8   --   11   GLC  97   --   94   BUN  7   --   11   CR  0.49*  0.56  0.51*   BRUNO  7.5*   --   8.3*     CBC RESULTS:   Recent Labs   Lab Test  10/29/18   0637  10/28/18   0556   WBC  0.5*  0.5*   RBC  2.65*  2.67*   HGB  8.8*  8.9*   HCT  25.4*  25.8*   PLT  43*  48*       Results for orders placed or performed during the hospital encounter of 10/25/18 (from the past 24 hour(s))   UA with Microscopic reflex to Culture   Result Value Ref Range    Color Urine Yellow     Appearance Urine Cloudy     Glucose Urine Negative NEG^Negative mg/dL    Bilirubin Urine Negative NEG^Negative    Ketones Urine 5 (A) NEG^Negative mg/dL    Specific Gravity Urine 1.017 1.003 - 1.035    Blood Urine Small (A) NEG^Negative    pH Urine 5.0 5.0 - 7.0 pH    Protein Albumin Urine 30 (A) NEG^Negative mg/dL    Urobilinogen mg/dL 0.0 0.0 - 2.0 mg/dL    Nitrite Urine Negative NEG^Negative    Leukocyte Esterase Urine Large (A) NEG^Negative    Source Midstream Urine     WBC Urine >182 (H) 0 - 5 /HPF    RBC Urine 31 (H) 0 - 2 /HPF    WBC Clumps Present (A) NEG^Negative /HPF    Bacteria Urine Few (A) NEG^Negative /HPF    Squamous Epithelial /HPF Urine 3 (H) 0 - 1 /HPF    Mucous Urine Present (A) NEG^Negative /LPF   Urine Culture Aerobic Bacterial   Result Value Ref Range    Specimen Description Midstream Urine     Special Requests Specimen received in preservative     Culture Micro PENDING    CBC with platelets differential   Result Value Ref Range    WBC 0.5 (LL) 4.0 - 11.0 10e9/L    RBC Count 2.65 (L) 3.8 - 5.2 10e12/L    Hemoglobin 8.8 (L) 11.7 - 15.7 g/dL    Hematocrit 25.4 (L) 35.0 - 47.0 %    MCV 96 78 - 100 fl    MCH 33.2 (H) 26.5 - 33.0 pg    MCHC 34.6 31.5 - 36.5 g/dL    RDW 13.3 10.0 - 15.0 %    Platelet Count 43 (LL) 150 - 450 10e9/L    Diff  Method Manual Differential     % Neutrophils 44.0 %    % Lymphocytes 22.0 %    % Monocytes 34.0 %    % Eosinophils 0.0 %    % Basophils 0.0 %    Absolute Neutrophil 0.2 (LL) 1.6 - 8.3 10e9/L    Absolute Lymphocytes 0.1 (L) 0.8 - 5.3 10e9/L    Absolute Monocytes 0.2 0.0 - 1.3 10e9/L    Absolute Eosinophils 0.0 0.0 - 0.7 10e9/L    Absolute Basophils 0.0 0.0 - 0.2 10e9/L    RBC Morphology Normal     Platelet Estimate       Automated count confirmed.  Platelet morphology is normal.   Basic metabolic panel   Result Value Ref Range    Sodium 139 133 - 144 mmol/L    Potassium 3.1 (L) 3.4 - 5.3 mmol/L    Chloride 107 94 - 109 mmol/L    Carbon Dioxide 24 20 - 32 mmol/L    Anion Gap 8 3 - 14 mmol/L    Glucose 97 70 - 99 mg/dL    Urea Nitrogen 7 7 - 30 mg/dL    Creatinine 0.49 (L) 0.52 - 1.04 mg/dL    GFR Estimate >90 >60 mL/min/1.7m2    GFR Estimate If Black >90 >60 mL/min/1.7m2    Calcium 7.5 (L) 8.5 - 10.1 mg/dL       Assessment and Plan:    Jenise Calix is a 68 year old female who presented on 10/25/2018 with nausea, vomiting, diarrhea and anal pain due to chemoradiation therapy.     Chemotherapy induced nausea and vomiting and diarrhea  Much improved with hydration. No stools since admit. Diet advancing.      Squamous cell cancer of the anus   Undergoing radiation and has radiation dermatitis with significant pain. Pain control improving with addition of topical morphine.      - continue oxycodone 10 mg and hydromorphone for breakthrough   - silvadine and lidocaine topical   - continue morphine topical       Neutropenic fever   ANC 0.3 10/28. Has not been neutropenic with past treatment, but did receive 5FU and mitomycin which can each suppress bone marrow. Spiked fever late 10/27 to 101.5 and reported to me this morning. Some tachycardia. No localizing symptoms of infection. LA last night 0.5.    Neupogen started 10/28  t max 100.6 on 10/29/18 , blood cultures NGTD, UC pending, on cefepime/vanco, ANC 0.2, ua had  pyuria so possible uti.     Pancytopenia due to chemotherapy  Thrombocytopenia, mild, chemo related.   Last chemo finished infusion 10/22.   Hgb not needing transfusion at this point. Platelets 43k and will hold aspirin. Neutropenic as above.      Paroxysmal atrial fibrillation  Tachycardia, possible atrial fibrillation with RVR 10/28   On aspirin at baseline.    Having episodes of tachycardia off and on today 10/28. Fairly regular on ECG with rate 170 but could also be atrial fibrillation. Likely triggered by fever. No chest pain, hypotension, shortness of breath   No tachycardia on October 29, 2018.     Non-Severe / Mild or Moderate (protein-calorie) Malnutrition      DVT Prophylaxis: aspirin presently, watch platelets  Code Status: Full Code     Lines: PIV   Robledo catheter: None        Discussion: had been improving but now fever since 10/28/18      Disposition: Anticipate discharge once fever resolves and ANC > 500, continue antibiotics and neupogen.

## 2018-10-29 NOTE — PHARMACY-VANCOMYCIN DOSING SERVICE
Pharmacy Vancomycin Note  Date of Service 2018  Patient's  1950   68 year old, female    Indication: Febrile Neutropenia  Goal Trough Level: 10-15 mg/L  Day of Therapy: 2  Current Vancomycin regimen:  1000 mg IV q12h    Current estimated CrCl = Estimated Creatinine Clearance: 130 mL/min (based on Cr of 0.49).    Creatinine for last 3 days  10/28/2018:  5:56 AM Creatinine 0.56 mg/dL  10/29/2018:  6:37 AM Creatinine 0.49 mg/dL    Recent Vancomycin Levels (past 3 days)  10/29/2018: 12:58 PM Vancomycin Level 6.8 mg/L    Vancomycin IV Administrations (past 72 hours)                   vancomycin (VANCOCIN) 1000 mg in dextrose 5% 200 mL PREMIX (mg) 1,000 mg New Bag 10/29/18 1345     1,000 mg New Bag  0128     1,000 mg New Bag 10/28/18 1232                Nephrotoxins and other renal medications (Future)    Start     Dose/Rate Route Frequency Ordered Stop    10/28/18 1100  vancomycin (VANCOCIN) 1000 mg in dextrose 5% 200 mL PREMIX      1,000 mg  200 mL/hr over 1 Hours Intravenous EVERY 12 HOURS 10/28/18 1025               Contrast Orders - past 72 hours     None          Interpretation of levels and current regimen:  Trough level is  Subtherapeutic    Has serum creatinine changed > 50% in last 72 hours: No    Urine output:  unable to determine    Renal Function: Stable    Plan:  1.  Continue Current Dose - patient is not yet at steady state, so left dose the same.  Continue to follow.  2.  Pharmacy will check trough levels as appropriate in 1-3 Days.    3. Serum creatinine levels will be ordered daily for the first week of therapy and at least twice weekly for subsequent weeks.      Chau Connolly

## 2018-10-29 NOTE — PROGRESS NOTES
Hematology/ Oncology Follow-up Visit:  Oct 25, 2018    Reason for Visit:   Chief Complaint   Patient presents with     Oncology Clinic Visit     Follow up Anal cancer.        Oncologic History:  Malignant neoplasm of anal canal (H)  Jenise Calix has been difficulty with hemorrhoids for several years.  She saw  last year in September 2017 because of large hemorrhoids and it was recommended to proceed with surgery.  Patient elected to postpone the surgery and go to [place] to visit her children.  She continued to have difficulty with defecation.  She has been having also pain with bowel movement as well.  She has been taking stool softener.  She has been also having occasional rectal bleeding.  She recently saw Dr. Becerra on July 16, 2018 and had a flexible sigmoidoscopy which showed severely ulcerated lesion at 12 o'clock position was definitive irregularity and fullness at 6 o'clock position.  Biopsies were obtained.  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  Patient had a CT scan of the chest abdomen and pelvis on July 16, 2018 which revealed 0.9 cm nodule he will aspect of the right breast felt to be sebaceous cyst.  There was a mass involving the anus and the rectum particularly on the right measuring about 7 cm x 4 cm and extending anterior to posterior 5 cm posterior and 10 cm in longest axis.  This is inseparable from the floor of urinary bladder, extending to the skin service and engulfing the vagina and urethra.  The perirectal and anal lymph nodes were involved.  There is also a 3 cm mass in the left inguinal nodes.  PET scan was done showing  intensely hypermetabolic mass in the anal canal consistent with primary malignancy.  There is hypermetabolic adenopathy in the left inguinal location worrisome for malignant adenopathy.. There is a focal  "hypermetabolic area in the left thyroid indeterminant.. No suggestion of metastatic disease.    Patient started on radiation radiation therapy concurrent with infusional 5-FU and mitomycin.      Interval History:  She returning today for follow-up.  She has been having increasing nausea and vomiting lately.  She has been also having pain at the radiation site.  Pain medication has been giving the patient nausea.  She is also having diarrhea as well.    Review Of Systems:  Constitutional: Negative for fever, chills, and night sweats.  Skin: negative.  Eyes: negative.  Ears/Nose/Throat: negative.  Respiratory: No shortness of breath, dyspnea on exertion, cough, or hemoptysis.  Cardiovascular: negative.  Gastrointestinal: negative.  Genitourinary: negative.  Musculoskeletal: negative.  Neurologic: negative.  Psychiatric: negative.  Hematologic/Lymphatic/Immunologic: negative.  Endocrine: negative.    All other ROS negative unless mentioned in interval history.    Past medical, social, surgical, and family histories reviewed.    Allergies:  Allergies as of 10/25/2018 - Chau as Reviewed 10/25/2018   Allergen Reaction Noted     Metoprolol  07/26/2018       Current Medications:  No current outpatient prescriptions on file.        Physical Exam:  /68 (BP Location: Right arm, Patient Position: Sitting, Cuff Size: Adult Regular)  Pulse 106  Temp 98.5  F (36.9  C) (Tympanic)  Resp 16  Ht 1.702 m (5' 7\")  Wt 69.4 kg (153 lb 1.6 oz)  SpO2 98%  Breastfeeding? No  BMI 23.98 kg/m2  Wt Readings from Last 12 Encounters:   10/29/18 73.9 kg (162 lb 14.7 oz)   10/25/18 69.4 kg (153 lb 1.6 oz)   10/17/18 73.1 kg (161 lb 3.2 oz)   10/10/18 73.5 kg (162 lb)   10/10/18 73.5 kg (162 lb)   10/03/18 72.1 kg (159 lb)   09/26/18 74.8 kg (165 lb)   09/18/18 76.7 kg (169 lb)   09/14/18 76.7 kg (169 lb)   08/30/18 76.9 kg (169 lb 9.6 oz)   08/29/18 77.2 kg (170 lb 3.2 oz)   08/23/18 77.4 kg (170 lb 11.2 oz)     ECOG performance " status: 1  GENERAL APPEARANCE: Healthy, alert and in no acute distress.  HEENT: Sclerae anicteric. PERRLA. Oropharynx without ulcers, lesions, or thrush.  NECK: Supple. No asymmetry or masses.  LYMPHATICS: No palpable cervical, supraclavicular, axillary, or inguinal lymphadenopathy.  RESP: Lungs clear to auscultation bilaterally without rales, rhonchi or wheezes.  CARDIOVASCULAR: Regular rate and rhythm. Normal S1, S2; no S3 or S4. No murmur, gallop, or rub.  ABDOMEN: Soft, nontender. Bowel sounds normal. No palpable organomegaly or masses.  MUSCULOSKELETAL: Extremities without gross deformities noted. No edema of bilateral lower extremities.  SKIN: No suspicious lesions or rashes.  NEURO: Alert and oriented x 3. Cranial nerves II-XII grossly intact.  PSYCHIATRIC: Mentation and affect appear normal.    Laboratory/Imaging Studies:  Infusion Therapy Visit on 10/18/2018   Component Date Value Ref Range Status     WBC 10/18/2018 6.5  4.0 - 11.0 10e9/L Final     RBC Count 10/18/2018 3.71* 3.8 - 5.2 10e12/L Final     Hemoglobin 10/18/2018 12.5  11.7 - 15.7 g/dL Final     Hematocrit 10/18/2018 35.8  35.0 - 47.0 % Final     MCV 10/18/2018 97  78 - 100 fl Final     MCH 10/18/2018 33.7* 26.5 - 33.0 pg Final     MCHC 10/18/2018 34.9  31.5 - 36.5 g/dL Final     RDW 10/18/2018 13.2  10.0 - 15.0 % Final     Platelet Count 10/18/2018 174  150 - 450 10e9/L Final     Diff Method 10/18/2018 Automated Method   Final     % Neutrophils 10/18/2018 85.7  % Final     % Lymphocytes 10/18/2018 4.6  % Final     % Monocytes 10/18/2018 7.8  % Final     % Eosinophils 10/18/2018 1.4  % Final     % Basophils 10/18/2018 0.2  % Final     % Immature Granulocytes 10/18/2018 0.3  % Final     Nucleated RBCs 10/18/2018 0  0 /100 Final     Absolute Neutrophil 10/18/2018 5.6  1.6 - 8.3 10e9/L Final     Absolute Lymphocytes 10/18/2018 0.3* 0.8 - 5.3 10e9/L Final     Absolute Monocytes 10/18/2018 0.5  0.0 - 1.3 10e9/L Final     Absolute Eosinophils  10/18/2018 0.1  0.0 - 0.7 10e9/L Final     Absolute Basophils 10/18/2018 0.0  0.0 - 0.2 10e9/L Final     Abs Immature Granulocytes 10/18/2018 0.0  0 - 0.4 10e9/L Final     Absolute Nucleated RBC 10/18/2018 0.0   Final     Sodium 10/18/2018 137  133 - 144 mmol/L Final     Potassium 10/18/2018 4.1  3.4 - 5.3 mmol/L Final     Chloride 10/18/2018 106  94 - 109 mmol/L Final     Carbon Dioxide 10/18/2018 25  20 - 32 mmol/L Final     Anion Gap 10/18/2018 6  3 - 14 mmol/L Final     Glucose 10/18/2018 85  70 - 99 mg/dL Final     Urea Nitrogen 10/18/2018 10  7 - 30 mg/dL Final     Creatinine 10/18/2018 0.61  0.52 - 1.04 mg/dL Final     GFR Estimate 10/18/2018 >90  >60 mL/min/1.7m2 Final    Non  GFR Calc     GFR Estimate If Black 10/18/2018 >90  >60 mL/min/1.7m2 Final    African American GFR Calc     Calcium 10/18/2018 9.1  8.5 - 10.1 mg/dL Final     Bilirubin Total 10/18/2018 0.4  0.2 - 1.3 mg/dL Final     Albumin 10/18/2018 2.9* 3.4 - 5.0 g/dL Final     Protein Total 10/18/2018 6.2* 6.8 - 8.8 g/dL Final     Alkaline Phosphatase 10/18/2018 79  40 - 150 U/L Final     ALT 10/18/2018 18  0 - 50 U/L Final     AST 10/18/2018 11  0 - 45 U/L Final        Recent Results (from the past 744 hour(s))   XR Chest Port 1 View    Narrative    XR CHEST PORT 1 VW 10/28/2018 11:36 AM    HISTORY: Fever.     COMPARISON: September 18, 2018.       Impression    IMPRESSION: Left-sided portacatheter in position as before. The lungs  are clear. No focal pulmonary opacities. Heart and mediastinum are  unremarkable. No acute cardiopulmonary abnormalities.     MELISSA CRUZ MD       Assessment and plan:  (C21.1) Malignant neoplasm of anal canal (H)  (primary encounter diagnosis)  Patient will continue with her treatment plan with patient therapy concurrently with Xeloda.  Patient will be hospitalized to conclude her radiation therapy as inpatient.  We will see the patient following treatment concluded to assess response to  treatment.    (R11.10,  R19.7) Vomiting and diarrhea  Today we will arrange for IV fluids, Zofran and dexamethasone.  Because of dehydration we would recommend patient to be hospitalized for pain management as well as dehydration.    The patient is ready to learn, no apparent learning barriers were identified.  Diagnosis and treatment plans were explained to the patient. The patient expressed understanding of the content. The patient asked appropriate questions. The patient questions were answered to her satisfaction.    Chart documentation with Dragon Voice recognition Software. Although reviewed after completion, some words and grammatical errors may remain.

## 2018-10-29 NOTE — TELEPHONE ENCOUNTER
Patient reported concerns about her ability to eat on the Oncology Distress Screening Tool at her recent visit to the oncology clinic. The patient was seen by the dietitian (10/26) during her current hospital admission so will not call the patient at this time.    Eugenia Alvarado RD,LD  Clinical Dietitian

## 2018-10-29 NOTE — PROGRESS NOTES
[facility] Nurse Inpatient Adult WoundAssessment    Initial Assessment  Reason for consultation: Evaluate and treat moist desquamation of perineum from raditation    Assessment:   Radiation associated skin damage including dry and moist desquamation perineum including labia, groin, perianal skin, and buttocks      TREATMENT  PLAN    Perineal wound: care recommendations per orders written:     Skin of buttocks and groin has been treated with 3M Cavilon Advanced Skin Protectant, this is designed to provide a protective layer on skin for days and should not be reapplied prior to Thursday, (WO nurse to follow-up with pt tomorrow and will reapply Thursday if pt remains inpatient and this is helping).  Will need to continue to use the Morphine gel every 4 hours as needed to labia/anal area.  May also use silvadene BID in this area (though not applied at same time as morphine,) if this is comforting to pt.  Hold topical lidocaine for now as this is causing pain.  Apply Aquaphor to reddened/non-wounded skin of perineum once daily and as needed for comfort.     Gently clean with incontinence wipes or rinse with warm water and dimitris-bottle and pat dry with dry soft gauze or disposable washcloths prior to reapplication of morphine gel or silvadene and following toileting, (is ok to wipe over areas treated with 3M product).      May try to keep broken down skin on buttocks covered with Mepilex light, (cut pieces as needed) for protection/comfort.      Due to gel running when warmed, will need either incontinence brief or pad in underwear to catch gel and any drainage from sores.      WO Nurse follow-up plan: Plan to check in with pt tomorrow  Nursing to notify the Provider(s) and re-consult the WO Nurse if wound(s) deteriorates or new skin concern.    Patient History  According to provider note(s):    CHIEF COMPLAINT:  Nausea, vomiting, diarrhea and pelvic pain.       HISTORY OF PRESENT ILLNESS:  Jenise Calix is a 68-year-old  female who was diagnosed with anal cancer in 07/2018.  She also has a history of paroxysmal atrial fibrillation and tobacco use.  The patient has been recently treated at [facility] with chemoradiation.  She started radiation this fall and is doing a 6-week course of radiation, which will be done next Thursday.  She had radiation today and is supposed to have radiation again tomorrow.  She also has been getting a chemo pump.  She has had 2 installments of this.  She receives mitomycin and 5-FU through the pump.  The second pump was placed 10/18/2018 and it was removed 10/22/2018.  On 10/23/2018 she developed persistent nausea, vomiting and intractable nonbloody diarrhea.  These symptoms persisted on 10/24/2018 and 10/25/2018 she came in for radiation therapy and she was seen in the oncology clinic.  Labs were obtained.  She had an absolute neutrophil count of 1.0.  She was admitted for treatment of the nausea, vomiting, diarrhea and also persistent pelvic pain.  She has redness throughout the pelvic area in a diaper like distribution with some ulcerations in the perianal area on the buttocks, these are superficial.  She has been using Silvadene cream and also lidocaine cream on these.  She has been using oxycodone for pain.  She says that it used to help, but it is not helping now.  She also has been given the MS Contin but she did not think it helped much for pain and it made her feel wobbly and it affected her balance, so she stopped this.       She talked to the radiation oncologist today who felt that she would be in the hospital for at least 2 nights.  She denies fever or any infectious symptoms such as URI symptoms, cough, chest pain, UTI symptoms or abdominal pain other than the pain that she thinks is coming from radiation.       The patient says her diarrhea has been better today.  She has had 2 episodes.  She has some nausea, but she has had no recent vomiting.       PAST MEDICAL HISTORY:   1.   Paroxysmal atrial fibrillation that she has had since the birth of one of her children.  She has not had an episode for some time.  She is just on aspirin.   2.  Long-term cigarette use.   3.  Squamous cell carcinoma of the anus diagnosed 2018, currently being treated with chemoradiation through the cancer center here.         Objective Data   Containment of urine/stool: is continent of urine and stool, had been having diarrhea but no longer having    Current Diet/ Nutrition:    Active Diet Order      Advance Diet as Tolerated: Regular Diet Adult    Output:   I/O last 3 completed shifts:  In: 3119.58 [P.O.:180; I.V.:2939.58]  Out: 1880 [Urine:1080; Other:800]    Skin Assessment: Jose Jose Score  Av.4  Min: 19  Max: 20                                Jose Q No Data Recorded                     NSRAS No Data Recorded               Labs:   Recent Labs  Lab 10/29/18  0637   HGB 8.8*   WBC 0.5*           Recent Labs  Lab 10/28/18  1630 10/28/18  1157 10/28/18  1154   CULT PENDING No growth after 10 hours No growth after 10 hours       Physical Exam    Skin assessment:   Focused skin inspection: Perineum    Wound Location:  Cuca-area  Area of concerned distributed over entire perineum and buttocks in diaper distribution, perineum with erythema/dry desquamation and moist desquamation of labia, groin folds, perianal skin and buttocks over jose cleft and bilateral buttocks along jose cleft.  There is some loose peeling skin in groin and buttocks.      Area is very sore. Current plan of care includes topical application of silvadene, lidocaine gel, and morphine gel.  Pt states the lidocaine gel causes burning.  Some relief with the morphine gel, used to have relief with the lidocaine and silvadene but not recently.      Interventions  Current support surface: Standard  Atmos Air mattress    Current off-loading measures: n/a  Visual inspection of wound(s) completed  Wound Care: Area cleansed gently with  incontinence wipes, this was tolerable for her.   Areas of moist desquamation dusted with adapt ostomy powder then dabbed over with 3M Cavilon Advanced Skin Protectant and fanned to dry.  Morphine gel applied to labia and perianal skin.  2 piece of Mepilex light applied on either side of buttocks along jose cleft for protection, (after 3M skin protectant allowed to dry)  Supplies: placed at the bedside  Education provided today: Use of current products, plan of care.  Unfortunately she lives alone at home and would not be able to apply this herself.  If this is comfortable for her, may need to have assistance to apply every few days until improved.  Is due to complete radiation this week.      Discussed plan of care with Patient and Nurse    Face to face time: 25 minutes     Coni Ibanez RN, CWOCN

## 2018-10-30 ENCOUNTER — DOCUMENTATION ONLY (OUTPATIENT)
Dept: RADIATION THERAPY | Facility: OUTPATIENT CENTER | Age: 68
End: 2018-10-30

## 2018-10-30 LAB
ANION GAP SERPL CALCULATED.3IONS-SCNC: 9 MMOL/L (ref 3–14)
BACTERIA SPEC CULT: NO GROWTH
BASOPHILS # BLD AUTO: 0 10E9/L (ref 0–0.2)
BASOPHILS NFR BLD AUTO: 0 %
BUN SERPL-MCNC: 5 MG/DL (ref 7–30)
CALCIUM SERPL-MCNC: 7.6 MG/DL (ref 8.5–10.1)
CHLORIDE SERPL-SCNC: 109 MMOL/L (ref 94–109)
CO2 SERPL-SCNC: 23 MMOL/L (ref 20–32)
CREAT SERPL-MCNC: 0.46 MG/DL (ref 0.52–1.04)
DIFFERENTIAL METHOD BLD: ABNORMAL
EOSINOPHIL # BLD AUTO: 0 10E9/L (ref 0–0.7)
EOSINOPHIL NFR BLD AUTO: 0 %
ERYTHROCYTE [DISTWIDTH] IN BLOOD BY AUTOMATED COUNT: 13.6 % (ref 10–15)
GFR SERPL CREATININE-BSD FRML MDRD: >90 ML/MIN/1.7M2
GLUCOSE SERPL-MCNC: 96 MG/DL (ref 70–99)
HCT VFR BLD AUTO: 22.6 % (ref 35–47)
HGB BLD-MCNC: 8 G/DL (ref 11.7–15.7)
LYMPHOCYTES # BLD AUTO: 0.1 10E9/L (ref 0.8–5.3)
LYMPHOCYTES NFR BLD AUTO: 14 %
Lab: NORMAL
MCH RBC QN AUTO: 33.5 PG (ref 26.5–33)
MCHC RBC AUTO-ENTMCNC: 35.4 G/DL (ref 31.5–36.5)
MCV RBC AUTO: 95 FL (ref 78–100)
MONOCYTES # BLD AUTO: 0.1 10E9/L (ref 0–1.3)
MONOCYTES NFR BLD AUTO: 18 %
MYELOCYTES # BLD: 0 10E9/L
MYELOCYTES NFR BLD MANUAL: 4 %
NEUTROPHILS # BLD AUTO: 0.4 10E9/L (ref 1.6–8.3)
NEUTROPHILS NFR BLD AUTO: 64 %
PLATELET # BLD AUTO: 30 10E9/L (ref 150–450)
PLATELET # BLD EST: ABNORMAL 10*3/UL
POTASSIUM SERPL-SCNC: 3.2 MMOL/L (ref 3.4–5.3)
POTASSIUM SERPL-SCNC: 3.7 MMOL/L (ref 3.4–5.3)
RBC # BLD AUTO: 2.39 10E12/L (ref 3.8–5.2)
RBC MORPH BLD: NORMAL
SODIUM SERPL-SCNC: 141 MMOL/L (ref 133–144)
SPECIMEN SOURCE: NORMAL
WBC # BLD AUTO: 0.7 10E9/L (ref 4–11)

## 2018-10-30 PROCEDURE — 93005 ELECTROCARDIOGRAM TRACING: CPT

## 2018-10-30 PROCEDURE — 80048 BASIC METABOLIC PNL TOTAL CA: CPT | Performed by: FAMILY MEDICINE

## 2018-10-30 PROCEDURE — 99233 SBSQ HOSP IP/OBS HIGH 50: CPT | Performed by: FAMILY MEDICINE

## 2018-10-30 PROCEDURE — 25000132 ZZH RX MED GY IP 250 OP 250 PS 637: Mod: GY | Performed by: FAMILY MEDICINE

## 2018-10-30 PROCEDURE — 25000128 H RX IP 250 OP 636: Performed by: FAMILY MEDICINE

## 2018-10-30 PROCEDURE — 12000007 ZZH R&B INTERMEDIATE

## 2018-10-30 PROCEDURE — G0463 HOSPITAL OUTPT CLINIC VISIT: HCPCS

## 2018-10-30 PROCEDURE — 25000128 H RX IP 250 OP 636: Performed by: INTERNAL MEDICINE

## 2018-10-30 PROCEDURE — A9270 NON-COVERED ITEM OR SERVICE: HCPCS | Mod: GY | Performed by: FAMILY MEDICINE

## 2018-10-30 PROCEDURE — 85025 COMPLETE CBC W/AUTO DIFF WBC: CPT | Performed by: FAMILY MEDICINE

## 2018-10-30 PROCEDURE — 36415 COLL VENOUS BLD VENIPUNCTURE: CPT | Performed by: FAMILY MEDICINE

## 2018-10-30 PROCEDURE — 84132 ASSAY OF SERUM POTASSIUM: CPT | Performed by: FAMILY MEDICINE

## 2018-10-30 RX ADMIN — OXYCODONE HYDROCHLORIDE 10 MG: 5 TABLET ORAL at 22:46

## 2018-10-30 RX ADMIN — VANCOMYCIN HYDROCHLORIDE 1000 MG: 1 INJECTION, SOLUTION INTRAVENOUS at 00:05

## 2018-10-30 RX ADMIN — VANCOMYCIN HYDROCHLORIDE 1000 MG: 1 INJECTION, SOLUTION INTRAVENOUS at 13:10

## 2018-10-30 RX ADMIN — Medication: at 15:32

## 2018-10-30 RX ADMIN — OXYCODONE HYDROCHLORIDE 10 MG: 5 TABLET ORAL at 10:00

## 2018-10-30 RX ADMIN — SODIUM CHLORIDE, PRESERVATIVE FREE: 5 INJECTION INTRAVENOUS at 10:10

## 2018-10-30 RX ADMIN — POTASSIUM CHLORIDE 20 MEQ: 1500 TABLET, EXTENDED RELEASE ORAL at 11:10

## 2018-10-30 RX ADMIN — FILGRASTIM 480 MCG: 480 INJECTION, SOLUTION INTRAVENOUS; SUBCUTANEOUS at 20:19

## 2018-10-30 RX ADMIN — CEFEPIME HYDROCHLORIDE 2 G: 2 INJECTION, POWDER, FOR SOLUTION INTRAVENOUS at 04:30

## 2018-10-30 RX ADMIN — CEFEPIME HYDROCHLORIDE 2 G: 2 INJECTION, POWDER, FOR SOLUTION INTRAVENOUS at 11:53

## 2018-10-30 RX ADMIN — OXYCODONE HYDROCHLORIDE 10 MG: 5 TABLET ORAL at 18:45

## 2018-10-30 RX ADMIN — OXYCODONE HYDROCHLORIDE 10 MG: 5 TABLET ORAL at 14:15

## 2018-10-30 RX ADMIN — OXYCODONE HYDROCHLORIDE 10 MG: 5 TABLET ORAL at 00:03

## 2018-10-30 RX ADMIN — CEFEPIME HYDROCHLORIDE 2 G: 2 INJECTION, POWDER, FOR SOLUTION INTRAVENOUS at 20:10

## 2018-10-30 RX ADMIN — POTASSIUM CHLORIDE 40 MEQ: 1500 TABLET, EXTENDED RELEASE ORAL at 08:48

## 2018-10-30 RX ADMIN — PROCHLORPERAZINE MALEATE 5 MG: 5 TABLET, FILM COATED ORAL at 15:56

## 2018-10-30 RX ADMIN — PROCHLORPERAZINE MALEATE 5 MG: 5 TABLET, FILM COATED ORAL at 08:48

## 2018-10-30 ASSESSMENT — ACTIVITIES OF DAILY LIVING (ADL)
ADLS_ACUITY_SCORE: 14
ADLS_ACUITY_SCORE: 14
ADLS_ACUITY_SCORE: 18
ADLS_ACUITY_SCORE: 14
ADLS_ACUITY_SCORE: 14
ADLS_ACUITY_SCORE: 18

## 2018-10-30 NOTE — PROGRESS NOTES
"Pt seen for brief follow-up on wounds.  Unsure if topical 3M Cavilon Advanced product helped at all, just hurts.  Did carefully lift and trim loose hanging skin from buttocks today then covered new exposed areas (due to sloughing of blister roof,) with 3M Cavilon Advanced.  Continue current cares otherwise.  Once completed with radiation, advised radiation clinic she may try \"Dr. Edmundo walters Butt Cream\" which was formulated by a local physician and is compounded by the pharmacy. It is Dibucaine 1%, Desitin, and A&D ointment (30 grams each).  Concerned about using this prior to completion of radiation as contains zinc and removal may be uncomfortable (needs removal prior to radiation).  Unfortunately the morphine gel runs, could try applying adaptic coated with aquaphor over areas treated with gel and supporting with incontinence brief.    Coni Ibanez RN, CWOCN       "

## 2018-10-30 NOTE — PROGRESS NOTES
Radiation Oncology Progress Note    Identifier:   Mrs. Calix is a 68F with locally advanced squamous cell carcinoma of the anal canal undergoing definitive chemo-RT. Now admitted to the hospital with neutropenic fever, dehydration, and pain control.     Patient overall feels weak. She is being monitored for paroxysmal episodes of Afib. Undergoing potassium replacement. On Vancomycin.    Counts reviewed today demonstrated WBC 0.7 (ANC 0.4), Hgb 8, Plt 30.     A/P  Will continue to hold RT as counts remain low. Would consider resuming RT once ANC > 500 and Plt > 50.   Will continue to follow.       Edmundo Iqbal M.D.  Department of Radiation Oncology  [facility]

## 2018-10-30 NOTE — PROGRESS NOTES
[facility]      Subjective:  Could feel some afib over night--apparently brief rate to 170  Eating little  Stool is loose- not much  Pain - no change, did not radiation yesterday, will not be having it today.    Review of Systems:  CONSTITUTIONAL:weak  INTEGUMENTARY/SKIN: skin is bad  EYES: NEGATIVE for vision changes or irritation  ENT/MOUTH: NEGATIVE for ear, mouth and throat problems  RESP: NEGATIVE for significant cough or SOB  BREAST: NEGATIVE for masses, tenderness or discharge  CV: NEGATIVE for chest pain, palpitations or peripheral edema  GI: NEGATIVE for nausea, abdominal pain, heartburn, or change in bowel habits  : NEGATIVE for frequency, dysuria, or hematuria  MUSCULOSKELETAL: NEGATIVE for significant arthralgias or myalgia  NEURO: NEGATIVE for weakness, dizziness or paresthesias  ENDOCRINE: NEGATIVE for temperature intolerance, skin/hair changes  HEME: NEGATIVE for bleeding problems  PSYCHIATRIC: NEGATIVE for changes in mood or affect    Physical Exam:  Vitals Were Reviewed    Patient Vitals for the past 16 hrs:   BP Temp Temp src Pulse Heart Rate Resp SpO2 Weight   10/30/18 0958 - 98.9  F (37.2  C) Oral - - - - -   10/30/18 0840 107/52 - - 70 - 18 97 % 78.1 kg (172 lb 2.9 oz)   10/30/18 0800 - - - - 130 - - -   10/30/18 0000 - - - 97 - - - -   10/29/18 2321 133/51 99.4  F (37.4  C) Oral 70 - 20 97 % -         Intake/Output Summary (Last 24 hours) at 10/30/18 1111  Last data filed at 10/30/18 1010   Gross per 24 hour   Intake             2954 ml   Output             2350 ml   Net              604 ml       GENERAL APPEARANCE: healthy, alert and no distress  EYES: conjunctiva clear, eyes grossly normal  RESP: lungs clear to auscultation - no rales, rhonchi or wheezes  CV: regular rate and rhythm, normal S1 S2, no S3 or S4 and no murmur, click or rub   ABDOMEN: soft, nontender, no HSM or masses and bowel sounds normal  MS: below  SKIN: lots of epithelial blistering in buttox crack  and perineal area    Lab:  Recent Labs   Lab Test  10/30/18   0531  10/29/18   1710  10/29/18   0637   NA  141   --   139   POTASSIUM  3.2*  3.5  3.1*   CHLORIDE  109   --   107   CO2  23   --   24   ANIONGAP  9   --   8   GLC  96   --   97   BUN  5*   --   7   CR  0.46*   --   0.49*   BRUNO  7.6*   --   7.5*     CBC RESULTS:   Recent Labs   Lab Test  10/30/18   0531  10/29/18   0637   WBC  0.7*  0.5*   RBC  2.39*  2.65*   HGB  8.0*  8.8*   HCT  22.6*  25.4*   PLT  30*  43*       Results for orders placed or performed during the hospital encounter of 10/25/18 (from the past 24 hour(s))   Vancomycin level   Result Value Ref Range    Vancomycin Level 6.8 mg/L   Potassium   Result Value Ref Range    Potassium 3.5 3.4 - 5.3 mmol/L   CBC with platelets differential   Result Value Ref Range    WBC 0.7 (LL) 4.0 - 11.0 10e9/L    RBC Count 2.39 (L) 3.8 - 5.2 10e12/L    Hemoglobin 8.0 (L) 11.7 - 15.7 g/dL    Hematocrit 22.6 (L) 35.0 - 47.0 %    MCV 95 78 - 100 fl    MCH 33.5 (H) 26.5 - 33.0 pg    MCHC 35.4 31.5 - 36.5 g/dL    RDW 13.6 10.0 - 15.0 %    Platelet Count 30 (LL) 150 - 450 10e9/L    Diff Method Manual Differential     % Neutrophils 64.0 %    % Lymphocytes 14.0 %    % Monocytes 18.0 %    % Eosinophils 0.0 %    % Basophils 0.0 %    % Myelocytes 4.0 %    Absolute Neutrophil 0.4 (LL) 1.6 - 8.3 10e9/L    Absolute Lymphocytes 0.1 (L) 0.8 - 5.3 10e9/L    Absolute Monocytes 0.1 0.0 - 1.3 10e9/L    Absolute Eosinophils 0.0 0.0 - 0.7 10e9/L    Absolute Basophils 0.0 0.0 - 0.2 10e9/L    Absolute Myelocytes 0.0 0 10e9/L    RBC Morphology Normal     Platelet Estimate       Automated count confirmed.  Platelet morphology is normal.   Basic metabolic panel   Result Value Ref Range    Sodium 141 133 - 144 mmol/L    Potassium 3.2 (L) 3.4 - 5.3 mmol/L    Chloride 109 94 - 109 mmol/L    Carbon Dioxide 23 20 - 32 mmol/L    Anion Gap 9 3 - 14 mmol/L    Glucose 96 70 - 99 mg/dL    Urea Nitrogen 5 (L) 7 - 30 mg/dL    Creatinine 0.46 (L)  0.52 - 1.04 mg/dL    GFR Estimate >90 >60 mL/min/1.7m2    GFR Estimate If Black >90 >60 mL/min/1.7m2    Calcium 7.6 (L) 8.5 - 10.1 mg/dL       Assessment and Plan:    Jenise Calix is a 68 year old female who presented on 10/25/2018 with nausea, vomiting, diarrhea and anal pain due to chemoradiation therapy.      Chemotherapy induced nausea and vomiting and diarrhea  Much improved with hydration.. Diet advancing.  Improved, but anorexic      Squamous cell cancer of the anus   Undergoing radiation and has radiation dermatitis with significant pain. Pain control improving with addition of topical morphine.      - continue oxycodone 10 mg and hydromorphone for breakthrough   - silvadine and lidocaine topical   - continue morphine topical       Radiation induced skin burns    Neutropenic fever   ANC 0.3 10/28. Has not been neutropenic with past treatment, but did receive 5FU and mitomycin which can each suppress bone marrow. Spiked fever late 10/27 to 101.5 and reported to me this morning. Some tachycardia. No localizing symptoms of infection. LA last night 0.5.    Neupogen started 10/28  t max 100.0 on 10/30/18 , blood cultures NGTD, UC no growth, on cefepime/vanco, ANC 0.4      Pancytopenia due to chemotherapy  Thrombocytopenia, mild, chemo related.   Last chemo finished infusion 10/22.   Hgb not needing transfusion at this point. Platelets 30 k and holding aspirin. Neutropenic as above.       Paroxysmal atrial fibrillation  Tachycardia, possible atrial fibrillation with RVR 10/28   On aspirin at baseline.    Having episodes of tachycardia off and on since admit, some episodes last night, did not tolerated metoprolol in past, in persistent problem- would start short acting cardizem     Non-Severe / Mild or Moderate (protein-calorie) Malnutrition       DVT Prophylaxis: aspirin presently, watch platelets  Code Status: Full Code      Lines: PIV   Robledo catheter: None          Discussion: had been improving but now  fever since 10/28/18       Disposition: Originally admitted with N V D, this is better but pt is anorexic. Then developed neutropenic fever. Cultures neg. Significant radiation burns. Needs four more episodes of radiation.  Anticipate discharge once fever resolves and ANC > 500, continue antibiotics and neupogen. Follow afib. Add cardizem if episodes are persistent. May need tcu.

## 2018-10-30 NOTE — PLAN OF CARE
Problem: Pain, Acute (Adult)  Goal: Identify Related Risk Factors and Signs and Symptoms  Related risk factors and signs and symptoms are identified upon initiation of Human Response Clinical Practice Guideline (CPG).   Pt appears to get little or no relief from pain.  Pt cries out when voiding, will let us pat her to get it clean, but refuses any creams,  mepilex all fell off during night, it was usually soiled anyway.  Tele nurse called and said pt heart rate would jump between .  Pt states sometimes she feels it, and listening to an apical pulse, it is easy to hear the quick rhythm changes.  Her perirectal area looks so painful, bright red, edematous, peeling.  No drainage noted.  Pt has not rested well tonight.  ANIYA Prieto RN

## 2018-10-30 NOTE — PLAN OF CARE
Problem: Patient Care Overview  Goal: Plan of Care/Patient Progress Review  Outcome: No Change  DATE:  10/30/2018   TIME OF RECEIPT FROM LAB:  0854  LAB TEST:  WBC, Plat, ABS neut   LAB VALUE:  WBC 0.7, platelets 30, ABS neutro 0.4   RESULTS GIVEN  TO (PROVIDER) web paged :  Bimal Haley MD  TIME LAB VALUE REPORTED TO PROVIDER:   5337

## 2018-10-31 ENCOUNTER — APPOINTMENT (OUTPATIENT)
Dept: OCCUPATIONAL THERAPY | Facility: CLINIC | Age: 68
DRG: 393 | End: 2018-10-31
Attending: FAMILY MEDICINE
Payer: MEDICARE

## 2018-10-31 LAB
ANION GAP SERPL CALCULATED.3IONS-SCNC: 8 MMOL/L (ref 3–14)
BASOPHILS # BLD AUTO: 0 10E9/L (ref 0–0.2)
BASOPHILS NFR BLD AUTO: 0 %
BUN SERPL-MCNC: 8 MG/DL (ref 7–30)
CALCIUM SERPL-MCNC: 7.8 MG/DL (ref 8.5–10.1)
CHLORIDE SERPL-SCNC: 108 MMOL/L (ref 94–109)
CO2 SERPL-SCNC: 24 MMOL/L (ref 20–32)
CREAT SERPL-MCNC: 0.5 MG/DL (ref 0.52–1.04)
CREAT SERPL-MCNC: NORMAL MG/DL (ref 0.52–1.04)
DIFFERENTIAL METHOD BLD: ABNORMAL
EOSINOPHIL # BLD AUTO: 0 10E9/L (ref 0–0.7)
EOSINOPHIL NFR BLD AUTO: 0 %
ERYTHROCYTE [DISTWIDTH] IN BLOOD BY AUTOMATED COUNT: 13.9 % (ref 10–15)
GFR SERPL CREATININE-BSD FRML MDRD: >90 ML/MIN/1.7M2
GFR SERPL CREATININE-BSD FRML MDRD: NORMAL ML/MIN/1.7M2
GLUCOSE SERPL-MCNC: 103 MG/DL (ref 70–99)
HCT VFR BLD AUTO: 23.7 % (ref 35–47)
HGB BLD-MCNC: 8.1 G/DL (ref 11.7–15.7)
LYMPHOCYTES # BLD AUTO: 0.3 10E9/L (ref 0.8–5.3)
LYMPHOCYTES NFR BLD AUTO: 18 %
MCH RBC QN AUTO: 33.3 PG (ref 26.5–33)
MCHC RBC AUTO-ENTMCNC: 34.2 G/DL (ref 31.5–36.5)
MCV RBC AUTO: 98 FL (ref 78–100)
MONOCYTES # BLD AUTO: 0.1 10E9/L (ref 0–1.3)
MONOCYTES NFR BLD AUTO: 8 %
NEUTROPHILS # BLD AUTO: 1.3 10E9/L (ref 1.6–8.3)
NEUTROPHILS NFR BLD AUTO: 74 %
PLATELET # BLD AUTO: 23 10E9/L (ref 150–450)
PLATELET # BLD EST: ABNORMAL 10*3/UL
POTASSIUM SERPL-SCNC: 3.5 MMOL/L (ref 3.4–5.3)
POTASSIUM SERPL-SCNC: NORMAL MMOL/L (ref 3.4–5.3)
RBC # BLD AUTO: 2.43 10E12/L (ref 3.8–5.2)
RBC MORPH BLD: NORMAL
SODIUM SERPL-SCNC: 140 MMOL/L (ref 133–144)
WBC # BLD AUTO: 1.8 10E9/L (ref 4–11)

## 2018-10-31 PROCEDURE — 97165 OT EVAL LOW COMPLEX 30 MIN: CPT | Mod: GO

## 2018-10-31 PROCEDURE — 40000133 ZZH STATISTIC OT WARD VISIT

## 2018-10-31 PROCEDURE — 99233 SBSQ HOSP IP/OBS HIGH 50: CPT | Performed by: FAMILY MEDICINE

## 2018-10-31 PROCEDURE — 25000128 H RX IP 250 OP 636: Performed by: INTERNAL MEDICINE

## 2018-10-31 PROCEDURE — 12000007 ZZH R&B INTERMEDIATE

## 2018-10-31 PROCEDURE — 25000132 ZZH RX MED GY IP 250 OP 250 PS 637: Mod: GY | Performed by: FAMILY MEDICINE

## 2018-10-31 PROCEDURE — 85025 COMPLETE CBC W/AUTO DIFF WBC: CPT | Performed by: FAMILY MEDICINE

## 2018-10-31 PROCEDURE — 25000128 H RX IP 250 OP 636: Performed by: FAMILY MEDICINE

## 2018-10-31 PROCEDURE — 80048 BASIC METABOLIC PNL TOTAL CA: CPT | Performed by: FAMILY MEDICINE

## 2018-10-31 PROCEDURE — A9270 NON-COVERED ITEM OR SERVICE: HCPCS | Mod: GY | Performed by: FAMILY MEDICINE

## 2018-10-31 PROCEDURE — 97535 SELF CARE MNGMENT TRAINING: CPT | Mod: GO

## 2018-10-31 RX ADMIN — OXYCODONE HYDROCHLORIDE 10 MG: 5 TABLET ORAL at 04:17

## 2018-10-31 RX ADMIN — ACETAMINOPHEN 650 MG: 325 TABLET, FILM COATED ORAL at 20:08

## 2018-10-31 RX ADMIN — OXYCODONE HYDROCHLORIDE 10 MG: 5 TABLET ORAL at 15:47

## 2018-10-31 RX ADMIN — CEFEPIME HYDROCHLORIDE 2 G: 2 INJECTION, POWDER, FOR SOLUTION INTRAVENOUS at 04:14

## 2018-10-31 RX ADMIN — OXYCODONE HYDROCHLORIDE 10 MG: 5 TABLET ORAL at 09:33

## 2018-10-31 RX ADMIN — Medication: at 09:57

## 2018-10-31 RX ADMIN — SILVER SULFADIAZINE: 10 CREAM TOPICAL at 20:45

## 2018-10-31 RX ADMIN — VANCOMYCIN HYDROCHLORIDE 1000 MG: 1 INJECTION, SOLUTION INTRAVENOUS at 00:54

## 2018-10-31 RX ADMIN — Medication: at 16:52

## 2018-10-31 RX ADMIN — PROCHLORPERAZINE MALEATE 5 MG: 5 TABLET, FILM COATED ORAL at 12:34

## 2018-10-31 RX ADMIN — PROCHLORPERAZINE MALEATE 5 MG: 5 TABLET, FILM COATED ORAL at 05:58

## 2018-10-31 RX ADMIN — SODIUM CHLORIDE, PRESERVATIVE FREE: 5 INJECTION INTRAVENOUS at 09:11

## 2018-10-31 RX ADMIN — OXYCODONE HYDROCHLORIDE 10 MG: 5 TABLET ORAL at 20:38

## 2018-10-31 ASSESSMENT — ACTIVITIES OF DAILY LIVING (ADL)
ADLS_ACUITY_SCORE: 14
PREVIOUS_RESPONSIBILITIES: MEAL PREP;HOUSEKEEPING;LAUNDRY
ADLS_ACUITY_SCORE: 16
ADLS_ACUITY_SCORE: 14
ADLS_ACUITY_SCORE: 16
ADLS_ACUITY_SCORE: 16
ADLS_ACUITY_SCORE: 14

## 2018-10-31 NOTE — PLAN OF CARE
"Problem: Pain, Acute (Adult)  Goal: Identify Related Risk Factors and Signs and Symptoms  Related risk factors and signs and symptoms are identified upon initiation of Human Response Clinical Practice Guideline (CPG).   Outcome: Improving  Patient ate all eggs, bites of pancake and 1/2 magic cup for breakfast.  Taking in oral fluids okay.  Patient was medicated at 0600 with compazine to help prevent nausea at meal time.  Medicated with oxycodone 10 mg at 0933.  Patient reports pain is \"comfortably managed.\"    Wound cares to dimitris-area done.  Cleansed with dimitris-wipes gently and patted dry with disposable washcloth.  Morphine gel applied to labia/anal area.  Aquaphor applied to adaptic and placed over labia/anal area to help contain morphine gel.  Aquaphor also applied to intact red areas.  Skin is red with tan slough.  A bit more comfortable with urination today.      "

## 2018-10-31 NOTE — PLAN OF CARE
Problem: Pain, Acute (Adult)  Goal: Identify Related Risk Factors and Signs and Symptoms  Related risk factors and signs and symptoms are identified upon initiation of Human Response Clinical Practice Guideline (CPG).   Outcome: Improving  Patient alert and oriented. Reports pain has been more tolerable today with use of PRN oxycodone and MS gel x1. Continues to report nausea, encouraged antiemetic to improve meal acceptance and reduce sx of nausea. Compazine give per patient preference and has been effective. No loose stools. Voided x1 urine lois colored and pt endorses some hesitancy to void. Remains afebrile, denies any dysuria. Pt up in chair today and participated in hygiene cares. Applied aquaphor to intact reddened areas and MS gel to labia and anal areas per WOC direction, applied adaptic dressing to help keep MS gel in place due to constant moisture to areas. Pt tolerated wound cares with some discomfort.

## 2018-10-31 NOTE — PROGRESS NOTES
10/31/18 1300   Quick Adds   Type of Visit Initial Occupational Therapy Evaluation   Living Environment   Lives With alone   Living Arrangements mobile home   Home Accessibility tub/shower is not walk in   Number of Stairs to Enter Home 3  (B railings. )   Number of Stairs Within Home 0   Transportation Available family or friend will provide   Self-Care   Equipment Currently Used at Home none   Functional Level Prior   Ambulation 0-->independent   Transferring 0-->independent   Toileting 0-->independent   Bathing 0-->independent   Dressing 0-->independent   Eating 0-->independent   Communication 0-->understands/communicates without difficulty   Swallowing 0-->swallows foods/liquids without difficulty   Cognition 0 - no cognition issues reported   Fall history within last six months no   General Information   Onset of Illness/Injury or Date of Surgery - Date 10/25/18   Referring Physician Bimal Haley MD   Patient/Family Goals Statement To return home.    Additional Occupational Profile Info/Pertinent History of Current Problem Nausea, vomiting, diarrhea and pelvic pain.   Jenise Calix is a 68-year-old female who was diagnosed with anal cancer in 07/2018.  She also has a history of paroxysmal atrial fibrillation and tobacco use.  The patient has been recently treated at [facility] with chemoradiation.  She started radiation this fall and is doing a 6-week course of radiation, which will be done next Thursday   Precautions/Limitations immunosuppressed   Cognitive Status Examination   Orientation orientation to person, place and time   Level of Consciousness alert   Pain Assessment   Patient Currently in Pain Yes, see Vital Sign flowsheet  (perineal pain)   Range of Motion (ROM)   ROM Comment B UE ROM: WNL   Strength   Strength Comments generalized weakness. States this has been ongoing since radiation and states she thinks she will be able to manage at home with assist with IADLs. Not appropriate  "for exercises on this date d/t low platelets=23.    Transfer Skill: Bed to Chair/Chair to Bed   Level of [place]: Bed to Chair independent   Physical Assist/Nonphysical Assist: Bed to Chair supervision   Transfer Skill: Sit to Stand   Level of [place]: Sit/Stand independent   Physical Assist/Nonphysical Assist: Sit/Stand 1 person assist   Transfer Skill: Toilet Transfer   Level of [place]: Toilet independent   Physical Assist/Nonphysical Assist: Toilet 1 person assist   Upper Body Dressing   Level of [place]: Dress Upper Body independent   Lower Body Dressing   Level of [place]: Dress Lower Body independent   Physical Assist/Nonphysical Assist: Dress Lower Body supervision   Instrumental Activities of Daily Living (IADL)   Previous Responsibilities meal prep;housekeeping;laundry   IADL Comments states she has some people that can assist with meals.    Activities of Daily Living Analysis   Impairments Contributing to Impaired Activities of Daily Living strength decreased   General Therapy Interventions   Planned Therapy Interventions strengthening;progressive activity/exercise   Clinical Impression   Criteria for Skilled Therapeutic Interventions Met yes, treatment indicated   OT Diagnosis decreased activity tolerance for ADLs/IADLs   Influenced by the following impairments generalized weakness   Assessment of Occupational Performance 1-3 Performance Deficits   Identified Performance Deficits general activity tolerance for IADLs.    Clinical Decision Making (Complexity) Low complexity   Therapy Frequency 5 times/wk   Predicted Duration of Therapy Intervention (days/wks) 2-3days   Anticipated Discharge Disposition Home with Assist   Risks and Benefits of Treatment have been explained. Yes   Patient, Family & other staff in agreement with plan of care Yes   [facility] AM-PAC  \"6 Clicks\" Daily Activity Inpatient Short Form   1. Putting on and taking off regular lower body clothing? 4 - " None   2. Bathing (including washing, rinsing, drying)? 3 - A Little   3. Toileting, which includes using toilet, bedpan or urinal? 3 - A Little   4. Putting on and taking off regular upper body clothing? 4 - None   5. Taking care of personal grooming such as brushing teeth? 4 - None   6. Eating meals? 4 - None   Daily Activity Raw Score (Score out of 24.Lower scores equate to lower levels of function) 22   Total Evaluation Time   Total Evaluation Time (Minutes) 5

## 2018-10-31 NOTE — PLAN OF CARE
Problem: Patient Care Overview  Goal: Plan of Care/Patient Progress Review  Discharge Planner OT   Patient plan for discharge: Home with assist from friends/neighbors. Pt is really not wanting to go to TCU    Current status: Pt completes sit to stand, ambulates throughout room, lower body dressing all with SBA-independent and no adaptive device. Educated pt on energy conservation techniques for ADLs- pt states she is working on getting a commode to use at night.     Barriers to return to prior living situation: generalized weakness. Pt states she is feeling better.     Recommendations for discharge: Home with assist from friends/neighbors for IADLs.     Rationale for recommendations: Up with SBA. Steady on feet. Limited by generalized weakness.        Entered by: Sabi Puente 10/31/2018 2:24 PM

## 2018-10-31 NOTE — PROGRESS NOTES
SPIRITUAL HEALTH SERVICES  SPIRITUAL ASSESSMENT Progress Note  [facility] [place]    Referral Source:   Pt request through Charge August RIOS       Primary Focus:     Assessment of emotional/spiritual/Denominational distress    Support for coping    Illness Circumstances:   Reviewed documentation. Reflective conversation shared with patient which integrated elements of illness and family narratives.     Context of Serious Illness/Symptom(s) - Anal cancer; daily radiation    Resources for Support - Domenica stated her primary support comes from friends from her Bahai - and     Distress:     Emotional/Existential/Relational Distress - She stated today was better; she commented that her  had stopped unexpectedly to see her and that was appreciated    Spiritual/Scientology Distress - None addressed    Social/Cultural/Economic Distress - When asked, Domenica stated the concern comes in waves, at times, but she said she was doing OK    Spiritual / Scientology Coping:     Hoahaoism/Shelly - Tenriism    Spiritual Practice(s) - Very involved in her Bahai (Juan Galeano from [facility] near [place]); welcomed prayer and follow up visits    Emotional/Existential/Relational Connections - Domenica stated she appreciated the good care that she has been receiving; she stated that 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      Goals of Care:    Goals of Care - Possible TCU for a short break from chemo and radiation    Meaning/Sense-Making - Friends are deeply appreciated    Plan:  will provide follow up care during pt's MUKESH Looney M.A., [facility]  Staff [facility]  Office: [phone]  Cell: [phone]  Pager [phone]

## 2018-10-31 NOTE — PLAN OF CARE
Problem: Pain, Acute (Adult)  Goal: Identify Related Risk Factors and Signs and Symptoms  Related risk factors and signs and symptoms are identified upon initiation of Human Response Clinical Practice Guideline (CPG).   Outcome: Improving  Giving the oxycodone every 4-6 hours seems to be helping comfort.  Pt is taking on more of her own pericare.  Pt will not be able to do dressing changes, but is happy she can do more for herself.  Pt has been able to sleep well tonight.  ANIYA Prieto RN

## 2018-10-31 NOTE — PLAN OF CARE
Problem: Pain, Acute (Adult)  Goal: Identify Related Risk Factors and Signs and Symptoms  Related risk factors and signs and symptoms are identified upon initiation of Human Response Clinical Practice Guideline (CPG).   Outcome: Improving  Pt moing well today.  Pt took care of sown pericare tonight andwas more relaxed and confident.  Resting now.  ANIYA Prieto RN

## 2018-10-31 NOTE — PROGRESS NOTES
[facility]      Subjective:  Eating somewhat better  Still having lots of perineal pain.  No fever    Review of Systems:  CONSTITUTIONAL: NEGATIVE for fever, chills, change in weight  INTEGUMENTARY/SKIN: perineal burns  EYES: NEGATIVE for vision changes or irritation  ENT/MOUTH: NEGATIVE for ear, mouth and throat problems  RESP: NEGATIVE for significant cough or SOB  BREAST: NEGATIVE for masses, tenderness or discharge  CV: NEGATIVE for chest pain, palpitations or peripheral edema  GI: NEGATIVE for nausea, abdominal pain, heartburn, or change in bowel habits   female :pain, blistering  MUSCULOSKELETAL: NEGATIVE for significant arthralgias or myalgia  NEURO: NEGATIVE for weakness, dizziness or paresthesias  ENDOCRINE: NEGATIVE for temperature intolerance, skin/hair changes  HEME: NEGATIVE for bleeding problems  PSYCHIATRIC: NEGATIVE for changes in mood or affect    Physical Exam:  Vitals Were Reviewed    Patient Vitals for the past 16 hrs:   BP Temp Temp src Pulse Heart Rate Resp SpO2   10/31/18 1114 132/56 98.4  F (36.9  C) Oral 70 - 18 95 %   10/31/18 0954 - - - - 74 - -   10/31/18 0746 123/55 98.3  F (36.8  C) Oral 72 - 18 98 %   10/30/18 2255 129/59 98.4  F (36.9  C) Oral 71 - 18 98 %         Intake/Output Summary (Last 24 hours) at 10/31/18 1158  Last data filed at 10/31/18 0950   Gross per 24 hour   Intake             2514 ml   Output             2400 ml   Net              114 ml       GENERAL APPEARANCE: healthy, alert and no distress  EYES: conjunctiva clear, eyes grossly normal  RESP: lungs clear to auscultation - no rales, rhonchi or wheezes  CV: regular rate and rhythm, normal S1 S2, no S3 or S4 and no murmur, click or rub   ABDOMEN: soft, nontender, no HSM or masses and bowel sounds normal   (female): extensive blistering rash diaper distribution  MS: no clubbing, cyanosis; no edema  SKIN: clear without significant rashes or lesions    Lab:  Recent Labs   Lab Test  10/31/18    0515  10/30/18   1457  10/30/18   0531   NA  140   --   141   POTASSIUM  3.5  Canceled, Test credited  3.7  3.2*   CHLORIDE  108   --   109   CO2  24   --   23   ANIONGAP  8   --   9   GLC  103*   --   96   BUN  8   --   5*   CR  0.50*  Canceled, Test credited   --   0.46*   BRUNO  7.8*   --   7.6*     CBC RESULTS:   Recent Labs   Lab Test  10/31/18   0515  10/30/18   0531   WBC  1.8*  0.7*   RBC  2.43*  2.39*   HGB  8.1*  8.0*   HCT  23.7*  22.6*   PLT  23*  30*       Results for orders placed or performed during the hospital encounter of 10/25/18 (from the past 24 hour(s))   Potassium   Result Value Ref Range    Potassium 3.7 3.4 - 5.3 mmol/L   CBC with platelets differential   Result Value Ref Range    WBC 1.8 (L) 4.0 - 11.0 10e9/L    RBC Count 2.43 (L) 3.8 - 5.2 10e12/L    Hemoglobin 8.1 (L) 11.7 - 15.7 g/dL    Hematocrit 23.7 (L) 35.0 - 47.0 %    MCV 98 78 - 100 fl    MCH 33.3 (H) 26.5 - 33.0 pg    MCHC 34.2 31.5 - 36.5 g/dL    RDW 13.9 10.0 - 15.0 %    Platelet Count 23 (LL) 150 - 450 10e9/L    Diff Method Manual Differential     % Neutrophils 74.0 %    % Lymphocytes 18.0 %    % Monocytes 8.0 %    % Eosinophils 0.0 %    % Basophils 0.0 %    Absolute Neutrophil 1.3 (L) 1.6 - 8.3 10e9/L    Absolute Lymphocytes 0.3 (L) 0.8 - 5.3 10e9/L    Absolute Monocytes 0.1 0.0 - 1.3 10e9/L    Absolute Eosinophils 0.0 0.0 - 0.7 10e9/L    Absolute Basophils 0.0 0.0 - 0.2 10e9/L    RBC Morphology Normal     Platelet Estimate       Automated count confirmed.  Platelet morphology is normal.   Creatinine   Result Value Ref Range    Creatinine Canceled, Test credited 0.52 - 1.04 mg/dL    GFR Estimate Canceled, Test credited >60 mL/min/1.7m2    GFR Estimate If Black Canceled, Test credited >60 mL/min/1.7m2   Potassium   Result Value Ref Range    Potassium Canceled, Test credited 3.4 - 5.3 mmol/L   Basic metabolic panel   Result Value Ref Range    Sodium 140 133 - 144 mmol/L    Potassium 3.5 3.4 - 5.3 mmol/L    Chloride 108 94 - 109  mmol/L    Carbon Dioxide 24 20 - 32 mmol/L    Anion Gap 8 3 - 14 mmol/L    Glucose 103 (H) 70 - 99 mg/dL    Urea Nitrogen 8 7 - 30 mg/dL    Creatinine 0.50 (L) 0.52 - 1.04 mg/dL    GFR Estimate >90 >60 mL/min/1.7m2    GFR Estimate If Black >90 >60 mL/min/1.7m2    Calcium 7.8 (L) 8.5 - 10.1 mg/dL       Assessment and Plan:    Jenise Calix is a 68 year old female who presented on 10/25/2018 with nausea, vomiting, diarrhea and anal pain due to chemoradiation therapy.      Chemotherapy induced nausea and vomiting and diarrhea  Much improved with hydration.. Diet advancing.  Improved, but anorexic      Squamous cell cancer of the anus  Radiation induced skin burns on perineum and anal area  Has had some radiation sessions since admit-but now on hold    Undergoing radiation and has radiation dermatitis with significant pain. Pain control improving with addition of topical morphine.      - continue oxycodone 10 mg and hydromorphone for breakthrough   - silvadine and lidocaine topical   - continue morphine topical    Per radiation oncology-Would consider resuming RT once ANC > 500 and Plt > 50.  Needs four more treatments     Neutropenic fever   ANC 0.3 10/28. Has not been neutropenic with past treatment, but did receive 5FU and mitomycin which can each suppress bone marrow. Spiked fever late 10/27 to 101.5. No overt localized infection  Neupogen started 10/28  ANC on October 31, 2018 1.3--will stop neupogen, currently afebrile. Cultures neg so far. Stopping cefepime and vanco.        Pancytopenia due to chemotherapy  Last chemo finished infusion 10/22.   Hgb not needing transfusion at this point. Platelets 23 k and holding aspirin. Neutropenia improving as above.       Paroxysmal atrial fibrillation  Tachycardia, possible atrial fibrillation with RVR 10/28   On aspirin at baseline.    Having episodes of tachycardia off and on since admit,  did not tolerated metoprolol in past, in persistent problem- would start short  acting cardizem      Non-Severe / Mild or Moderate (protein-calorie) Malnutrition       DVT Prophylaxis: aspirin presently, watch platelets  Code Status: Full Code      Lines: PIV   Robledo catheter: None        Discussion: Originally admitted with N V D and radiation burns. Nausea and vomiting is better but pt is anorexic. Then patient developed neutropenic fever. Cultures neg.  ANC is now up. Stopping neupogen and antibiotics. Discussed with Dr Gunn today.. Significant radiation burns. Needs four more episodes of radiation.  Needs to get platelets above 50 k to get more.  Has had some bouts of afib (history of paroxsymal afib)--observing for now. Disposition is complex--if she goes to TCU she can't get radiation or chemo, home is far away. Will continue in patient care and hope platelets are high enough to get radiation tomorrow and or Friday.

## 2018-11-01 LAB
ANION GAP SERPL CALCULATED.3IONS-SCNC: 7 MMOL/L (ref 3–14)
BASOPHILS # BLD AUTO: 0 10E9/L (ref 0–0.2)
BASOPHILS NFR BLD AUTO: 0 %
BUN SERPL-MCNC: 7 MG/DL (ref 7–30)
CALCIUM SERPL-MCNC: 8 MG/DL (ref 8.5–10.1)
CHLORIDE SERPL-SCNC: 106 MMOL/L (ref 94–109)
CO2 SERPL-SCNC: 28 MMOL/L (ref 20–32)
CREAT SERPL-MCNC: 0.5 MG/DL (ref 0.52–1.04)
DIFFERENTIAL METHOD BLD: ABNORMAL
DOHLE BOD BLD QL SMEAR: PRESENT
EOSINOPHIL # BLD AUTO: 0 10E9/L (ref 0–0.7)
EOSINOPHIL NFR BLD AUTO: 0 %
ERYTHROCYTE [DISTWIDTH] IN BLOOD BY AUTOMATED COUNT: 13.9 % (ref 10–15)
GFR SERPL CREATININE-BSD FRML MDRD: >90 ML/MIN/1.7M2
GLUCOSE SERPL-MCNC: 93 MG/DL (ref 70–99)
HCT VFR BLD AUTO: 26.1 % (ref 35–47)
HGB BLD-MCNC: 9.2 G/DL (ref 11.7–15.7)
LYMPHOCYTES # BLD AUTO: 0.2 10E9/L (ref 0.8–5.3)
LYMPHOCYTES NFR BLD AUTO: 8 %
MCH RBC QN AUTO: 33.9 PG (ref 26.5–33)
MCHC RBC AUTO-ENTMCNC: 35.2 G/DL (ref 31.5–36.5)
MCV RBC AUTO: 96 FL (ref 78–100)
MONOCYTES # BLD AUTO: 0.2 10E9/L (ref 0–1.3)
MONOCYTES NFR BLD AUTO: 8 %
NEUTROPHILS # BLD AUTO: 2.6 10E9/L (ref 1.6–8.3)
NEUTROPHILS NFR BLD AUTO: 84 %
NRBC # BLD AUTO: 0 10*3/UL
NRBC BLD AUTO-RTO: 1 /100
PLATELET # BLD AUTO: 22 10E9/L (ref 150–450)
PLATELET # BLD EST: ABNORMAL 10*3/UL
POTASSIUM SERPL-SCNC: 3.7 MMOL/L (ref 3.4–5.3)
RBC # BLD AUTO: 2.71 10E12/L (ref 3.8–5.2)
RBC MORPH BLD: NORMAL
SODIUM SERPL-SCNC: 141 MMOL/L (ref 133–144)
WBC # BLD AUTO: 3.1 10E9/L (ref 4–11)

## 2018-11-01 PROCEDURE — A9270 NON-COVERED ITEM OR SERVICE: HCPCS | Mod: GY | Performed by: FAMILY MEDICINE

## 2018-11-01 PROCEDURE — 12000007 ZZH R&B INTERMEDIATE

## 2018-11-01 PROCEDURE — 25000132 ZZH RX MED GY IP 250 OP 250 PS 637: Mod: GY | Performed by: INTERNAL MEDICINE

## 2018-11-01 PROCEDURE — 25000128 H RX IP 250 OP 636: Performed by: FAMILY MEDICINE

## 2018-11-01 PROCEDURE — 25000132 ZZH RX MED GY IP 250 OP 250 PS 637: Mod: GY | Performed by: FAMILY MEDICINE

## 2018-11-01 PROCEDURE — 85025 COMPLETE CBC W/AUTO DIFF WBC: CPT | Performed by: FAMILY MEDICINE

## 2018-11-01 PROCEDURE — 80048 BASIC METABOLIC PNL TOTAL CA: CPT | Performed by: FAMILY MEDICINE

## 2018-11-01 PROCEDURE — 93005 ELECTROCARDIOGRAM TRACING: CPT

## 2018-11-01 PROCEDURE — A9270 NON-COVERED ITEM OR SERVICE: HCPCS | Mod: GY | Performed by: INTERNAL MEDICINE

## 2018-11-01 PROCEDURE — 99233 SBSQ HOSP IP/OBS HIGH 50: CPT | Performed by: FAMILY MEDICINE

## 2018-11-01 RX ORDER — POLYETHYLENE GLYCOL 3350 17 G/17G
17 POWDER, FOR SOLUTION ORAL DAILY PRN
Status: DISCONTINUED | OUTPATIENT
Start: 2018-11-01 | End: 2018-11-05 | Stop reason: HOSPADM

## 2018-11-01 RX ORDER — DILTIAZEM HYDROCHLORIDE 30 MG/1
30 TABLET, FILM COATED ORAL EVERY 6 HOURS SCHEDULED
Status: DISPENSED | OUTPATIENT
Start: 2018-11-01 | End: 2018-11-01

## 2018-11-01 RX ORDER — DILTIAZEM HYDROCHLORIDE 120 MG/1
120 CAPSULE, COATED, EXTENDED RELEASE ORAL DAILY
Status: DISCONTINUED | OUTPATIENT
Start: 2018-11-02 | End: 2018-11-02

## 2018-11-01 RX ADMIN — SODIUM CHLORIDE, PRESERVATIVE FREE 5 ML: 5 INJECTION INTRAVENOUS at 11:15

## 2018-11-01 RX ADMIN — OXYCODONE HYDROCHLORIDE 10 MG: 5 TABLET ORAL at 01:08

## 2018-11-01 RX ADMIN — SODIUM CHLORIDE, PRESERVATIVE FREE 5 ML: 5 INJECTION INTRAVENOUS at 15:13

## 2018-11-01 RX ADMIN — DILTIAZEM HYDROCHLORIDE 30 MG: 30 TABLET, FILM COATED ORAL at 01:46

## 2018-11-01 RX ADMIN — Medication: at 11:39

## 2018-11-01 RX ADMIN — OXYCODONE HYDROCHLORIDE 10 MG: 5 TABLET ORAL at 20:18

## 2018-11-01 RX ADMIN — POLYETHYLENE GLYCOL 3350 17 G: 17 POWDER, FOR SOLUTION ORAL at 15:13

## 2018-11-01 RX ADMIN — SODIUM CHLORIDE, PRESERVATIVE FREE: 5 INJECTION INTRAVENOUS at 01:48

## 2018-11-01 RX ADMIN — PROCHLORPERAZINE MALEATE 5 MG: 5 TABLET, FILM COATED ORAL at 05:50

## 2018-11-01 RX ADMIN — DILTIAZEM HYDROCHLORIDE 30 MG: 30 TABLET, FILM COATED ORAL at 12:56

## 2018-11-01 RX ADMIN — DILTIAZEM HYDROCHLORIDE 30 MG: 30 TABLET, FILM COATED ORAL at 18:02

## 2018-11-01 RX ADMIN — OXYCODONE HYDROCHLORIDE 10 MG: 5 TABLET ORAL at 14:08

## 2018-11-01 RX ADMIN — OXYCODONE HYDROCHLORIDE 10 MG: 5 TABLET ORAL at 05:51

## 2018-11-01 ASSESSMENT — ACTIVITIES OF DAILY LIVING (ADL)
ADLS_ACUITY_SCORE: 16

## 2018-11-01 NOTE — PROGRESS NOTES
[facility]    Hospital Medicine   Cross Cover Note  Date of Service: 11/1/2018     I was called due to tachycardia. Patient in and out of atrial fibrillation with RVR for > 1 hour, asymptomatic, no fever. Telemetry reviewed and -170's when in atrial fibrillation.    - trial diltiazem 30 mg q 6 hours   - continue telemetry  Enmanuel Jerez MD  [facility] Medicine

## 2018-11-01 NOTE — PLAN OF CARE
Problem: Patient Care Overview  Goal: Plan of Care/Patient Progress Review  PT: Upon first attempt patient getting EKG and second attempt patient out of room walking hallways at [facility] with nurse, will re-assess need for skilled therapy tomorrow

## 2018-11-01 NOTE — PLAN OF CARE
Problem: Patient Care Overview  Goal: Plan of Care/Patient Progress Review  OT: Pt up walking in hallway with RN with [facility]. Will attempt tomorrow as appropriate.

## 2018-11-01 NOTE — PLAN OF CARE
Problem: Patient Care Overview  Goal: Plan of Care/Patient Progress Review  Outcome: No Change  Patient started on Diltiazem. Written and verbal information given to the patient. Second dose of Diltiazem was held as HR 70's. Labs drawn from port. Patient needing to raise hand, move head and cough to get blood to return better. IVF at 50 ml/hr. Patient receiving Oxycodone for pain. Declined Morphine gel. Patient did request Silvadene cream last evening. Continues on telemetry.

## 2018-11-01 NOTE — PROGRESS NOTES
Care Transitions Progress Note:    Reason for Follow up:  Concerns raised about patient's ability to care for her radiation burns at home. Spoke with patient regarding home care services she is agreeable. Pt was provided with Medicare certified home care list. Pt chooses to use [facility] (Phone: [phone] Fax: [phone]).     Discharge Plan:  Home with home care    Discharge Planner   Discharge Plans in progress: Home with [facility] (Phone: [phone] Fax: [phone])  Barriers to discharge plan: clinical improvement  Follow up plan: per MD       Entered by: MARGARET NORTH 11/01/2018 1:28 PM       Aby Frederick, MSN, RN, Care Coordinator  [facility] [phone]  [facility] [phone]

## 2018-11-01 NOTE — PLAN OF CARE
Problem: Pain, Acute (Adult)  Goal: Acceptable Pain Control/Comfort Level  Patient will demonstrate the desired outcomes by discharge/transition of care.   Outcome: Improving  No episodes of Afib with RVR noted this shift. Pt up ambulating in halls reports pain tolerable with oxycodone and MS gel. Appetite and oral intake improving. Wound cares completed with MS gel to labia and anal areas and aquaphor to reddened intact areas. Discussed ways for patient to independently manage wound cares at home, pt believes she will be able to apply topical rx and cleanse independently. Plan for next wound cares for pt to complete with RN coaching and guidance. Hoping for discharge in the next day or so.

## 2018-11-01 NOTE — PROGRESS NOTES
CLINICAL NUTRITION SERVICES - REASSESSMENT NOTE    EVALUATION OF PROGRESS TOWARD GOALS   Diet:  Regular  Intake:  Poor, the patient reports that nothing tastes good. She is only taking bites of foods.      ASSESSED NUTRITION NEEDS:  Dosing Weight::  64 kg  Estimated energy needs:3157-0661 kcals (30-35 kcal/kg)  Estimated protein needs: 77-96 grams protein (1.2-1.5 grams/kg)      Previous Goals:   Advance diet when indicated. PO intake to improve to >50% of meals and 100% of supplements within 1-2 days.  Evaluation: Not met - the patient is eating less than 25% of her meals, she does not like the magic cup supplement    Previous Nutrition Diagnosis:   Inadequate oral intake related to decreased appetite, nausea, vomiting and diarrhea as evidenced by reports significantly less PO intake the last ~2 weeks.  Evaluation: No change      CURRENT NUTRITION DIAGNOSIS  Same as previous    INTERVENTIONS  Recommendations / Nutrition Prescription  The patient does not want Boost plus, will try Berry Boost Breeze ( no orange)    Implementation  Composition of Meals and Snacks and Medical Food Supplement    Goals  Patient to consume 25-50% of her meals in 2-3 days.      MONITORING AND EVALUATION:  Progress towards goals will be monitored and evaluated per protocol and Practice Guidelines, Food intake and Liquid meal replacement or supplement intake    Eugenia Alvarado RD,LD  Clinical Dietitian

## 2018-11-01 NOTE — PROGRESS NOTES
EKG confirms rapid Afib 140s. Tele RN states pt has been bouncing back & forth from sinus to AF. Dr. Meri aaron.

## 2018-11-01 NOTE — PROGRESS NOTES
"SUBJECTIVE:   Episode of A fib last eulalia, converted after slowing with oral dilt.   Pain is better  No fevers  Has some cough      ROS:4 point ROS including Respiratory, CV, GI and , other than that noted in the HPI,  is negative     OBJECTIVE:   /54 (BP Location: Right arm)  Pulse 70  Temp 99.2  F (37.3  C) (Oral)  Resp 18  Ht 1.702 m (5' 7\")  Wt 77.3 kg (170 lb 6.7 oz)  SpO2 97%  BMI 26.69 kg/m2    GENERAL APPEARANCE:  Alert, Ox3, NAD       RESP:clear      CV: regular rate and rhythm,  No  murmur , edema: 1+      Abdomen: soft, nontender, no liver or spleen enlargement, no masses, BSs normal   Skin: no cyanosis, pallor, or jaundice    CMP  Recent Labs  Lab 11/01/18  0545 10/31/18  0515 10/30/18  1457 10/30/18  0531  10/29/18  0637  10/26/18  0530    140  --  141  --  139  --  140   POTASSIUM 3.7 3.5  Canceled, Test credited 3.7 3.2*  < > 3.1*  --  4.0   CHLORIDE 106 108  --  109  --  107  --  107   CO2 28 24  --  23  --  24  --  22   ANIONGAP 7 8  --  9  --  8  --  11   GLC 93 103*  --  96  --  97  --  94   BUN 7 8  --  5*  --  7  --  11   CR 0.50* 0.50*  Canceled, Test credited  --  0.46*  --  0.49*  < > 0.51*   GFRESTIMATED >90 >90  Canceled, Test credited  --  >90  --  >90  < > >90   GFRESTBLACK >90 >90  Canceled, Test credited  --  >90  --  >90  < > >90   BRUNO 8.0* 7.8*  --  7.6*  --  7.5*  --  8.3*   PROTTOTAL  --   --   --   --   --   --   --  5.6*   ALBUMIN  --   --   --   --   --   --   --  2.4*   BILITOTAL  --   --   --   --   --   --   --  0.7   ALKPHOS  --   --   --   --   --   --   --  68   AST  --   --   --   --   --   --   --  13   ALT  --   --   --   --   --   --   --  17   < > = values in this interval not displayed.  CBC  Recent Labs  Lab 11/01/18  0545 10/31/18  0515 10/30/18  0531 10/29/18  0637   WBC 3.1* 1.8* 0.7* 0.5*   RBC 2.71* 2.43* 2.39* 2.65*   HGB 9.2* 8.1* 8.0* 8.8*   HCT 26.1* 23.7* 22.6* 25.4*   MCV 96 98 95 96   MCH 33.9* 33.3* 33.5* 33.2*   MCHC 35.2 34.2 " 35.4 34.6   RDW 13.9 13.9 13.6 13.3   PLT 22* 23* 30* 43*     INRNo lab results found in last 7 days.  Arterial BloodGas  No lab results found in last 7 days.   Venous Blood Gas  No lab results found in last 7 days.    Medications     [START ON 11/2/2018] diltiazem  120 mg Oral Daily     diltiazem  30 mg Oral Q6H [facility]     heparin  5 mL Intracatheter Q28 Days     heparin lock flush  5-10 mL Intracatheter Q24H     sodium chloride (PF)  10 mL Intracatheter Q8H       Intake/Output Summary (Last 24 hours) at 11/01/18 1452  Last data filed at 11/01/18 1235   Gross per 24 hour   Intake             1465 ml   Output             2200 ml   Net             -735 ml         ASSESSMENT: PLAN:   Assessment and Plan:     Jenise Calix is a 68 year old female who presented on 10/25/2018 with nausea, vomiting, diarrhea and anal pain due to chemoradiation therapy.      Chemotherapy induced nausea and vomiting and diarrhea  Much improved with hydration.. Diet advancing.  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 Pain control improving with addition of topical morphine.      - continue oxycodone 10 mg and hydromorphone for breakthrough   - silvadine and lidocaine topical   - continue morphine topical    Per radiation oncology-Would consider resuming RT once ANC > 500 and Plt > 50.  Needs four more treatments  -ANC ok but plts 22       Neutropenic fever   ANC 0.3 10/28. Has not been neutropenic with past treatment, but did receive 5FU and mitomycin which can each suppress bone marrow. Spiked fever late 10/27 to 101.5. No overt localized infection  Neupogen started 10/28  ANC on October 31, 2018 1.3--will stop neupogen, currently afebrile. Cultures neg so far. Stopping cefepime and vanco.  -resolved            Pancytopenia due to chemotherapy  Last chemo finished infusion 10/22.   Hgb not needing transfusion at this point. Platelets 23 k and holding aspirin. Neutropenia improving as above.   -plts still 22       Paroxysmal atrial fibrillation  Tachycardia, possible atrial fibrillation with RVR 10/28   On aspirin at baseline.    Having episodes of tachycardia off and on since admit,  did not tolerated metoprolol in past, in persistent problem- would start short acting cardizem  -had an episode last eulalia, resolved with cardizem.  Will start cardizem 24hr tomorrow.        Non-Severe / Mild or Moderate (protein-calorie) Malnutrition       DVT Prophylaxis: aspirin presently, watch platelets  Code Status: Full Code      Lines: PIV   Robledo catheter: None         Discussion: Originally admitted with N V D and radiation burns. Nausea and vomiting is better but pt is anorexic. Then patient developed neutropenic fever. Cultures neg.  ANC is now up. Stopping neupogen and antibiotics. Discussed with Dr Gunn  Significant radiation burns. Needs four more episodes of radiation.  Needs to get platelets above 50 k to get more.   Disposition is complex--if she goes to TCU she can't get radiation or chemo, home is far away.  Patient is learning to do her dressing changes.  If she can do it, would try at home.

## 2018-11-01 NOTE — PROGRESS NOTES
Tele RN called to tell us pt HR on monitor is rapid 170s-180s. Pt denies any cp or other symptoms, pulse oximeter bouncing from 60s-150s, unsure of accuracy. Awaiting EKG.

## 2018-11-02 LAB
ALBUMIN SERPL-MCNC: 2 G/DL (ref 3.4–5)
ALP SERPL-CCNC: 66 U/L (ref 40–150)
ALT SERPL W P-5'-P-CCNC: 12 U/L (ref 0–50)
ANION GAP SERPL CALCULATED.3IONS-SCNC: 6 MMOL/L (ref 3–14)
AST SERPL W P-5'-P-CCNC: 12 U/L (ref 0–45)
BASOPHILS # BLD AUTO: 0 10E9/L (ref 0–0.2)
BASOPHILS NFR BLD AUTO: 0 %
BILIRUB SERPL-MCNC: 0.5 MG/DL (ref 0.2–1.3)
BUN SERPL-MCNC: 6 MG/DL (ref 7–30)
CALCIUM SERPL-MCNC: 8 MG/DL (ref 8.5–10.1)
CHLORIDE SERPL-SCNC: 104 MMOL/L (ref 94–109)
CO2 SERPL-SCNC: 30 MMOL/L (ref 20–32)
CREAT SERPL-MCNC: 0.57 MG/DL (ref 0.52–1.04)
DIFFERENTIAL METHOD BLD: ABNORMAL
EOSINOPHIL # BLD AUTO: 0 10E9/L (ref 0–0.7)
EOSINOPHIL NFR BLD AUTO: 0.6 %
ERYTHROCYTE [DISTWIDTH] IN BLOOD BY AUTOMATED COUNT: 13.8 % (ref 10–15)
GFR SERPL CREATININE-BSD FRML MDRD: >90 ML/MIN/1.7M2
GLUCOSE SERPL-MCNC: 90 MG/DL (ref 70–99)
HCT VFR BLD AUTO: 24.2 % (ref 35–47)
HGB BLD-MCNC: 8.3 G/DL (ref 11.7–15.7)
IMM GRANULOCYTES # BLD: 0 10E9/L (ref 0–0.4)
IMM GRANULOCYTES NFR BLD: 1.7 %
LACTATE BLD-SCNC: 1 MMOL/L (ref 0.7–2)
LYMPHOCYTES # BLD AUTO: 0.2 10E9/L (ref 0.8–5.3)
LYMPHOCYTES NFR BLD AUTO: 8.7 %
MCH RBC QN AUTO: 32.8 PG (ref 26.5–33)
MCHC RBC AUTO-ENTMCNC: 34.3 G/DL (ref 31.5–36.5)
MCV RBC AUTO: 96 FL (ref 78–100)
MONOCYTES # BLD AUTO: 0.3 10E9/L (ref 0–1.3)
MONOCYTES NFR BLD AUTO: 15.6 %
NEUTROPHILS # BLD AUTO: 1.3 10E9/L (ref 1.6–8.3)
NEUTROPHILS NFR BLD AUTO: 73.4 %
NRBC # BLD AUTO: 0 10*3/UL
NRBC BLD AUTO-RTO: 0 /100
PLATELET # BLD AUTO: 20 10E9/L (ref 150–450)
POTASSIUM SERPL-SCNC: 3.4 MMOL/L (ref 3.4–5.3)
PROT SERPL-MCNC: 5 G/DL (ref 6.8–8.8)
RBC # BLD AUTO: 2.53 10E12/L (ref 3.8–5.2)
SODIUM SERPL-SCNC: 140 MMOL/L (ref 133–144)
WBC # BLD AUTO: 1.7 10E9/L (ref 4–11)

## 2018-11-02 PROCEDURE — 25000132 ZZH RX MED GY IP 250 OP 250 PS 637: Mod: GY | Performed by: FAMILY MEDICINE

## 2018-11-02 PROCEDURE — 36415 COLL VENOUS BLD VENIPUNCTURE: CPT | Performed by: FAMILY MEDICINE

## 2018-11-02 PROCEDURE — 93005 ELECTROCARDIOGRAM TRACING: CPT

## 2018-11-02 PROCEDURE — 12000007 ZZH R&B INTERMEDIATE

## 2018-11-02 PROCEDURE — 99233 SBSQ HOSP IP/OBS HIGH 50: CPT | Performed by: FAMILY MEDICINE

## 2018-11-02 PROCEDURE — 40000893 ZZH STATISTIC PT IP EVAL DEFER

## 2018-11-02 PROCEDURE — G0463 HOSPITAL OUTPT CLINIC VISIT: HCPCS

## 2018-11-02 PROCEDURE — A9270 NON-COVERED ITEM OR SERVICE: HCPCS | Mod: GY | Performed by: FAMILY MEDICINE

## 2018-11-02 PROCEDURE — 40000275 ZZH STATISTIC RCP TIME EA 10 MIN

## 2018-11-02 PROCEDURE — 25000128 H RX IP 250 OP 636: Performed by: FAMILY MEDICINE

## 2018-11-02 PROCEDURE — 80053 COMPREHEN METABOLIC PANEL: CPT | Performed by: FAMILY MEDICINE

## 2018-11-02 PROCEDURE — 85025 COMPLETE CBC W/AUTO DIFF WBC: CPT | Performed by: FAMILY MEDICINE

## 2018-11-02 PROCEDURE — 83605 ASSAY OF LACTIC ACID: CPT | Performed by: FAMILY MEDICINE

## 2018-11-02 RX ORDER — OXYCODONE HYDROCHLORIDE 10 MG/1
10 TABLET ORAL EVERY 4 HOURS PRN
Qty: 100 TABLET | Refills: 0 | Status: SHIPPED | OUTPATIENT
Start: 2018-11-02

## 2018-11-02 RX ORDER — DILTIAZEM HCL 60 MG
60 TABLET ORAL ONCE
Status: COMPLETED | OUTPATIENT
Start: 2018-11-02 | End: 2018-11-02

## 2018-11-02 RX ORDER — DILTIAZEM HYDROCHLORIDE 180 MG/1
180 CAPSULE, COATED, EXTENDED RELEASE ORAL DAILY
Status: DISCONTINUED | OUTPATIENT
Start: 2018-11-03 | End: 2018-11-05 | Stop reason: HOSPADM

## 2018-11-02 RX ADMIN — OXYCODONE HYDROCHLORIDE 10 MG: 5 TABLET ORAL at 02:21

## 2018-11-02 RX ADMIN — OXYCODONE HYDROCHLORIDE 10 MG: 5 TABLET ORAL at 16:38

## 2018-11-02 RX ADMIN — OXYCODONE HYDROCHLORIDE 10 MG: 5 TABLET ORAL at 23:03

## 2018-11-02 RX ADMIN — DILTIAZEM HYDROCHLORIDE 120 MG: 120 CAPSULE, COATED, EXTENDED RELEASE ORAL at 08:27

## 2018-11-02 RX ADMIN — OXYCODONE HYDROCHLORIDE 10 MG: 5 TABLET ORAL at 09:55

## 2018-11-02 RX ADMIN — DILTIAZEM HYDROCHLORIDE 60 MG: 60 TABLET, FILM COATED ORAL at 18:06

## 2018-11-02 RX ADMIN — SODIUM CHLORIDE, PRESERVATIVE FREE 5 ML: 5 INJECTION INTRAVENOUS at 14:30

## 2018-11-02 RX ADMIN — Medication: at 14:19

## 2018-11-02 ASSESSMENT — ACTIVITIES OF DAILY LIVING (ADL)
ADLS_ACUITY_SCORE: 16

## 2018-11-02 NOTE — PLAN OF CARE
Problem: Patient Care Overview  Goal: Plan of Care/Patient Progress Review  Occupational Therapy Discharge Summary    Reason for therapy discharge:    All goals and outcomes met, no further needs identified.    Progress towards therapy goal(s). See goals on Care Plan in [facility] electronic health record for goal details.  Goals met    Therapy recommendation(s):    Pt up independently and walking hallways. recommend walk 3-4x/day

## 2018-11-02 NOTE — PROGRESS NOTES
Pt seen briefly for follow-up.  States she is feeling a lot better, still holding last 4 radiation doses due to low platelets.  She is finding it helpful to use the oil emulsion gauze with Aquaphor over gel coated areas to better retain the gel.    Plan: I did cleanse skin of buttocks and dry then applied another coating of 3M Cavilon Advanced Protective Barrier to this area.  This should provide some barrier to this area for 3-7 days.  Pt may continue with morphine gel, oil emulsion gauze with adaptic to labial and perianal burns and rinsing with warm water via peribottle and patting dry with dry soft cloths, (Hima White Washcloths,) several times per day.  Plans to return home.  Will place order for more oil emulsion gauze to be sent to pt at home.  I have spoke with the radiation clinic nurse, Sissy, who may repeat application of the 3M barrier product as needed if skin declines again, (would need to obtain from wound care clinic).    Coni Ibanez RN, CWOCN

## 2018-11-02 NOTE — PLAN OF CARE
Problem: Pain, Acute (Adult)  Goal: Acceptable Pain Control/Comfort Level  Patient will demonstrate the desired outcomes by discharge/transition of care.   Outcome: No Change  Up indeply in room.   Pain controlled with oxycodone at this time.   Pt reports, she had applied aqua-phor at bedtime and was resting quietly, not wanting to disturb dimitris-area as comfortable.

## 2018-11-02 NOTE — PLAN OF CARE
"Problem: Patient Care Overview  Goal: Plan of Care/Patient Progress Review  Outcome: Improving  A&O. Up independently in room. Port is Heparin locked at this time. Oxy managing pain. Regular diet tolerated. No c/o nausea or vomiting this evening. Pt unable to receive radiation again until platelets are at 50 or above. Cuca area skin breakdown treated with warm tap water for cleansing and aquaphor and morphine gel for topical application. Encourage Pt to do herself. /56 (BP Location: Right arm)  Pulse 65  Temp 98.1  F (36.7  C) (Oral)  Resp 18  Ht 1.702 m (5' 7\")  Wt 77.3 kg (170 lb 6.7 oz)  SpO2 98%  BMI 26.69 kg/m2. Will continue to monitor.       "

## 2018-11-02 NOTE — PLAN OF CARE
Per discussion with nsg, OT and pt, pt is feeling better and up independently in her room. PT is no longer necessary at this time. Will complete orders.

## 2018-11-02 NOTE — PROGRESS NOTES
SPIRITUAL HEALTH SERVICES  SPIRITUAL ASSESSMENT Progress Note  [facility]    I provided follow up visit for Domenica.  She stated she had some cardiac symptoms that were being checked out.  She wasn't feeling the best.  I offered a short prayer and was followed into the room by tech doing an EKG.  I will continue to be available for ongoing support during Domenica's length of stay.    Chau Looney M.A., [facility]  Staff   [facility]  Office: [phone]  Cell: [phone]  Pager [phone]

## 2018-11-02 NOTE — PROGRESS NOTES
Care Transitions Progress Note:    Reason for Follow up:  Spoke with [facility] (Phone: [phone] Fax: [phone]) to clarify if they can draw labs for patient. They will be able to draw her labs. Updated them that patient will likely discharge 11/3    Discharge Plan:  Home with [facility] (Phone: [phone] Fax: [phone])    Discharge Planner   Discharge Plans in progress: Home with [facility] (Phone: [phone] Fax: [phone])  Barriers to discharge plan: clinical improvement  Follow up plan: per MD       Entered by: MARGARET NORTH 11/02/2018 2:26 PM       Aby Frederick, MSN, RN, Care Coordinator  [facility] [phone]  [facility] [phone]

## 2018-11-02 NOTE — PROGRESS NOTES
Notified that pt HR in th 180s.  Patient sleeping.  Radial pulse 160.  Pt denies feeling symptomatic.  VS obtained.  Sepsis BPA triggered.  Lactic = 1.0.

## 2018-11-02 NOTE — PROGRESS NOTES
"SUBJECTIVE:   Had another episode of tachycardia. Was up to 138, but completely asymptomatic. It appeared to be quite regular, but on reviewing the initial EKG it does appear to be flutter.  She's been able to change her dressings and apply her morphine gel.    She still feels quite weak. Appetite is still poor.     ROS:4 point ROS including Respiratory, CV, GI and , other than that noted in the HPI,  is negative     OBJECTIVE:   /50  Pulse 66  Temp 98.5  F (36.9  C) (Oral)  Resp 18  Ht 1.702 m (5' 7\")  Wt 76.6 kg (168 lb 14 oz)  SpO2 99%  BMI 26.45 kg/m2    GENERAL APPEARANCE:  Alert, oriented ×3, appears weak.     RESP:clear     CV: regular rate and rhythm,  No  murmur , edema: none      Abdomen: soft, nontender, no liver or spleen enlargement, no masses, BSs normal   Skin: no cyanosis, pallor, or jaundice     CMP  Recent Labs  Lab 11/02/18  0553 11/01/18  0545 10/31/18  0515 10/30/18  1457 10/30/18  0531    141 140  --  141   POTASSIUM 3.4 3.7 3.5  Canceled, Test credited 3.7 3.2*   CHLORIDE 104 106 108  --  109   CO2 30 28 24  --  23   ANIONGAP 6 7 8  --  9   GLC 90 93 103*  --  96   BUN 6* 7 8  --  5*   CR 0.57 0.50* 0.50*  Canceled, Test credited  --  0.46*   GFRESTIMATED >90 >90 >90  Canceled, Test credited  --  >90   GFRESTBLACK >90 >90 >90  Canceled, Test credited  --  >90   BRUNO 8.0* 8.0* 7.8*  --  7.6*   PROTTOTAL 5.0*  --   --   --   --    ALBUMIN 2.0*  --   --   --   --    BILITOTAL 0.5  --   --   --   --    ALKPHOS 66  --   --   --   --    AST 12  --   --   --   --    ALT 12  --   --   --   --      CBC  Recent Labs  Lab 11/02/18  0553 11/01/18  0545 10/31/18  0515 10/30/18  0531   WBC 1.7* 3.1* 1.8* 0.7*   RBC 2.53* 2.71* 2.43* 2.39*   HGB 8.3* 9.2* 8.1* 8.0*   HCT 24.2* 26.1* 23.7* 22.6*   MCV 96 96 98 95   MCH 32.8 33.9* 33.3* 33.5*   MCHC 34.3 35.2 34.2 35.4   RDW 13.8 13.9 13.9 13.6   PLT 20* 22* 23* 30*     INRNo lab results found in last 7 days.  Arterial BloodGas  No lab " results found in last 7 days.   Venous Blood Gas  No lab results found in last 7 days.    Medications     diltiazem  120 mg Oral Daily     heparin  5 mL Intracatheter Q28 Days     heparin lock flush  5-10 mL Intracatheter Q24H     sodium chloride (PF)  10 mL Intracatheter Q8H     No intake or output data in the 24 hours ending 11/02/18 1750      ASSESSMENT: PLAN:   Jenise Calix is a 68 year old female who presented on 10/25/2018 with nausea, vomiting, diarrhea and anal pain due to chemoradiation therapy.      Chemotherapy induced nausea and vomiting and diarrhea  Much improved with hydration.. Diet advancing.  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 Pain control improving with addition of topical morphine.      - continue oxycodone 10 mg and hydromorphone for breakthrough   - silvadine and lidocaine topical   - continue morphine topical    Per radiation oncology-Would consider resuming RT once ANC > 500 and Plt > 50.  Needs four more treatments  -her radiation oncologist saw her today and was still very concerned about her overall condition, he felt we needed to continue to watch her until her counts are better.        Neutropenic fever   ANC 0.3 10/28. Has not been neutropenic with past treatment, but did receive 5FU and mitomycin which can each suppress bone marrow. Spiked fever late 10/27 to 101.5. No overt localized infection  Neupogen started 10/28  ANC on October 31, 2018 1.3--will stop neupogen, currently afebrile. Cultures neg so far. Stopping cefepime and vanco.  -resolved Fever. Still some leukopenia. But has been above 1000 for 3 days.          Pancytopenia due to chemotherapy  Last chemo finished infusion 10/22.   Hgb not needing transfusion at this point. Platelets 23 k and  holding aspirin. Neutropenia improving as above.   -plts still 22       Paroxysmal atrial fibrillation  Tachycardia, possible atrial fibrillation with RVR 10/28   On aspirin at baseline.    Having episodes of tachycardia off and on since admit,  did not tolerated metoprolol in past, in persistent problem- would start short acting cardizem  -had an episode last eulalia, resolved with cardizem.  Will start cardizem 24hr tomorrow.    -had another episode on the 120 mg of diltiazem. We'll go up to 180.      Non-Severe / Mild or Moderate (protein-calorie) Malnutrition       DVT Prophylaxis: aspirin presently, watch platelets  Code Status: Full Code      Lines: PIV   Robledo catheter: None          Discussion: Originally admitted with N V D and radiation burns. Nausea and vomiting is better but pt is anorexic. Then patient developed neutropenic fever. Cultures neg.  ANC is now up. Stopping neupogen and antibiotics. Discussed with Dr Gunn  Significant radiation burns. Needs four more episodes of radiation.  Needs to get platelets above 50 k to get more.   Disposition is complex--if she goes to TCU she can't get radiation or chemo, home is far away.  Patient is learning to do her dressing changes.  If she can do it, would try at home.  likely will need 2 more days.

## 2018-11-02 NOTE — PROGRESS NOTES
Patient has  PA .09 QRS .28 QT .28  Pts RN notified , Dr. Mckinley also  notified of rhythm change.

## 2018-11-02 NOTE — PLAN OF CARE
Problem: Patient Care Overview  Goal: Plan of Care/Patient Progress Review  Outcome: Improving  Patient complaining of pain.  Given oxycodone with good relief.  Patient completed own wound care without difficulty with RN supervising.  Up in room independently.

## 2018-11-03 LAB
ANION GAP SERPL CALCULATED.3IONS-SCNC: 7 MMOL/L (ref 3–14)
BACTERIA SPEC CULT: NO GROWTH
BACTERIA SPEC CULT: NO GROWTH
BASOPHILS # BLD AUTO: 0 10E9/L (ref 0–0.2)
BASOPHILS NFR BLD AUTO: 0 %
BUN SERPL-MCNC: 9 MG/DL (ref 7–30)
CALCIUM SERPL-MCNC: 8.2 MG/DL (ref 8.5–10.1)
CHLORIDE SERPL-SCNC: 101 MMOL/L (ref 94–109)
CO2 SERPL-SCNC: 29 MMOL/L (ref 20–32)
CREAT SERPL-MCNC: 0.56 MG/DL (ref 0.52–1.04)
DIFFERENTIAL METHOD BLD: ABNORMAL
EOSINOPHIL # BLD AUTO: 0 10E9/L (ref 0–0.7)
EOSINOPHIL NFR BLD AUTO: 0 %
ERYTHROCYTE [DISTWIDTH] IN BLOOD BY AUTOMATED COUNT: 14 % (ref 10–15)
GFR SERPL CREATININE-BSD FRML MDRD: >90 ML/MIN/1.7M2
GLUCOSE SERPL-MCNC: 99 MG/DL (ref 70–99)
HCT VFR BLD AUTO: 24.3 % (ref 35–47)
HGB BLD-MCNC: 8.4 G/DL (ref 11.7–15.7)
HYPOCHROMIA BLD QL: PRESENT
LYMPHOCYTES # BLD AUTO: 0.3 10E9/L (ref 0.8–5.3)
LYMPHOCYTES NFR BLD AUTO: 17 %
Lab: NORMAL
Lab: NORMAL
MCH RBC QN AUTO: 33.1 PG (ref 26.5–33)
MCHC RBC AUTO-ENTMCNC: 34.6 G/DL (ref 31.5–36.5)
MCV RBC AUTO: 96 FL (ref 78–100)
METAMYELOCYTES # BLD: 0 10E9/L
METAMYELOCYTES NFR BLD MANUAL: 2 %
MONOCYTES # BLD AUTO: 0.2 10E9/L (ref 0–1.3)
MONOCYTES NFR BLD AUTO: 13 %
MYELOCYTES # BLD: 0 10E9/L
MYELOCYTES NFR BLD MANUAL: 2 %
NEUTROPHILS # BLD AUTO: 1.2 10E9/L (ref 1.6–8.3)
NEUTROPHILS NFR BLD AUTO: 66 %
PLATELET # BLD AUTO: 24 10E9/L (ref 150–450)
PLATELET # BLD EST: ABNORMAL 10*3/UL
POTASSIUM SERPL-SCNC: 3.7 MMOL/L (ref 3.4–5.3)
RBC # BLD AUTO: 2.54 10E12/L (ref 3.8–5.2)
SODIUM SERPL-SCNC: 137 MMOL/L (ref 133–144)
SPECIMEN SOURCE: NORMAL
SPECIMEN SOURCE: NORMAL
WBC # BLD AUTO: 1.8 10E9/L (ref 4–11)

## 2018-11-03 PROCEDURE — 80048 BASIC METABOLIC PNL TOTAL CA: CPT | Performed by: FAMILY MEDICINE

## 2018-11-03 PROCEDURE — 85025 COMPLETE CBC W/AUTO DIFF WBC: CPT | Performed by: FAMILY MEDICINE

## 2018-11-03 PROCEDURE — A9270 NON-COVERED ITEM OR SERVICE: HCPCS | Mod: GY | Performed by: FAMILY MEDICINE

## 2018-11-03 PROCEDURE — 99232 SBSQ HOSP IP/OBS MODERATE 35: CPT | Performed by: FAMILY MEDICINE

## 2018-11-03 PROCEDURE — 12000007 ZZH R&B INTERMEDIATE

## 2018-11-03 PROCEDURE — 25000132 ZZH RX MED GY IP 250 OP 250 PS 637: Mod: GY | Performed by: FAMILY MEDICINE

## 2018-11-03 PROCEDURE — 25000128 H RX IP 250 OP 636: Performed by: FAMILY MEDICINE

## 2018-11-03 RX ADMIN — POLYETHYLENE GLYCOL 3350 17 G: 17 POWDER, FOR SOLUTION ORAL at 11:39

## 2018-11-03 RX ADMIN — OXYCODONE HYDROCHLORIDE 10 MG: 5 TABLET ORAL at 20:13

## 2018-11-03 RX ADMIN — OXYCODONE HYDROCHLORIDE 10 MG: 5 TABLET ORAL at 11:39

## 2018-11-03 RX ADMIN — OXYCODONE HYDROCHLORIDE 10 MG: 5 TABLET ORAL at 05:15

## 2018-11-03 RX ADMIN — Medication: at 20:23

## 2018-11-03 RX ADMIN — SODIUM CHLORIDE, PRESERVATIVE FREE 5 ML: 5 INJECTION INTRAVENOUS at 13:52

## 2018-11-03 RX ADMIN — DILTIAZEM HYDROCHLORIDE 180 MG: 180 CAPSULE, COATED, EXTENDED RELEASE ORAL at 08:30

## 2018-11-03 ASSESSMENT — ACTIVITIES OF DAILY LIVING (ADL)
ADLS_ACUITY_SCORE: 16

## 2018-11-03 NOTE — PLAN OF CARE
Problem: Patient Care Overview  Goal: Plan of Care/Patient Progress Review  Outcome: Improving  Patient has had good relief with pain med. Patient up in room indep and voiding  well.patient is using the warm water rinse after voiding and the dry wipes. This nurse applied Aquaphor to bottom. .Patient had poor appetite this evening did not want much dinner only had bites. Patient can let needs be known and is up indep in room has call light within reach.

## 2018-11-03 NOTE — PLAN OF CARE
Problem: Patient Care Overview  Goal: Plan of Care/Patient Progress Review  Outcome: Improving  Vss, a/ox4, able to make needs known, +3 edema in LE bilaterally, perineum and buttocks red, peeling and painful, 10 mg oxycodone PRN administered. Tele NSR. No c/o nausea or diarrhea, no fevers. Up to bathroom independently. Will continue to monitor.

## 2018-11-03 NOTE — PLAN OF CARE
"Problem: Patient Care Overview  Goal: Plan of Care/Patient Progress Review  Outcome: Improving  Patient up in room independently.  Taking oxycodone 10 mg PRN for pain with good relief.  Reports she hasn't had a BM \"in a while\".  Given Miralax.  Patient hesitant, but agreeable to try.  Aquaphor applied to dimitris-area per patient.  Morphine gel used as needed.      "
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
## 2018-11-04 LAB
ANION GAP SERPL CALCULATED.3IONS-SCNC: 6 MMOL/L (ref 3–14)
BASOPHILS # BLD AUTO: 0 10E9/L (ref 0–0.2)
BASOPHILS NFR BLD AUTO: 0 %
BUN SERPL-MCNC: 8 MG/DL (ref 7–30)
CALCIUM SERPL-MCNC: 8.5 MG/DL (ref 8.5–10.1)
CHLORIDE SERPL-SCNC: 104 MMOL/L (ref 94–109)
CO2 SERPL-SCNC: 29 MMOL/L (ref 20–32)
CREAT SERPL-MCNC: 0.54 MG/DL (ref 0.52–1.04)
DIFFERENTIAL METHOD BLD: ABNORMAL
EOSINOPHIL # BLD AUTO: 0 10E9/L (ref 0–0.7)
EOSINOPHIL NFR BLD AUTO: 1 %
ERYTHROCYTE [DISTWIDTH] IN BLOOD BY AUTOMATED COUNT: 13.8 % (ref 10–15)
GFR SERPL CREATININE-BSD FRML MDRD: >90 ML/MIN/1.7M2
GLUCOSE SERPL-MCNC: 92 MG/DL (ref 70–99)
HCT VFR BLD AUTO: 25 % (ref 35–47)
HGB BLD-MCNC: 8.5 G/DL (ref 11.7–15.7)
LYMPHOCYTES # BLD AUTO: 0.3 10E9/L (ref 0.8–5.3)
LYMPHOCYTES NFR BLD AUTO: 21 %
MCH RBC QN AUTO: 32.6 PG (ref 26.5–33)
MCHC RBC AUTO-ENTMCNC: 34 G/DL (ref 31.5–36.5)
MCV RBC AUTO: 96 FL (ref 78–100)
MONOCYTES # BLD AUTO: 0.1 10E9/L (ref 0–1.3)
MONOCYTES NFR BLD AUTO: 10 %
NEUTROPHILS # BLD AUTO: 1 10E9/L (ref 1.6–8.3)
NEUTROPHILS NFR BLD AUTO: 68 %
PLATELET # BLD AUTO: 30 10E9/L (ref 150–450)
PLATELET # BLD EST: ABNORMAL 10*3/UL
POTASSIUM SERPL-SCNC: 4 MMOL/L (ref 3.4–5.3)
RBC # BLD AUTO: 2.61 10E12/L (ref 3.8–5.2)
RBC MORPH BLD: NORMAL
SODIUM SERPL-SCNC: 139 MMOL/L (ref 133–144)
WBC # BLD AUTO: 1.4 10E9/L (ref 4–11)

## 2018-11-04 PROCEDURE — 99233 SBSQ HOSP IP/OBS HIGH 50: CPT | Performed by: FAMILY MEDICINE

## 2018-11-04 PROCEDURE — 80048 BASIC METABOLIC PNL TOTAL CA: CPT | Performed by: FAMILY MEDICINE

## 2018-11-04 PROCEDURE — 25000128 H RX IP 250 OP 636: Performed by: FAMILY MEDICINE

## 2018-11-04 PROCEDURE — 25000132 ZZH RX MED GY IP 250 OP 250 PS 637: Mod: GY | Performed by: FAMILY MEDICINE

## 2018-11-04 PROCEDURE — 12000007 ZZH R&B INTERMEDIATE

## 2018-11-04 PROCEDURE — A9270 NON-COVERED ITEM OR SERVICE: HCPCS | Mod: GY | Performed by: FAMILY MEDICINE

## 2018-11-04 PROCEDURE — 85025 COMPLETE CBC W/AUTO DIFF WBC: CPT | Performed by: FAMILY MEDICINE

## 2018-11-04 RX ORDER — ACETAMINOPHEN 500 MG
1000 TABLET ORAL 3 TIMES DAILY
Status: DISCONTINUED | OUTPATIENT
Start: 2018-11-04 | End: 2018-11-05 | Stop reason: HOSPADM

## 2018-11-04 RX ORDER — ACETAMINOPHEN 500 MG
1000 TABLET ORAL 3 TIMES DAILY
COMMUNITY
Start: 2018-11-04 | End: 2018-12-20

## 2018-11-04 RX ORDER — DILTIAZEM HYDROCHLORIDE 180 MG/1
180 CAPSULE, COATED, EXTENDED RELEASE ORAL DAILY
Qty: 30 CAPSULE | Refills: 0 | Status: SHIPPED | OUTPATIENT
Start: 2018-11-05 | End: 2018-12-20

## 2018-11-04 RX ORDER — POLYETHYLENE GLYCOL 3350 17 G/17G
17 POWDER, FOR SOLUTION ORAL DAILY PRN
Qty: 7 PACKET | COMMUNITY
Start: 2018-11-04

## 2018-11-04 RX ADMIN — SODIUM CHLORIDE, PRESERVATIVE FREE 5 ML: 5 INJECTION INTRAVENOUS at 06:27

## 2018-11-04 RX ADMIN — OXYCODONE HYDROCHLORIDE 10 MG: 5 TABLET ORAL at 18:28

## 2018-11-04 RX ADMIN — OXYCODONE HYDROCHLORIDE 10 MG: 5 TABLET ORAL at 07:40

## 2018-11-04 RX ADMIN — DILTIAZEM HYDROCHLORIDE 180 MG: 180 CAPSULE, COATED, EXTENDED RELEASE ORAL at 07:40

## 2018-11-04 RX ADMIN — ACETAMINOPHEN 1000 MG: 500 TABLET ORAL at 10:02

## 2018-11-04 RX ADMIN — ACETAMINOPHEN 1000 MG: 500 TABLET ORAL at 19:21

## 2018-11-04 RX ADMIN — SODIUM CHLORIDE, PRESERVATIVE FREE 5 ML: 5 INJECTION INTRAVENOUS at 13:18

## 2018-11-04 RX ADMIN — OXYCODONE HYDROCHLORIDE 10 MG: 5 TABLET ORAL at 03:15

## 2018-11-04 RX ADMIN — ACETAMINOPHEN 1000 MG: 500 TABLET ORAL at 13:17

## 2018-11-04 ASSESSMENT — ACTIVITIES OF DAILY LIVING (ADL)
ADLS_ACUITY_SCORE: 16
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
## 2018-11-04 NOTE — PROGRESS NOTES
Patient c/o feet more swollen this evening call to on call. Patient feet look more swollen had patient elevated her feet and legs.
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
## 2018-11-04 NOTE — PLAN OF CARE
Problem: Patient Care Overview  Goal: Plan of Care/Patient Progress Review  Outcome: No Change  Patient reporting some blood on toilet tissue after using bathroom.  Skin noted to be red, moist, irritated with some drainage noted.  Patients reporting some increased pain, but declining PRN pain medication at this time.  Ambulated in hallway.  Reports appetite is improving somewhat.

## 2018-11-04 NOTE — PLAN OF CARE
"Problem: Pain, Acute (Adult)  Goal: Acceptable Pain Control/Comfort Level  Patient will demonstrate the desired outcomes by discharge/transition of care.   Outcome: Improving  Pt is alert, oriented, independent. Denies nausea, SOB. Prn oxycodone given for pain.  Taking in fluids and voiding well. /51 (BP Location: Right arm)  Pulse 64  Temp 98.7  F (37.1  C) (Oral)  Resp 18  Ht 1.702 m (5' 7\")  Wt 76.5 kg (168 lb 10.4 oz)  SpO2 94%  BMI 26.41 kg/m2        "

## 2018-11-05 ENCOUNTER — HOME INFUSION (PRE-WILLOW HOME INFUSION) (OUTPATIENT)
Dept: PHARMACY | Facility: CLINIC | Age: 68
End: 2018-11-05

## 2018-11-05 VITALS
HEIGHT: 67 IN | RESPIRATION RATE: 16 BRPM | OXYGEN SATURATION: 99 % | SYSTOLIC BLOOD PRESSURE: 127 MMHG | BODY MASS INDEX: 26.37 KG/M2 | WEIGHT: 167.99 LBS | DIASTOLIC BLOOD PRESSURE: 46 MMHG | HEART RATE: 82 BPM | TEMPERATURE: 98.2 F

## 2018-11-05 LAB
ANION GAP SERPL CALCULATED.3IONS-SCNC: 5 MMOL/L (ref 3–14)
BASOPHILS # BLD AUTO: 0 10E9/L (ref 0–0.2)
BASOPHILS NFR BLD AUTO: 0 %
BUN SERPL-MCNC: 12 MG/DL (ref 7–30)
CALCIUM SERPL-MCNC: 8.3 MG/DL (ref 8.5–10.1)
CHLORIDE SERPL-SCNC: 105 MMOL/L (ref 94–109)
CO2 SERPL-SCNC: 29 MMOL/L (ref 20–32)
CREAT SERPL-MCNC: 0.61 MG/DL (ref 0.52–1.04)
DIFFERENTIAL METHOD BLD: ABNORMAL
EOSINOPHIL # BLD AUTO: 0 10E9/L (ref 0–0.7)
EOSINOPHIL NFR BLD AUTO: 0 %
ERYTHROCYTE [DISTWIDTH] IN BLOOD BY AUTOMATED COUNT: 13.9 % (ref 10–15)
GFR SERPL CREATININE-BSD FRML MDRD: >90 ML/MIN/1.7M2
GLUCOSE SERPL-MCNC: 105 MG/DL (ref 70–99)
HCT VFR BLD AUTO: 22.1 % (ref 35–47)
HGB BLD-MCNC: 7.7 G/DL (ref 11.7–15.7)
HYPOCHROMIA BLD QL: PRESENT
LYMPHOCYTES # BLD AUTO: 0.2 10E9/L (ref 0.8–5.3)
LYMPHOCYTES NFR BLD AUTO: 13 %
MCH RBC QN AUTO: 33.9 PG (ref 26.5–33)
MCHC RBC AUTO-ENTMCNC: 34.8 G/DL (ref 31.5–36.5)
MCV RBC AUTO: 97 FL (ref 78–100)
MONOCYTES # BLD AUTO: 0.1 10E9/L (ref 0–1.3)
MONOCYTES NFR BLD AUTO: 8 %
MYELOCYTES # BLD: 0 10E9/L
MYELOCYTES NFR BLD MANUAL: 1 %
NEUTROPHILS # BLD AUTO: 1 10E9/L (ref 1.6–8.3)
NEUTROPHILS NFR BLD AUTO: 78 %
PLATELET # BLD AUTO: 35 10E9/L (ref 150–450)
PLATELET # BLD EST: ABNORMAL 10*3/UL
POTASSIUM SERPL-SCNC: 3.9 MMOL/L (ref 3.4–5.3)
RBC # BLD AUTO: 2.27 10E12/L (ref 3.8–5.2)
RBC MORPH BLD: NORMAL
SODIUM SERPL-SCNC: 139 MMOL/L (ref 133–144)
WBC # BLD AUTO: 1.3 10E9/L (ref 4–11)

## 2018-11-05 PROCEDURE — 99239 HOSP IP/OBS DSCHRG MGMT >30: CPT | Performed by: FAMILY MEDICINE

## 2018-11-05 PROCEDURE — 25000132 ZZH RX MED GY IP 250 OP 250 PS 637: Mod: GY | Performed by: FAMILY MEDICINE

## 2018-11-05 PROCEDURE — 80048 BASIC METABOLIC PNL TOTAL CA: CPT | Performed by: FAMILY MEDICINE

## 2018-11-05 PROCEDURE — 82565 ASSAY OF CREATININE: CPT | Performed by: FAMILY MEDICINE

## 2018-11-05 PROCEDURE — A9270 NON-COVERED ITEM OR SERVICE: HCPCS | Mod: GY | Performed by: FAMILY MEDICINE

## 2018-11-05 PROCEDURE — 85025 COMPLETE CBC W/AUTO DIFF WBC: CPT | Performed by: FAMILY MEDICINE

## 2018-11-05 PROCEDURE — 36415 COLL VENOUS BLD VENIPUNCTURE: CPT | Performed by: FAMILY MEDICINE

## 2018-11-05 PROCEDURE — 25000128 H RX IP 250 OP 636: Performed by: FAMILY MEDICINE

## 2018-11-05 RX ORDER — BISACODYL 10 MG
10 SUPPOSITORY, RECTAL RECTAL DAILY PRN
Status: DISCONTINUED | OUTPATIENT
Start: 2018-11-05 | End: 2018-11-05 | Stop reason: HOSPADM

## 2018-11-05 RX ADMIN — DILTIAZEM HYDROCHLORIDE 180 MG: 180 CAPSULE, COATED, EXTENDED RELEASE ORAL at 08:34

## 2018-11-05 RX ADMIN — SODIUM CHLORIDE, PRESERVATIVE FREE 5 ML: 5 INJECTION INTRAVENOUS at 09:39

## 2018-11-05 RX ADMIN — SODIUM CHLORIDE, PRESERVATIVE FREE 5 ML: 5 INJECTION INTRAVENOUS at 12:09

## 2018-11-05 RX ADMIN — ACETAMINOPHEN 1000 MG: 500 TABLET ORAL at 08:34

## 2018-11-05 RX ADMIN — BISACODYL 10 MG: 10 SUPPOSITORY RECTAL at 12:05

## 2018-11-05 RX ADMIN — ACETAMINOPHEN 1000 MG: 500 TABLET ORAL at 13:39

## 2018-11-05 RX ADMIN — POLYETHYLENE GLYCOL 3350 17 G: 17 POWDER, FOR SOLUTION ORAL at 08:33

## 2018-11-05 RX ADMIN — OXYCODONE HYDROCHLORIDE 10 MG: 5 TABLET ORAL at 02:34

## 2018-11-05 ASSESSMENT — ACTIVITIES OF DAILY LIVING (ADL)
ADLS_ACUITY_SCORE: 16

## 2018-11-05 NOTE — PLAN OF CARE
Problem: Patient Care Overview  Goal: Plan of Care/Patient Progress Review  Outcome: No Change  Patient is up ad faisal in room. Voiding without difficulty. Has not had a BM since 10/28. Medicated with miralax, prune juice provided. Bowel sounds active, passing flatus. Ate 50 % for breakfast. Patient doing dimitris cares independently using dimitris bottle and mk white washcloths. This nurse applied aquaphor to dimitris rectal area. Some yellow sloughing tissue was present. Patient does report improvement and is using morphine gel as needed. Declines the need for oxycodone this morning.   Was able to pull blood from port this morning, flushed with normal saline and heparin.

## 2018-11-05 NOTE — PROGRESS NOTES
Attempted to draw blood from port this am but only able to get about 1 ml of blood draw back.  Pt was sat on the edge of bed and position changes done with arms but no further blood was able to be withdrawn.  Lab to come get blood work.  Note left for rounding MD.

## 2018-11-05 NOTE — PLAN OF CARE
Problem: Patient Care Overview  Goal: Plan of Care/Patient Progress Review  Outcome: Improving  Patient up in o[place]nor and room.pt states her feet appear more swollen this evening than prior . This nurse did notice a slight amount of increase in swelling and suggested to patient to place her feet up on pillows to help decrease the swelling. Patient very compliant with staying in bed. Patient has been voiding well and did eat 75% of her dinner. Patient did request a oxy for pain. Patient has decrease in redness to labia/buttock area.pt is doing her own dressing changes and doing well. Patient has call light within reach all questions answered.

## 2018-11-05 NOTE — DISCHARGE INSTRUCTIONS
Patient has Morphine Gel that needs to be sent home with her in lock box.    If you are going to need a refill on your morphine gel, expect a 4 to 7 day delay after you drop the prescription off as [place] then [place].  Also check on the price--your insurance MAY NOT cover the cost.  Use it sparingly, and apply it ONLY around the opening to the vagina and rectum.  So request a refill EARLY. The compounding pharmacy is making up some more morphine gel for you. They will contact you 11/06/18 to discuss delivery options. Their number is [phone].    If you don't have a SITZ bath at home, go to your local medical supply store and consider buying one.  Or you can purchase one off of [employer].          Home care nurse can check blood counts to assess when you can resume radiation: goal platelets >50 and ANC>1500    For the pain:   -use tylenol 1000 mg 3 times/day  -add oxycodone 5mg every 4 hrs as needed  -morphine gel with dressing changes.     For the paroxysmal atrial flutter  -diltiazem 180 mg daily to slow the rate when they occur  -would follow up with cardiology to consider ablation at some point    Home care to check CBC Wednesday--if platelets are 50,000 or greater--notify radiation oncology to restart radiation.   If platelets are less than 50,000--recheck every other day until they are greater than 50,000.  If ANC is less than 1.0-----call Dr Gunn's office. And please update Dr Iqbal-radiation at [phone] as well.   Fax all results to [phone]

## 2018-11-05 NOTE — PROGRESS NOTES
Report called to GABRIEL Reese in SBAR format at [facility]. She is aware of lab orders and to fax results to Radiation/oncology.
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
## 2018-11-05 NOTE — PLAN OF CARE
"Problem: Pain, Acute (Adult)  Goal: Acceptable Pain Control/Comfort Level  Patient will demonstrate the desired outcomes by discharge/transition of care.   Outcome: Improving  Pt is alert, oriented, independent. Denies SOB, nausea. Pt edema to BLLE has decreased overnight to +2-+3. Prn oxycodone given for pain. Unable to get blood return on port this am. Pt has not had a bowel movement, patient is passing a lot of gas. /57 (BP Location: Right arm)  Pulse 58  Temp 98  F (36.7  C) (Oral)  Resp 18  Ht 1.702 m (5' 7\")  Wt 76.2 kg (167 lb 15.9 oz)  SpO2 98%  BMI 26.31 kg/m2        "

## 2018-11-06 ENCOUNTER — TELEPHONE (OUTPATIENT)
Dept: ONCOLOGY | Facility: CLINIC | Age: 68
End: 2018-11-06

## 2018-11-06 NOTE — DISCHARGE SUMMARY
Admit Date:     10/25/2018   Discharge Date:     11/05/2018      HISTORY OF PRESENT ILLNESS:  Jenise Calix is a 68-year-old female diagnosed with anal cancer in 07/2018.  She also has a history of paroxysmal atrial fibrillation and tobacco use.  She was treated recently here with chemoradiation.  She has started radiation this fall and was doing a six-week course of radiation.  She also has been getting a chemo pump.  She has had two episodes of this.  She receives mitomycin and 5-FU through the pump.  A second pump was placed 10/18/2018 and was removed 10/22/2018.  On 10/23/2018, she developed persistent nausea, vomiting, and intractable diarrhea.  She also developed redness and pain throughout her pelvic area from the radiation, with ulcerations in the perianal area and perineal area.      Upon admission, it was thought that she had nausea, vomiting, and diarrhea secondary to her chemotherapy and possibly also due to radiation.  She had a borderline neutropenia, with an absolute neutrophil count of 1.0.  She had chemo related thrombocytopenia, along with pelvic pain and ulcerations from radiation.      Initially, the patient needed IV narcotics.  She went on to have a long and complex hospitalization.      Ultimately, her nausea, vomiting, and diarrhea stopped.  She was left with anorexia.  This slowly improved.      She had significant radiation-induced skin burns on the perineum and anal area.  They worsened initially.  She did receive some radiation while she was in the hospital, but then her platelet count dropped and she was unable to receive more radiation.  The radiation burns did improve while she is in the hospital.      While in the hospital, she developed neutropenic fever.  Her absolute neutrophil count dropped down to 0.3.  She was started on empiric antibiotics and given Neupogen, until her absolute neutrophil count rebounded.  Her cultures were negative and ultimately she was taken off  antibiotics.      While in the hospital, she did have some episodes of tachycardia, with atrial fibrillation and RVR, that were short-acting.  She was put on Cardizem to control this and she is going to discharge on Cardizem.  She is not on anticoagulation and since her platelets are low she will not be able to take anticoagulation now.  She has been advised to consult with Cardiology as an outpatient to discuss ablation.      At the end of her hospital stay, she was more mobile.  Her pain was controlled.  Her radiation burns were improved.  Her platelet count was 35,000 and slowly increasing.  Her absolute neutrophil count on discharge was 1.      ASSESSMENT:   1.  Nausea, vomiting, and diarrhea, related to chemotherapy for anal cancer.   2.  Radiation-induced skin burns on the perineum and anal area, secondary to radiation treatment for anal cancer.   3.  Neutropenic fever.   4.  Pancytopenia related to chemotherapy.   5.  Known paroxysmal atrial fibrillation, with some episodes of atrial fibrillation and rapid ventricular response in the hospital, Cardizem started.   6.  Mild to moderate protein calorie malnutrition.      PLAN:  The patient is going to discharge to home.  She is going to have her CBC with differential checked on Wednesday.  If her platelet count is over 50,000, homecare is to notify radiation so she can complete her radiation.  If it is not over 50,000, they should check it every other day until it is and notify radiation when the platelets are over 50,000.  I have also asked them to notify Hematology (Dr. Peoples's office) if her absolute neutrophil count is less than 1.  She is to follow up with Cardiology sometime in the future.     Discharge Medication List as of 11/5/2018  3:59 PM      START taking these medications    Details   acetaminophen (TYLENOL) 500 MG tablet Take 2 tablets (1,000 mg) by mouth 3 times daily, OTC      diltiazem 180 MG 24 hr capsule Take 1 capsule (180 mg) by mouth  daily, Disp-30 capsule, R-0, Local Print      morphine 0.1% in solosite 0.1% topical gel Apply to rectal wound q4h PRN pain; to be mixed in Intrasite gel instead of Solosite., Disp-150 g, R-0, Local Print      oxyCODONE IR (ROXICODONE) 10 MG tablet Take 1 tablet (10 mg) by mouth every 4 hours as needed for moderate to severe pain, Disp-100 tablet, R-0, Local Print      polyethylene glycol (MIRALAX/GLYCOLAX) Packet Take 17 g by mouth daily as needed for constipation, Disp-7 packet, OTC         CONTINUE these medications which have NOT CHANGED    Details   aspirin 81 MG tablet Take 81 mg by mouth daily , Historical      prochlorperazine (COMPAZINE) 10 MG tablet Take 1 tablet (10 mg) by mouth every 6 hours as needed (Nausea/Vomiting), Disp-30 tablet, R-1, E-Prescribe      silver sulfADIAZINE (SILVADENE) 1 % cream Apply topically 2 times dailyDisp-25 g, [id number]      lidocaine, viscous, (XYLOCAINE) 2 % solution Take 15 mLs by mouth every 2 hours as needed for moderate pain swish and spit every 3-8 hours as needed; max 8 doses/24 hour period, Disp-100 mL, R-3, E-Prescribe      ondansetron (ZOFRAN) 4 MG tablet Take 1 tablet (4 mg) by mouth every 8 hours as needed for nausea, Disp-18 tablet, R-1, E-Prescribe         STOP taking these medications       lidocaine (XYLOCAINE) 2 % topical gel Comments:   Reason for Stopping:         oxyCODONE (OXY-IR) 5 MG capsule Comments:   Reason for Stopping:         morphine (MS CONTIN) 15 MG 12 hr tablet Comments:   Reason for Stopping:             Unresulted Labs Ordered in the Past 30 Days of this Admission     No orders found from 2018 to 10/26/2018.              Greater than 30 minutes spent on this.         VERONICA JONAS MD             D: 2018   T: 2018   MT: MARIA GUADALUPE      Name:     JANNIE MCINTYRE   MRN:      [id number]        Account:        [id number]   :      1950           Admit Date:     10/25/2018                                  Discharge  Date: 11/05/2018      Document: [id number]

## 2018-11-06 NOTE — PLAN OF CARE
Problem: Patient Care Overview  Goal: Plan of Care/Patient Progress Review  Outcome: Adequate for Discharge Date Met: 11/05/18  [facility] DISCHARGE NOTE    Patient discharged to home at 5:00 PM via wheel chair. Accompanied by other:friend and staff. Discharge instructions reviewed with patient, opportunity offered to ask questions. Prescriptions sent to patients preferred pharmacy. All belongings sent with patient.    Alona Cochran

## 2018-11-06 NOTE — TELEPHONE ENCOUNTER
"Kelley RIOS from [facility] called. She wanted to inform you that she did the \"start of care\". She will do the blood draw tomorrow.    She also wanted to know your thoughts about medication drug interactions:  Diltiazem and Oxycodone  Morphine gel and Oxycodone  Are you ok with patient continuing with these medications? April would like to be called back at [phone]. Will route to Dr. Gunn for recommendations on medications.  Rupa Benson called back this morning. She maya her labs this morning. She is wondering if you would like to have labs drawn every M, W, F and do you want them to do port cares?  "

## 2018-11-07 ENCOUNTER — TRANSFERRED RECORDS (OUTPATIENT)
Dept: HEALTH INFORMATION MANAGEMENT | Facility: CLINIC | Age: 68
End: 2018-11-07

## 2018-11-07 NOTE — TELEPHONE ENCOUNTER
Per Dr. Gunn, patient can have labs drawn 2 times per week.    Will route medication interaction questions to Rupa Harman.

## 2018-11-08 ENCOUNTER — TELEPHONE (OUTPATIENT)
Dept: RADIATION THERAPY | Facility: OUTPATIENT CENTER | Age: 68
End: 2018-11-08

## 2018-11-08 NOTE — TELEPHONE ENCOUNTER
Dr. Iqbal made call to Jenise at home this morning. Her plts had improved to 93 and all other labs are stable (labs to be scanned in from home care). Dr. Iqbal had called on Wednesday as well as he feels that Jenise needs to restart and complete radiation ( 4 treatments left). Dr. Iqbal reviewed importance of completing therapy and that based on her reported symptoms and labs she is stable to restart. Jenise declined to come in this week. She was hesitant to finish radiation but after discussion she agreed to come in on Monday 11/12/18 to finish radiation therapy.   This RN will update med onc team.

## 2018-11-09 ENCOUNTER — TRANSFERRED RECORDS (OUTPATIENT)
Dept: HEALTH INFORMATION MANAGEMENT | Facility: CLINIC | Age: 68
End: 2018-11-09

## 2018-11-12 ENCOUNTER — APPOINTMENT (OUTPATIENT)
Dept: RADIATION THERAPY | Facility: OUTPATIENT CENTER | Age: 68
End: 2018-11-12
Payer: COMMERCIAL

## 2018-11-13 ENCOUNTER — APPOINTMENT (OUTPATIENT)
Dept: RADIATION THERAPY | Facility: OUTPATIENT CENTER | Age: 68
End: 2018-11-13
Payer: COMMERCIAL

## 2018-11-13 NOTE — PROGRESS NOTES
This is a recent snapshot of the patient's [facility] medical record.  For current drug dose and complete information and questions, call [phone] or In Basket pool, [facility] ([id number])  CSN Number:  [id number]

## 2018-11-14 ENCOUNTER — OFFICE VISIT (OUTPATIENT)
Dept: RADIATION THERAPY | Facility: OUTPATIENT CENTER | Age: 68
End: 2018-11-14
Payer: COMMERCIAL

## 2018-11-14 ENCOUNTER — APPOINTMENT (OUTPATIENT)
Dept: RADIATION THERAPY | Facility: OUTPATIENT CENTER | Age: 68
End: 2018-11-14
Payer: COMMERCIAL

## 2018-11-14 VITALS
SYSTOLIC BLOOD PRESSURE: 100 MMHG | BODY MASS INDEX: 24.43 KG/M2 | WEIGHT: 156 LBS | HEART RATE: 71 BPM | DIASTOLIC BLOOD PRESSURE: 67 MMHG

## 2018-11-14 DIAGNOSIS — C21.1 MALIGNANT NEOPLASM OF ANAL CANAL (H): Primary | ICD-10-CM

## 2018-11-14 NOTE — LETTER
2018      RE: Jenise Calix  [id number]  [place] [address]       [facility] PHYSICIANS  SPECIALIZING IN BREAKTHROUGHS  Radiation Oncology    On Treatment Visit Note      Jenise Calix      Date: 10/26/2018   MRN: [id number]   : 1950         Reason for Visit:  On Radiation Treatment Visit     Treatment Summary to Date   Anal canal/ Pelvic + Inguinal nodes  5220cGy/ 5400 cGy            Subjective:   Patient is overall doing better since returning home after admission for neutropenia and dehydration. Energy improving. No loose stool. Intermittent pain meds and morphine gel for skin pain. Applying aquaphor.       Nursing ROS:         Objective:   There were no vitals taken for this visit.  No apparent distress.  Skin - Significantly improved moist desquamation in dimitris-anal area and vaginal introitus   Anal canal - protruding mass significantly decreased since starting RT.     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     Plan:   1. Continue current therapy.  EOT tomorrow.   2. Continue skin care.  3. Will plan to follow up in 2 weeks.   4. Counseled patient on post-RT treatment effects.   5. Will need to order MRI/PET 4-8 weeks from completion of treatment and refer to surgery for endoscopic evaluation.      Qewziq chart and setup information reviewed  Ports checked      Edmundo Iqbal MD

## 2018-11-14 NOTE — MR AVS SNAPSHOT
After Visit Summary   11/14/2018    Jenise Calix    MRN: [id number]           Patient Information     Date Of Birth          1950        Visit Information        Provider Department      11/14/2018 4:20 PM Edmundo Iqbal MD [facility]         Follow-ups after your visit        Your next 10 appointments already scheduled     Nov 15, 2018 12:10 PM CST   Linear Accelerator with [facility]   [facility] ([facility])    [address], [address]  [place] [address]   [phone]            Dec 03, 2018  3:00 PM CST   Return Visit with Edmundo Iqbal MD   [facility] ([facility])    [address], [address]  [place] [address]   [phone]              Who to contact     Please call your clinic at [phone] to:    Ask questions about your health    Make or cancel appointments    Discuss your medicines    Learn about your test results    Speak to your doctor            Additional Information About Your Visit        Care EveryWhere ID     This is your Care EveryWhere ID. This could be used by other organizations to access your [facility] medical records  [id number]        Your Vitals Were     Pulse BMI (Body Mass Index)                71 24.43 kg/m2           Blood Pressure from Last 3 Encounters:   11/14/18 100/67   11/05/18 127/46   10/25/18 110/68    Weight from Last 3 Encounters:   11/14/18 70.8 kg (156 lb)   11/05/18 76.2 kg (167 lb 15.9 oz)   10/25/18 69.4 kg (153 lb 1.6 oz)              Today, you had the following     No orders found for display       Primary Care Provider Fax #    Physician No Ref-Primary [phone]       No address on file        Equal Access to Services     AGUSTIN HAMILTON : Radha Brown, denise mon, katina rand. So [facility] [phone].    ATENCIÓN: Si habla español, tiene a sanchez disposición servicios  renee de asistencia lingüística. Chalino beckett [phone].    We comply with applicable federal civil rights laws and [place] laws. We do not discriminate on the basis of race, color, national origin, age, disability, sex, sexual orientation, or gender identity.            Thank you!     Thank you for choosing [facility]  for your care. Our goal is always to provide you with excellent care. Hearing back from our patients is one way we can continue to improve our services. Please take a few minutes to complete the written survey that you may receive in the mail after your visit with us. Thank you!             Your Updated Medication List - Protect others around you: Learn how to safely use, store and throw away your medicines at www.disposemymeds.org.          This list is accurate as of 11/14/18  4:23 PM.  Always use your most recent med list.                   Brand Name Dispense Instructions for use Diagnosis    acetaminophen 500 MG tablet    TYLENOL     Take 2 tablets (1,000 mg) by mouth 3 times daily    Radiation burn       aspirin 81 MG tablet      Take 81 mg by mouth daily        diltiazem 180 MG 24 hr capsule     30 capsule    Take 1 capsule (180 mg) by mouth daily    Paroxysmal atrial flutter (H)       lidocaine (viscous) 2 % solution    XYLOCAINE    100 mL    Take 15 mLs by mouth every 2 hours as needed for moderate pain swish and spit every 3-8 hours as needed; max 8 doses/24 hour period    Mucositis due to chemotherapy       morphine 0.1% in solosite 0.1% topical gel     150 g    Apply to rectal wound q4h PRN pain; to be mixed in Intrasite gel instead of Solosite.    Malignant neoplasm of anal canal (H), Radiation burn       ondansetron 4 MG tablet    ZOFRAN    18 tablet    Take 1 tablet (4 mg) by mouth every 8 hours as needed for nausea    Nausea       oxyCODONE IR 10 MG tablet    ROXICODONE    100 tablet    Take 1 tablet (10 mg) by mouth every 4 hours as needed for moderate to severe  pain    Malignant neoplasm of anal canal (H), Radiation burn       polyethylene glycol Packet    MIRALAX/GLYCOLAX    7 packet    Take 17 g by mouth daily as needed for constipation    Malignant neoplasm of anal canal (H)       prochlorperazine 10 MG tablet    COMPAZINE    30 tablet    Take 1 tablet (10 mg) by mouth every 6 hours as needed (Nausea/Vomiting)    Malignant neoplasm of anal canal (H)       silver sulfADIAZINE 1 % cream    SILVADENE    25 g    Apply topically 2 times daily    Malignant neoplasm of anal canal (H)

## 2018-11-14 NOTE — TELEPHONE ENCOUNTER
Per Dr. Gunn. Patient can have port cares done monthly by [facility]. Order placed. Ander from [facility] was notified.  Rupa Plascencia RN

## 2018-11-14 NOTE — PROGRESS NOTES
[facility] PHYSICIANS  SPECIALIZING IN BREAKTHROUGHS  Radiation Oncology    On Treatment Visit Note      Jenise Calix      Date: 10/26/2018   MRN: [id number]   : 1950         Reason for Visit:  On Radiation Treatment Visit     Treatment Summary to Date   Anal canal/ Pelvic + Inguinal nodes  5220cGy/ 5400 cGy            Subjective:   Patient is overall doing better since returning home after admission for neutropenia and dehydration. Energy improving. No loose stool. Intermittent pain meds and morphine gel for skin pain. Applying aquaphor.       Nursing ROS:         Objective:   There were no vitals taken for this visit.  No apparent distress.  Skin - Significantly improved moist desquamation in dimitris-anal area and vaginal introitus   Anal canal - protruding mass significantly decreased since starting RT.     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     Plan:   1. Continue current therapy.  EOT tomorrow.   2. Continue skin care.  3. Will plan to follow up in 2 weeks.   4. Counseled patient on post-RT treatment effects.   5. Will need to order MRI/PET 4-8 weeks from completion of treatment and refer to surgery for endoscopic evaluation.      Mosaiq chart and setup information reviewed  Ports checked      Edmundo Iqbal MD

## 2018-11-15 ENCOUNTER — APPOINTMENT (OUTPATIENT)
Dept: RADIATION THERAPY | Facility: OUTPATIENT CENTER | Age: 68
End: 2018-11-15
Payer: COMMERCIAL

## 2018-11-15 ENCOUNTER — HOME INFUSION (PRE-WILLOW HOME INFUSION) (OUTPATIENT)
Dept: PHARMACY | Facility: CLINIC | Age: 68
End: 2018-11-15

## 2018-11-16 ENCOUNTER — HOME INFUSION (PRE-WILLOW HOME INFUSION) (OUTPATIENT)
Dept: PHARMACY | Facility: CLINIC | Age: 68
End: 2018-11-16

## 2018-11-16 ENCOUNTER — TRANSFERRED RECORDS (OUTPATIENT)
Dept: HEALTH INFORMATION MANAGEMENT | Facility: CLINIC | Age: 68
End: 2018-11-16

## 2018-11-16 NOTE — PROGRESS NOTES
This is a recent snapshot of the patient's [facility] medical record.  For current drug dose and complete information and questions, call [phone] or In Basket pool, [facility] ([id number])  CSN Number:  [id number]

## 2018-11-19 NOTE — PROGRESS NOTES
This is a recent snapshot of the patient's [facility] medical record.  For current drug dose and complete information and questions, call [phone] or In Basket pool, [facility] ([id number])  CSN Number:  [id number]

## 2018-11-20 ENCOUNTER — DOCUMENTATION ONLY (OUTPATIENT)
Dept: RADIATION THERAPY | Facility: OUTPATIENT CENTER | Age: 68
End: 2018-11-20

## 2018-11-20 NOTE — PROGRESS NOTES
Radiotherapy Treatment Summary              PATIENT: Jenise Calix  MEDICAL RECORD NO: [id number]   : 1950    DIAGNOSIS: Squamous cell carcinoma of the anal canal   INTENT OF RADIOTHERAPY: definitive CRT  PATHOLOGY:   moderately differentiated nonkeratinizing invasive squamous cell carcinoma.                               STAGE: cT3-4N1  CONCURRENT SYSTEMIC THERAPY:  5FU/MMC      ONCOLOGIC HISTORY:    The patient has a long-standing history of hemorrhoids for several years.  She was initially seen by Dr. Becerra who recommended hemorrhoidectomy in 2017 due to large and symptomatic hemorrhoids.  The patient initially elected to postpone the surgery at the time.  She continued to have clinical symptoms of constipation, pain with bowel movement and, intermittent rectal bleeding. More recently on 2018 she underwent flexible sigmoidoscopy.  Findings demonstrated a severely ulcerated lesion at the 12 o'clock position with definite irregularity and fullness at the 6 o'clock position.  Biopsies of the lesion at the 12 o'clock position demonstrated moderately differentiated nonkeratinizing invasive squamous cell carcinoma.  Biopsy of the 6 o'clock lesion demonstrated at least squamous cell carcinoma in situ, cannot exclude invasion.  On 2018 the patient underwent a CT of the chest abdomen and pelvis.  CT scan demonstrated a mass involving the anus and rectum.  The mass was noted to be measuring 4 cm in diameter by 7 cm in the longest axis.  There was associated abnormal soft tissue extension anteriorly measuring 5 cm in the anterior to posterior direction, and 10 cm in the long axis.  This was noted to be inseparable from the floor the urinary bladder and extending to the skin surface, and engulfing the vagina and urethra.  There is also noted a 3 cm x 1.3 cm left inguinal groin node as well as enlarged perirectal nodes.  On 2018 the patient was seen by Dr. Gunn who discussed the  potential role of definitive chemoradiation therapy for treatment of locally advanced anal squamous cell carcinoma. He recommended a PET scan for staging.  On 8/29/2014 PET scan was obtained.  Imaging demonstrated hypermetabolic activity in the anal canal region corresponding with the known malignancy.  Maximum SUV was 21.1.  The mass was noted to be approximately 3.3 cm in size.  There are also noted hypermetabolic activity in the left inguinal region, SUV of 15.2, corresponding to the left groin node measuring 3 cm x 1.8 cm.       SITE OF TREATMENT: Anal canal/pelvic+inguinal nodes    DATES  OF TREATMENT: 9/20/18-11/15/18    TOTAL DOSE OF TREATMENT: 5400 cGy    DOSE PER FRACTION OF TREATMENT:  180 cGy      COMMENT/TOXICITY:  Admitted for neutropenia, pain management and dehydration 10/25/18-11/25/2018.  Received IVF hydration. Used silvadene and viscous topical lidocaine for skin care. Used rinsing bottle for dimitris-anal area. Imodium PRN loose stool.  Treatment held 10/27/18-11/11/18  ANC < 500 and/or Plt < 50k (CBC 10/26/18 demonstrated WBC 1.0 (ANC 0.8), Plt 83, Hgb 10.2). Resumed treatment on 11/12/18.     At completion of treatment:  Skin - Significantly improved moist desquamation in dimitris-anal area and vaginal introitus   Anal canal - protruding mass significantly decreased since starting RT.          PAIN MANAGEMENT:    Pain management (dimitris-anal skin pain):  Received aggressive pain regiment with IV dilaudid and oral pain meds while inpatient 10/25/18-11/25/18.  After discharge intermittent pain meds and morphine gel for skin pain.                   FOLLOW UP PLAN:  1. Continue skin care.  2. Will plan to follow up in 2 weeks.   3. Counseled patient on post-RT treatment effects.   4. Will need to order MRI/PET 4-8 weeks from completion of treatment and refer to surgery for endoscopic evaluation.    Radiation Oncologist: Edmundo Iqbal M.D.  Department of Radiation Oncology  [facility]

## 2018-11-20 NOTE — PROGRESS NOTES
This is a recent snapshot of the patient's [facility] medical record.  For current drug dose and complete information and questions, call [phone] or In Basket pool, [facility] ([id number])  CSN Number:  [id number]

## 2018-11-29 DIAGNOSIS — C21.1 MALIGNANT NEOPLASM OF ANAL CANAL (H): Primary | ICD-10-CM

## 2018-12-03 ENCOUNTER — TELEPHONE (OUTPATIENT)
Dept: ONCOLOGY | Facility: CLINIC | Age: 68
End: 2018-12-03

## 2018-12-03 NOTE — TELEPHONE ENCOUNTER
Ying RIOS from home care called to report that patient's radiation burns are better. She will be receiving monthly visits for port care and flushes. Will forward to Dr. Gunn to keep him informed.  Rupa Plascencia RN

## 2018-12-20 ENCOUNTER — TELEPHONE (OUTPATIENT)
Dept: RADIATION THERAPY | Facility: OUTPATIENT CENTER | Age: 68
End: 2018-12-20

## 2018-12-20 ENCOUNTER — ONCOLOGY VISIT (OUTPATIENT)
Dept: ONCOLOGY | Facility: CLINIC | Age: 68
End: 2018-12-20
Attending: INTERNAL MEDICINE
Payer: COMMERCIAL

## 2018-12-20 ENCOUNTER — HOSPITAL ENCOUNTER (OUTPATIENT)
Dept: LAB | Facility: CLINIC | Age: 68
Discharge: HOME | End: 2018-12-20
Attending: INTERNAL MEDICINE | Admitting: INTERNAL MEDICINE
Payer: MEDICARE

## 2018-12-20 ENCOUNTER — OFFICE VISIT (OUTPATIENT)
Dept: RADIATION THERAPY | Facility: OUTPATIENT CENTER | Age: 68
End: 2018-12-20
Payer: COMMERCIAL

## 2018-12-20 VITALS
WEIGHT: 155.4 LBS | OXYGEN SATURATION: 98 % | BODY MASS INDEX: 24.34 KG/M2 | DIASTOLIC BLOOD PRESSURE: 71 MMHG | HEART RATE: 77 BPM | RESPIRATION RATE: 18 BRPM | SYSTOLIC BLOOD PRESSURE: 116 MMHG

## 2018-12-20 VITALS
BODY MASS INDEX: 23.49 KG/M2 | RESPIRATION RATE: 18 BRPM | DIASTOLIC BLOOD PRESSURE: 59 MMHG | SYSTOLIC BLOOD PRESSURE: 113 MMHG | HEIGHT: 68 IN | WEIGHT: 155 LBS | HEART RATE: 95 BPM | OXYGEN SATURATION: 99 % | TEMPERATURE: 98.5 F

## 2018-12-20 DIAGNOSIS — Z53.9 ERRONEOUS ENCOUNTER--DISREGARD: Primary | ICD-10-CM

## 2018-12-20 DIAGNOSIS — T45.1X5A CHEMOTHERAPY INDUCED NAUSEA AND VOMITING: ICD-10-CM

## 2018-12-20 DIAGNOSIS — R11.2 CHEMOTHERAPY INDUCED NAUSEA AND VOMITING: ICD-10-CM

## 2018-12-20 DIAGNOSIS — C21.1 MALIGNANT NEOPLASM OF ANAL CANAL (H): Primary | ICD-10-CM

## 2018-12-20 DIAGNOSIS — I48.20 CHRONIC ATRIAL FIBRILLATION (H): ICD-10-CM

## 2018-12-20 DIAGNOSIS — C21.1 MALIGNANT NEOPLASM OF ANAL CANAL (H): ICD-10-CM

## 2018-12-20 LAB
ALBUMIN SERPL-MCNC: 3 G/DL (ref 3.4–5)
ALP SERPL-CCNC: 69 U/L (ref 40–150)
ALT SERPL W P-5'-P-CCNC: 18 U/L (ref 0–50)
ANION GAP SERPL CALCULATED.3IONS-SCNC: 6 MMOL/L (ref 3–14)
AST SERPL W P-5'-P-CCNC: 19 U/L (ref 0–45)
BASOPHILS # BLD AUTO: 0 10E9/L (ref 0–0.2)
BASOPHILS NFR BLD AUTO: 0.2 %
BILIRUB SERPL-MCNC: 0.7 MG/DL (ref 0.2–1.3)
BUN SERPL-MCNC: 14 MG/DL (ref 7–30)
CALCIUM SERPL-MCNC: 9.2 MG/DL (ref 8.5–10.1)
CHLORIDE SERPL-SCNC: 107 MMOL/L (ref 94–109)
CO2 SERPL-SCNC: 27 MMOL/L (ref 20–32)
CREAT SERPL-MCNC: 0.67 MG/DL (ref 0.52–1.04)
DIFFERENTIAL METHOD BLD: ABNORMAL
EOSINOPHIL # BLD AUTO: 0.1 10E9/L (ref 0–0.7)
EOSINOPHIL NFR BLD AUTO: 1.6 %
ERYTHROCYTE [DISTWIDTH] IN BLOOD BY AUTOMATED COUNT: 14.7 % (ref 10–15)
GFR SERPL CREATININE-BSD FRML MDRD: >90 ML/MIN/{1.73_M2}
GLUCOSE SERPL-MCNC: 116 MG/DL (ref 70–99)
HCT VFR BLD AUTO: 37.6 % (ref 35–47)
HGB BLD-MCNC: 12.4 G/DL (ref 11.7–15.7)
IMM GRANULOCYTES # BLD: 0 10E9/L (ref 0–0.4)
IMM GRANULOCYTES NFR BLD: 0.5 %
LYMPHOCYTES # BLD AUTO: 0.9 10E9/L (ref 0.8–5.3)
LYMPHOCYTES NFR BLD AUTO: 16.6 %
MAGNESIUM SERPL-MCNC: 2.1 MG/DL (ref 1.6–2.3)
MCH RBC QN AUTO: 36.4 PG (ref 26.5–33)
MCHC RBC AUTO-ENTMCNC: 33 G/DL (ref 31.5–36.5)
MCV RBC AUTO: 110 FL (ref 78–100)
MONOCYTES # BLD AUTO: 0.5 10E9/L (ref 0–1.3)
MONOCYTES NFR BLD AUTO: 8.4 %
NEUTROPHILS # BLD AUTO: 4 10E9/L (ref 1.6–8.3)
NEUTROPHILS NFR BLD AUTO: 72.7 %
NRBC # BLD AUTO: 0 10*3/UL
NRBC BLD AUTO-RTO: 0 /100
PLATELET # BLD AUTO: 161 10E9/L (ref 150–450)
POTASSIUM SERPL-SCNC: 4.3 MMOL/L (ref 3.4–5.3)
PROT SERPL-MCNC: 6.3 G/DL (ref 6.8–8.8)
RBC # BLD AUTO: 3.41 10E12/L (ref 3.8–5.2)
SODIUM SERPL-SCNC: 140 MMOL/L (ref 133–144)
WBC # BLD AUTO: 5.5 10E9/L (ref 4–11)

## 2018-12-20 PROCEDURE — G0463 HOSPITAL OUTPT CLINIC VISIT: HCPCS

## 2018-12-20 PROCEDURE — 85025 COMPLETE CBC W/AUTO DIFF WBC: CPT | Performed by: INTERNAL MEDICINE

## 2018-12-20 PROCEDURE — 36415 COLL VENOUS BLD VENIPUNCTURE: CPT | Performed by: INTERNAL MEDICINE

## 2018-12-20 PROCEDURE — 80053 COMPREHEN METABOLIC PANEL: CPT | Performed by: INTERNAL MEDICINE

## 2018-12-20 PROCEDURE — 99214 OFFICE O/P EST MOD 30 MIN: CPT | Performed by: INTERNAL MEDICINE

## 2018-12-20 PROCEDURE — 83735 ASSAY OF MAGNESIUM: CPT | Performed by: INTERNAL MEDICINE

## 2018-12-20 ASSESSMENT — PAIN SCALES - GENERAL
PAINLEVEL: NO PAIN (0)
PAINLEVEL: NO PAIN (0)

## 2018-12-20 ASSESSMENT — MIFFLIN-ST. JEOR: SCORE: 1278.64

## 2018-12-20 NOTE — TELEPHONE ENCOUNTER
Call made to Dr. Becerra's clinic to confer with her about potential suregery for Domenica. Either with Dr. Becerra or asking who she would refer her to locally for anal ca s/p chemoradiation.  Awaiting call back.  Domenica wants to wait to recover a little longer before scheduling MRI and PET scan. Dr. Iqbal and this team will check in with Domenica in 2 wks to discuss scans and surgery visit/referral.

## 2018-12-20 NOTE — PATIENT INSTRUCTIONS
We would like you to have labs drawn today. Dr. Gunn would like to see you back as needed. There are no future appointments scheduled at this time.      When you are in need of a refill, please call your pharmacy and they will send us a request.      Copy of appointments, and after visit summary (AVS) given to patient.      If you have any questions during business hours (M-F 8 AM- 4PM), please call Rupa Plascencia RN Oncology Hematology  at [facility] [phone].       For questions after business hours, or on holidays/weekends, please call our after hours Nurse Triage line [phone]. Thank you.

## 2018-12-20 NOTE — PROGRESS NOTES
Hematology/ Oncology Follow-up Visit:  Dec 20, 2018    Reason for Visit:   Chief Complaint   Patient presents with     Oncology Clinic Visit     Post RT. Follow up for Anal Cancer.        Oncologic History:  Malignant neoplasm of anal canal (H)    Jenise Calix has been difficulty with hemorrhoids for several years.  She saw  last year in September 2017 because of large hemorrhoids and it was recommended to proceed with surgery.  Patient elected to postpone the surgery and go to [place] to visit her children.  She continued to have difficulty with defecation.  She has been having also pain with bowel movement as well.  She has been taking stool softener.  She has been also having occasional rectal bleeding.  She recently saw Dr. Becerra on July 16, 2018 and had a flexible sigmoidoscopy which showed severely ulcerated lesion at 12 o'clock position was definitive irregularity and fullness at 6 o'clock position.  Biopsies were obtained.  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  Patient had a CT scan of the chest abdomen and pelvis on July 16, 2018 which revealed 0.9 cm nodule he will aspect of the right breast felt to be sebaceous cyst.  There was a mass involving the anus and the rectum particularly on the right measuring about 7 cm x 4 cm and extending anterior to posterior 5 cm posterior and 10 cm in longest axis.  This is inseparable from the floor of urinary bladder, extending to the skin service and engulfing the vagina and urethra.  The perirectal and anal lymph nodes were involved.  There is also a 3 cm mass in the left inguinal nodes.  PET scan was done showing  intensely hypermetabolic mass in the anal canal consistent with primary malignancy.  There is hypermetabolic adenopathy in the left inguinal location worrisome for malignant adenopathy.. There is a  focal hypermetabolic area in the left thyroid indeterminant.. No suggestion of metastatic disease.    Patient started on radiation radiation therapy concurrent with infusional 5-FU and mitomycin.      Interval History:  Returns today for follow-up.  She concluded her radiation and chemotherapy.  She is recovering very well with from treatment.  She denies any recent history of blood in the stool or difficulty with bowel movements.  She denies any shortness of breath or cough or wheezing.  She denies any nausea vomiting or diarrhea.    Review Of Systems:  Constitutional: Negative for fever, chills, and night sweats.  Skin: negative.  Eyes: negative.  Ears/Nose/Throat: negative.  Respiratory: No shortness of breath, dyspnea on exertion, cough, or hemoptysis.  Cardiovascular: negative.  Gastrointestinal: negative.  Genitourinary: negative.  Musculoskeletal: negative.  Neurologic: negative.  Psychiatric: negative.  Hematologic/Lymphatic/Immunologic: negative.  Endocrine: negative.    All other ROS negative unless mentioned in interval history.    Past medical, social, surgical, and family histories reviewed.    Allergies:  Allergies as of 12/20/2018 - Reviewed 12/20/2018   Allergen Reaction Noted     Metoprolol  07/26/2018       Current Medications:  Current Outpatient Medications   Medication Sig Dispense Refill     aspirin 81 MG tablet Take 81 mg by mouth daily        oxyCODONE IR (ROXICODONE) 10 MG tablet Take 1 tablet (10 mg) by mouth every 4 hours as needed for moderate to severe pain 100 tablet 0     ondansetron (ZOFRAN) 4 MG tablet Take 1 tablet (4 mg) by mouth every 8 hours as needed for nausea (Patient not taking: Reported on 12/20/2018) 18 tablet 1     polyethylene glycol (MIRALAX/GLYCOLAX) Packet Take 17 g by mouth daily as needed for constipation (Patient not taking: Reported on 12/20/2018) 7 packet      prochlorperazine (COMPAZINE) 10 MG tablet Take 1 tablet (10 mg) by mouth every 6 hours as needed  "(Nausea/Vomiting) (Patient not taking: Reported on 12/20/2018) 30 tablet 1        Physical Exam:  /59 (BP Location: Right arm, Patient Position: Sitting, Cuff Size: Adult Regular)   Pulse 95   Temp 98.5  F (36.9  C) (Tympanic)   Resp 18   Ht 1.715 m (5' 7.5\")   Wt 70.3 kg (155 lb)   SpO2 99%   Breastfeeding? No   BMI 23.92 kg/m    Wt Readings from Last 12 Encounters:   12/20/18 70.3 kg (155 lb)   12/20/18 70.5 kg (155 lb 6.4 oz)   11/14/18 70.8 kg (156 lb)   11/05/18 76.2 kg (167 lb 15.9 oz)   10/25/18 69.4 kg (153 lb 1.6 oz)   10/17/18 73.1 kg (161 lb 3.2 oz)   10/10/18 73.5 kg (162 lb)   10/10/18 73.5 kg (162 lb)   10/03/18 72.1 kg (159 lb)   09/26/18 74.8 kg (165 lb)   09/18/18 76.7 kg (169 lb)   09/14/18 76.7 kg (169 lb)     ECOG performance status: 0  GENERAL APPEARANCE: Healthy, alert and in no acute distress.  HEENT: Sclerae anicteric. PERRLA. Oropharynx without ulcers, lesions, or thrush.  NECK: Supple. No asymmetry or masses.  LYMPHATICS: No palpable cervical, supraclavicular, axillary, or inguinal lymphadenopathy.  RESP: Lungs clear to auscultation bilaterally without rales, rhonchi or wheezes.  CARDIOVASCULAR: Regular rate and rhythm. Normal S1, S2; no S3 or S4. No murmur, gallop, or rub.  ABDOMEN: Soft, nontender. Bowel sounds normal. No palpable organomegaly or masses.  MUSCULOSKELETAL: Extremities without gross deformities noted. No edema of bilateral lower extremities.  SKIN: No suspicious lesions or rashes.  NEURO: Alert and oriented x 3. Cranial nerves II-XII grossly intact.  PSYCHIATRIC: Mentation and affect appear normal.    Laboratory/Imaging Studies:  No visits with results within 2 Week(s) from this visit.   Latest known visit with results is:   Infusion Therapy Visit on 10/25/2018   Component Date Value Ref Range Status     Sodium 10/25/2018 137  133 - 144 mmol/L Final     Potassium 10/25/2018 3.9  3.4 - 5.3 mmol/L Final     Chloride 10/25/2018 103  94 - 109 mmol/L Final     " Carbon Dioxide 10/25/2018 24  20 - 32 mmol/L Final     Anion Gap 10/25/2018 10  3 - 14 mmol/L Final     Glucose 10/25/2018 85  70 - 99 mg/dL Final     Urea Nitrogen 10/25/2018 13  7 - 30 mg/dL Final     Creatinine 10/25/2018 0.57  0.52 - 1.04 mg/dL Final     GFR Estimate 10/25/2018 >90  >60 mL/min/1.7m2 Final    Non  GFR Calc     GFR Estimate If Black 10/25/2018 >90  >60 mL/min/1.7m2 Final    African American GFR Calc     Calcium 10/25/2018 8.8  8.5 - 10.1 mg/dL Final     Bilirubin Total 10/25/2018 1.4* 0.2 - 1.3 mg/dL Final     Albumin 10/25/2018 2.9* 3.4 - 5.0 g/dL Final     Protein Total 10/25/2018 6.6* 6.8 - 8.8 g/dL Final     Alkaline Phosphatase 10/25/2018 86  40 - 150 U/L Final     ALT 10/25/2018 26  0 - 50 U/L Final     AST 10/25/2018 14  0 - 45 U/L Final     WBC 10/25/2018 1.3* 4.0 - 11.0 10e9/L Final     RBC Count 10/25/2018 3.55* 3.8 - 5.2 10e12/L Final     Hemoglobin 10/25/2018 11.8  11.7 - 15.7 g/dL Final     Hematocrit 10/25/2018 34.0* 35.0 - 47.0 % Final     MCV 10/25/2018 96  78 - 100 fl Final     MCH 10/25/2018 33.2* 26.5 - 33.0 pg Final     MCHC 10/25/2018 34.7  31.5 - 36.5 g/dL Final     RDW 10/25/2018 13.2  10.0 - 15.0 % Final     Platelet Count 10/25/2018 100* 150 - 450 10e9/L Final     Diff Method 10/25/2018 Automated Method   Final     % Neutrophils 10/25/2018 81.9  % Final     % Lymphocytes 10/25/2018 7.9  % Final     % Monocytes 10/25/2018 5.5  % Final     % Eosinophils 10/25/2018 3.1  % Final     % Basophils 10/25/2018 0.8  % Final     % Immature Granulocytes 10/25/2018 0.8  % Final     Nucleated RBCs 10/25/2018 0  0 /100 Final     Absolute Neutrophil 10/25/2018 1.0* 1.6 - 8.3 10e9/L Final     Absolute Lymphocytes 10/25/2018 0.1* 0.8 - 5.3 10e9/L Final     Absolute Monocytes 10/25/2018 0.1  0.0 - 1.3 10e9/L Final     Absolute Eosinophils 10/25/2018 0.0  0.0 - 0.7 10e9/L Final     Absolute Basophils 10/25/2018 0.0  0.0 - 0.2 10e9/L Final     Abs Immature Granulocytes  10/25/2018 0.0  0 - 0.4 10e9/L Final     Absolute Nucleated RBC 10/25/2018 0.0   Final        Assessment and plan:    (C21.1) Malignant neoplasm of anal canal (H)  (primary encounter diagnosis)  I reviewed with the patient today management of anal carcinoma.  Patient concluded initial therapy.  We recommend follow-up with imaging and sigmoidoscopy.  Patient continue to follow with radiation oncology.  I have not scheduled the patient for any further appointments I will see her in the future if there is new developments or concerns.    (R11.2,  T45.1X5A) Chemotherapy induced nausea and vomiting  He is currently well controlled    (I48.2) Chronic atrial fibrillation (H)  Continues to monitor there is to follow primary care physician.    The patient is ready to learn, no apparent learning barriers were identified.  Diagnosis and treatment plans were explained to the patient. The patient expressed understanding of the content. The patient asked appropriate questions. The patient questions were answered to her satisfaction.    Chart documentation with Dragon Voice recognition Software. Although reviewed after completion, some words and grammatical errors may remain.

## 2018-12-20 NOTE — LETTER
2018      RE: Jenise Calix  [address]  [place] [address]          Department of Radiation Oncology  Radiation Therapy Center  [facility] [place] [address]  [phone]       Radiation Oncology Follow-up Visit  2018      Jenise Calix  MRN: [id number]   : 1950     DIAGNOSIS: Squamous cell carcinoma of the anal canal   INTENT OF RADIOTHERAPY: definitive CRT  PATHOLOGY:   moderately differentiated nonkeratinizing invasive squamous cell carcinoma.                               STAGE: cT3-4N1  CONCURRENT SYSTEMIC THERAPY:  5FU/MMC       ONCOLOGIC HISTORY:    The patient has a long-standing history of hemorrhoids for several years.  She was initially seen by Dr. Becerra who recommended hemorrhoidectomy in 2017 due to large and symptomatic hemorrhoids.  The patient initially elected to postpone the surgery at the time.  She continued to have clinical symptoms of constipation, pain with bowel movement and, intermittent rectal bleeding. More recently on 2018 she underwent flexible sigmoidoscopy.  Findings demonstrated a severely ulcerated lesion at the 12 o'clock position with definite irregularity and fullness at the 6 o'clock position.  Biopsies of the lesion at the 12 o'clock position demonstrated moderately differentiated nonkeratinizing invasive squamous cell carcinoma.  Biopsy of the 6 o'clock lesion demonstrated at least squamous cell carcinoma in situ, cannot exclude invasion.  On 2018 the patient underwent a CT of the chest abdomen and pelvis.  CT scan demonstrated a mass involving the anus and rectum.  The mass was noted to be measuring 4 cm in diameter by 7 cm in the longest axis.  There was associated abnormal soft tissue extension anteriorly measuring 5 cm in the anterior to posterior direction, and 10 cm in the long axis.  This was noted to be inseparable from the floor the urinary bladder and extending to the skin  surface, and engulfing the vagina and urethra.  There is also noted a 3 cm x 1.3 cm left inguinal groin node as well as enlarged perirectal nodes.  On 8/23/2018 the patient was seen by Dr. Gunn who discussed the potential role of definitive chemoradiation therapy for treatment of locally advanced anal squamous cell carcinoma. He recommended a PET scan for staging.  On 8/29/2014 PET scan was obtained.  Imaging demonstrated hypermetabolic activity in the anal canal region corresponding with the known malignancy.  Maximum SUV was 21.1.  The mass was noted to be approximately 3.3 cm in size.  There are also noted hypermetabolic activity in the left inguinal region, SUV of 15.2, corresponding to the left groin node measuring 3 cm x 1.8 cm.        SITE OF TREATMENT: Anal canal/pelvic+inguinal nodes     DATES  OF TREATMENT: 9/20/18-11/15/18     TOTAL DOSE OF TREATMENT: 5400 cGy     DOSE PER FRACTION OF TREATMENT:  180 cGy      COMMENT/TOXICITY:  Admitted for neutropenia, pain management and dehydration 10/25/18-11/25/2018.  Received IVF hydration. Used silvadene and viscous topical lidocaine for skin care. Used rinsing bottle for dimitris-anal area. Imodium PRN loose stool.  Treatment held 10/27/18-11/11/18  ANC < 500 and/or Plt < 50k (CBC 10/26/18 demonstrated WBC 1.0 (ANC 0.8), Plt 83, Hgb 10.2). Resumed treatment on 11/12/18.     INTERVAL SINCE COMPLETION OF RADIATION THERAPY:   4 weeks    SUBJECTIVE:   Ms. Calix returns for follow up. She is overall doing better. Loose stool has resolved. Skin has been healing well. She continues to apply skin creams. No blood in stool. Denies any anal or pelvic pain. Energy improving, but still fatigued. Has recovered 40-50% in terms of fatigue. Denies    PHYSICAL EXAM:  /71   Pulse 77   Resp 18   Wt 70.5 kg (155 lb 6.4 oz)   SpO2 98%   BMI 24.34 kg/m     Gen: Alert, in NAD  Eyes: PERRL, EOMI, sclera anicteric    Neck: Supple, full ROM, no LAD  Pulm: No wheezing, stridor  or respiratory distress  CV: Well-perfused, no cyanosis, no pedal edema  Abdominal: Soft, nontender, nondistended, no hepatomegaly  Back: No step-offs or pain to palpation along the thoracolumbar spine, no CVA tenderness  Anal/Rectal: No definite palpable tumor in anal canal. ? Irregular palpable tissue near 6 oclock position without clear palpable mass.  Musculoskeletal: Normal bulk and tone   Skin: Normal color and turgor. Dimitris-anal, vaginal, and groin skin folds much improved. Very small areas of desquamation near right dimitris-vaginal area and mid gluteal fold. Otherwise well healed with residual hyperpigmentation.   Neurologic: A/Ox3, CN II-XII intact  Psychiatric: Appropriate mood and affect    LABS AND IMAGING:  Reviewed.    IMPRESSION:   Ms. Claix is a 68 year old female with a cT3-4N1 squamous cell carcinoma of the anal canal status post definitive CRT, completed on 11/15/18.    PLAN:   1. Acute toxicities much improved from GI and skin perspective. Residual fatigue remains, but improving slowly. Will continue to improve with time.     2. SHYLA demonstrated excellent clinical response. There was a ? Irregular palpable tissue in canal, but no definite mass palpated.     3. Discussed would still need further imaging (including MRI and PET) and endoscopy by surgery to determine clinical status post treatment. Patient does not wish to undergo further tests at this time. I discussed possibility of order scans in 4 weeks. Patient stated she wishes to continue recovery process before proceeding. We discussed possibility of obtaining scans and surgery follow up closer to home. Will reach out in 2 weeks to re-discuss re-staging scans and referral to surgery for scope.      Edmundo Iqbal M.D.  Department of Radiation Oncology  [facility]

## 2018-12-20 NOTE — NURSING NOTE
"Oncology Rooming Note    December 20, 2018 2:19 PM   Jenise Calix is a 68 year old female who presents for:    Chief Complaint   Patient presents with     Oncology Clinic Visit     Post RT. Follow up for Anal Cancer.      Initial Vitals: /59 (BP Location: Right arm, Patient Position: Sitting, Cuff Size: Adult Regular)   Pulse 95   Temp 98.5  F (36.9  C) (Tympanic)   Resp 18   Ht 1.715 m (5' 7.5\")   Wt 70.3 kg (155 lb)   SpO2 99%   Breastfeeding? No   BMI 23.92 kg/m   Estimated body mass index is 23.92 kg/m  as calculated from the following:    Height as of this encounter: 1.715 m (5' 7.5\").    Weight as of this encounter: 70.3 kg (155 lb). Body surface area is 1.83 meters squared.  No Pain (0) Comment: Data Unavailable   No LMP recorded. Patient is postmenopausal.  Allergies reviewed: Yes  Medications reviewed: Yes    Medications: Medication refills not needed today.  Pharmacy name entered into EPIC:    [id number] - [place], [place] - [address] [facility] [place], [place] - [address]    Clinical concerns: Post RT. Follow up for Anal Cancer.     8 minutes for nursing intake (face to face time)     Faby Neff [facility]            "

## 2018-12-20 NOTE — LETTER
12/20/2018         RE: Jenise Calix  [address]  [place] [address]        Dear Colleague,    Thank you for referring your patient, Jenise Calix, to the [facility]. Please see a copy of my visit note below.    Hematology/ Oncology Follow-up Visit:  Dec 20, 2018    Reason for Visit:   Chief Complaint   Patient presents with     Oncology Clinic Visit     Post RT. Follow up for Anal Cancer.        Oncologic History:  Malignant neoplasm of anal canal (H)    Jenise Calix has been difficulty with hemorrhoids for several years.  She saw  last year in September 2017 because of large hemorrhoids and it was recommended to proceed with surgery.  Patient elected to postpone the surgery and go to [place] to visit her children.  She continued to have difficulty with defecation.  She has been having also pain with bowel movement as well.  She has been taking stool softener.  She has been also having occasional rectal bleeding.  She recently saw Dr. Becerra on July 16, 2018 and had a flexible sigmoidoscopy which showed severely ulcerated lesion at 12 o'clock position was definitive irregularity and fullness at 6 o'clock position.  Biopsies were obtained.  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  Patient had a CT scan of the chest abdomen and pelvis on July 16, 2018 which revealed 0.9 cm nodule he will aspect of the right breast felt to be sebaceous cyst.  There was a mass involving the anus and the rectum particularly on the right measuring about 7 cm x 4 cm and extending anterior to posterior 5 cm posterior and 10 cm in longest axis.  This is inseparable from the floor of urinary bladder, extending to the skin service and engulfing the vagina and urethra.  The perirectal and anal lymph nodes were involved.  There is also a 3 cm mass in the left inguinal  nodes.  PET scan was done showing  intensely hypermetabolic mass in the anal canal consistent with primary malignancy.  There is hypermetabolic adenopathy in the left inguinal location worrisome for malignant adenopathy.. There is a focal hypermetabolic area in the left thyroid indeterminant.. No suggestion of metastatic disease.    Patient started on radiation radiation therapy concurrent with infusional 5-FU and mitomycin.      Interval History:  Returns today for follow-up.  She concluded her radiation and chemotherapy.  She is recovering very well with from treatment.  She denies any recent history of blood in the stool or difficulty with bowel movements.  She denies any shortness of breath or cough or wheezing.  She denies any nausea vomiting or diarrhea.    Review Of Systems:  Constitutional: Negative for fever, chills, and night sweats.  Skin: negative.  Eyes: negative.  Ears/Nose/Throat: negative.  Respiratory: No shortness of breath, dyspnea on exertion, cough, or hemoptysis.  Cardiovascular: negative.  Gastrointestinal: negative.  Genitourinary: negative.  Musculoskeletal: negative.  Neurologic: negative.  Psychiatric: negative.  Hematologic/Lymphatic/Immunologic: negative.  Endocrine: negative.    All other ROS negative unless mentioned in interval history.    Past medical, social, surgical, and family histories reviewed.    Allergies:  Allergies as of 12/20/2018 - Reviewed 12/20/2018   Allergen Reaction Noted     Metoprolol  07/26/2018       Current Medications:  Current Outpatient Medications   Medication Sig Dispense Refill     aspirin 81 MG tablet Take 81 mg by mouth daily        oxyCODONE IR (ROXICODONE) 10 MG tablet Take 1 tablet (10 mg) by mouth every 4 hours as needed for moderate to severe pain 100 tablet 0     ondansetron (ZOFRAN) 4 MG tablet Take 1 tablet (4 mg) by mouth every 8 hours as needed for nausea (Patient not taking: Reported on 12/20/2018) 18 tablet 1     polyethylene glycol  "(MIRALAX/GLYCOLAX) Packet Take 17 g by mouth daily as needed for constipation (Patient not taking: Reported on 12/20/2018) 7 packet      prochlorperazine (COMPAZINE) 10 MG tablet Take 1 tablet (10 mg) by mouth every 6 hours as needed (Nausea/Vomiting) (Patient not taking: Reported on 12/20/2018) 30 tablet 1        Physical Exam:  /59 (BP Location: Right arm, Patient Position: Sitting, Cuff Size: Adult Regular)   Pulse 95   Temp 98.5  F (36.9  C) (Tympanic)   Resp 18   Ht 1.715 m (5' 7.5\")   Wt 70.3 kg (155 lb)   SpO2 99%   Breastfeeding? No   BMI 23.92 kg/m     Wt Readings from Last 12 Encounters:   12/20/18 70.3 kg (155 lb)   12/20/18 70.5 kg (155 lb 6.4 oz)   11/14/18 70.8 kg (156 lb)   11/05/18 76.2 kg (167 lb 15.9 oz)   10/25/18 69.4 kg (153 lb 1.6 oz)   10/17/18 73.1 kg (161 lb 3.2 oz)   10/10/18 73.5 kg (162 lb)   10/10/18 73.5 kg (162 lb)   10/03/18 72.1 kg (159 lb)   09/26/18 74.8 kg (165 lb)   09/18/18 76.7 kg (169 lb)   09/14/18 76.7 kg (169 lb)     ECOG performance status: 0  GENERAL APPEARANCE: Healthy, alert and in no acute distress.  HEENT: Sclerae anicteric. PERRLA. Oropharynx without ulcers, lesions, or thrush.  NECK: Supple. No asymmetry or masses.  LYMPHATICS: No palpable cervical, supraclavicular, axillary, or inguinal lymphadenopathy.  RESP: Lungs clear to auscultation bilaterally without rales, rhonchi or wheezes.  CARDIOVASCULAR: Regular rate and rhythm. Normal S1, S2; no S3 or S4. No murmur, gallop, or rub.  ABDOMEN: Soft, nontender. Bowel sounds normal. No palpable organomegaly or masses.  MUSCULOSKELETAL: Extremities without gross deformities noted. No edema of bilateral lower extremities.  SKIN: No suspicious lesions or rashes.  NEURO: Alert and oriented x 3. Cranial nerves II-XII grossly intact.  PSYCHIATRIC: Mentation and affect appear normal.    Laboratory/Imaging Studies:  No visits with results within 2 Week(s) from this visit.   Latest known visit with results is: "   Infusion Therapy Visit on 10/25/2018   Component Date Value Ref Range Status     Sodium 10/25/2018 137  133 - 144 mmol/L Final     Potassium 10/25/2018 3.9  3.4 - 5.3 mmol/L Final     Chloride 10/25/2018 103  94 - 109 mmol/L Final     Carbon Dioxide 10/25/2018 24  20 - 32 mmol/L Final     Anion Gap 10/25/2018 10  3 - 14 mmol/L Final     Glucose 10/25/2018 85  70 - 99 mg/dL Final     Urea Nitrogen 10/25/2018 13  7 - 30 mg/dL Final     Creatinine 10/25/2018 0.57  0.52 - 1.04 mg/dL Final     GFR Estimate 10/25/2018 >90  >60 mL/min/1.7m2 Final    Non  GFR Calc     GFR Estimate If Black 10/25/2018 >90  >60 mL/min/1.7m2 Final    African American GFR Calc     Calcium 10/25/2018 8.8  8.5 - 10.1 mg/dL Final     Bilirubin Total 10/25/2018 1.4* 0.2 - 1.3 mg/dL Final     Albumin 10/25/2018 2.9* 3.4 - 5.0 g/dL Final     Protein Total 10/25/2018 6.6* 6.8 - 8.8 g/dL Final     Alkaline Phosphatase 10/25/2018 86  40 - 150 U/L Final     ALT 10/25/2018 26  0 - 50 U/L Final     AST 10/25/2018 14  0 - 45 U/L Final     WBC 10/25/2018 1.3* 4.0 - 11.0 10e9/L Final     RBC Count 10/25/2018 3.55* 3.8 - 5.2 10e12/L Final     Hemoglobin 10/25/2018 11.8  11.7 - 15.7 g/dL Final     Hematocrit 10/25/2018 34.0* 35.0 - 47.0 % Final     MCV 10/25/2018 96  78 - 100 fl Final     MCH 10/25/2018 33.2* 26.5 - 33.0 pg Final     MCHC 10/25/2018 34.7  31.5 - 36.5 g/dL Final     RDW 10/25/2018 13.2  10.0 - 15.0 % Final     Platelet Count 10/25/2018 100* 150 - 450 10e9/L Final     Diff Method 10/25/2018 Automated Method   Final     % Neutrophils 10/25/2018 81.9  % Final     % Lymphocytes 10/25/2018 7.9  % Final     % Monocytes 10/25/2018 5.5  % Final     % Eosinophils 10/25/2018 3.1  % Final     % Basophils 10/25/2018 0.8  % Final     % Immature Granulocytes 10/25/2018 0.8  % Final     Nucleated RBCs 10/25/2018 0  0 /100 Final     Absolute Neutrophil 10/25/2018 1.0* 1.6 - 8.3 10e9/L Final     Absolute Lymphocytes 10/25/2018 0.1* 0.8 - 5.3  10e9/L Final     Absolute Monocytes 10/25/2018 0.1  0.0 - 1.3 10e9/L Final     Absolute Eosinophils 10/25/2018 0.0  0.0 - 0.7 10e9/L Final     Absolute Basophils 10/25/2018 0.0  0.0 - 0.2 10e9/L Final     Abs Immature Granulocytes 10/25/2018 0.0  0 - 0.4 10e9/L Final     Absolute Nucleated RBC 10/25/2018 0.0   Final        Assessment and plan:    (C21.1) Malignant neoplasm of anal canal (H)  (primary encounter diagnosis)  I reviewed with the patient today management of anal carcinoma.  Patient concluded initial therapy.  We recommend follow-up with imaging and sigmoidoscopy.  Patient continue to follow with radiation oncology.  I have not scheduled the patient for any further appointments I will see her in the future if there is new developments or concerns.    (R11.2,  T45.1X5A) Chemotherapy induced nausea and vomiting  He is currently well controlled    (I48.2) Chronic atrial fibrillation (H)  Continues to monitor there is to follow primary care physician.    The patient is ready to learn, no apparent learning barriers were identified.  Diagnosis and treatment plans were explained to the patient. The patient expressed understanding of the content. The patient asked appropriate questions. The patient questions were answered to her satisfaction.    Chart documentation with Dragon Voice recognition Software. Although reviewed after completion, some words and grammatical errors may remain.    Again, thank you for allowing me to participate in the care of your patient.        Sincerely,        Ced Gunn MD

## 2018-12-20 NOTE — PROGRESS NOTES
Department of Radiation Oncology  Radiation Therapy Center  [facility] [place] [address]  [phone]       Radiation Oncology Follow-up Visit  2018      Jenise Calix  MRN: [id number]   : 1950     DIAGNOSIS: Squamous cell carcinoma of the anal canal   INTENT OF RADIOTHERAPY: definitive CRT  PATHOLOGY:   moderately differentiated nonkeratinizing invasive squamous cell carcinoma.                               STAGE: cT3-4N1  CONCURRENT SYSTEMIC THERAPY:  5FU/MMC       ONCOLOGIC HISTORY:    The patient has a long-standing history of hemorrhoids for several years.  She was initially seen by Dr. Becerra who recommended hemorrhoidectomy in 2017 due to large and symptomatic hemorrhoids.  The patient initially elected to postpone the surgery at the time.  She continued to have clinical symptoms of constipation, pain with bowel movement and, intermittent rectal bleeding. More recently on 2018 she underwent flexible sigmoidoscopy.  Findings demonstrated a severely ulcerated lesion at the 12 o'clock position with definite irregularity and fullness at the 6 o'clock position.  Biopsies of the lesion at the 12 o'clock position demonstrated moderately differentiated nonkeratinizing invasive squamous cell carcinoma.  Biopsy of the 6 o'clock lesion demonstrated at least squamous cell carcinoma in situ, cannot exclude invasion.  On 2018 the patient underwent a CT of the chest abdomen and pelvis.  CT scan demonstrated a mass involving the anus and rectum.  The mass was noted to be measuring 4 cm in diameter by 7 cm in the longest axis.  There was associated abnormal soft tissue extension anteriorly measuring 5 cm in the anterior to posterior direction, and 10 cm in the long axis.  This was noted to be inseparable from the floor the urinary bladder and extending to the skin surface, and engulfing the vagina and urethra.  There is also noted a 3 cm x 1.3 cm  left inguinal groin node as well as enlarged perirectal nodes.  On 8/23/2018 the patient was seen by Dr. Gunn who discussed the potential role of definitive chemoradiation therapy for treatment of locally advanced anal squamous cell carcinoma. He recommended a PET scan for staging.  On 8/29/2014 PET scan was obtained.  Imaging demonstrated hypermetabolic activity in the anal canal region corresponding with the known malignancy.  Maximum SUV was 21.1.  The mass was noted to be approximately 3.3 cm in size.  There are also noted hypermetabolic activity in the left inguinal region, SUV of 15.2, corresponding to the left groin node measuring 3 cm x 1.8 cm.        SITE OF TREATMENT: Anal canal/pelvic+inguinal nodes     DATES  OF TREATMENT: 9/20/18-11/15/18     TOTAL DOSE OF TREATMENT: 5400 cGy     DOSE PER FRACTION OF TREATMENT:  180 cGy      COMMENT/TOXICITY:  Admitted for neutropenia, pain management and dehydration 10/25/18-11/25/2018.  Received IVF hydration. Used silvadene and viscous topical lidocaine for skin care. Used rinsing bottle for dimitris-anal area. Imodium PRN loose stool.  Treatment held 10/27/18-11/11/18  ANC < 500 and/or Plt < 50k (CBC 10/26/18 demonstrated WBC 1.0 (ANC 0.8), Plt 83, Hgb 10.2). Resumed treatment on 11/12/18.     INTERVAL SINCE COMPLETION OF RADIATION THERAPY:   4 weeks    SUBJECTIVE:   Ms. Calix returns for follow up. She is overall doing better. Loose stool has resolved. Skin has been healing well. She continues to apply skin creams. No blood in stool. Denies any anal or pelvic pain. Energy improving, but still fatigued. Has recovered 40-50% in terms of fatigue. Denies    PHYSICAL EXAM:  /71   Pulse 77   Resp 18   Wt 70.5 kg (155 lb 6.4 oz)   SpO2 98%   BMI 24.34 kg/m    Gen: Alert, in NAD  Eyes: PERRL, EOMI, sclera anicteric    Neck: Supple, full ROM, no LAD  Pulm: No wheezing, stridor or respiratory distress  CV: Well-perfused, no cyanosis, no pedal edema  Abdominal:  Soft, nontender, nondistended, no hepatomegaly  Back: No step-offs or pain to palpation along the thoracolumbar spine, no CVA tenderness  Anal/Rectal: No definite palpable tumor in anal canal. ? Irregular palpable tissue near 6 oclock position without clear palpable mass.  Musculoskeletal: Normal bulk and tone   Skin: Normal color and turgor. Dimitris-anal, vaginal, and groin skin folds much improved. Very small areas of desquamation near right dimitris-vaginal area and mid gluteal fold. Otherwise well healed with residual hyperpigmentation.   Neurologic: A/Ox3, CN II-XII intact  Psychiatric: Appropriate mood and affect    LABS AND IMAGING:  Reviewed.    IMPRESSION:   Ms. Calix is a 68 year old female with a cT3-4N1 squamous cell carcinoma of the anal canal status post definitive CRT, completed on 11/15/18.    PLAN:   1. Acute toxicities much improved from GI and skin perspective. Residual fatigue remains, but improving slowly. Will continue to improve with time.     2. SHYLA demonstrated excellent clinical response. There was a ? Irregular palpable tissue in canal, but no definite mass palpated.     3. Discussed would still need further imaging (including MRI and PET) and endoscopy by surgery to determine clinical status post treatment. Patient does not wish to undergo further tests at this time. I discussed possibility of order scans in 4 weeks. Patient stated she wishes to continue recovery process before proceeding. We discussed possibility of obtaining scans and surgery follow up closer to home. Will reach out in 2 weeks to re-discuss re-staging scans and referral to surgery for scope.      Edmundo Iqbal M.D.  Department of Radiation Oncology  [facility]

## 2018-12-20 NOTE — NURSING NOTE
FOLLOW-UP VISIT    Patient Name: Jenise Calix      : 1950     Age: 68 year old        ______________________________________________________________________________     Chief Complaint   Patient presents with     Radiation Therapy     follow up     /71   Pulse 77   Resp 18   Wt 70.5 kg (155 lb 6.4 oz)   SpO2 98%   BMI 24.34 kg/m       Date Radiation Completed: 11/15/18    Pain  Denies    Meds  Current Med List Reviewed: Yes  Medication Note:     Imaging  None - will need upcoming imaging    On Chemo?: No  Nausea:no  Bowel: Soft  Bladder: negative  Skin: Warm  Positive for erythema no open areas. has stopped doing salves/creams. denies pain with BM or urination  Energy Level: low, says she hasn't left home since getting home from hospital. She has friends and neighbors helping with groceries and mail.    Other Appointments:     Date  Oncologist: Dr. Gunn   2:20pm today   Surgeon: need to refer to colorectal - [facility] (or [facility] ?)      Other Notes:

## 2019-01-14 NOTE — ADDENDUM NOTE
Addended by: BREANA HURTADO on: 1/14/2019 10:54 AM     Modules accepted: Level of Service, SmartSet

## 2019-01-29 ENCOUNTER — TELEPHONE (OUTPATIENT)
Dept: RADIATION THERAPY | Facility: OUTPATIENT CENTER | Age: 69
End: 2019-01-29

## 2019-01-29 NOTE — TELEPHONE ENCOUNTER
Left message asking patient to call in order to set up appointment for PET and MRI scan. Asked patient to call back at earliest convenience to make appointment.

## 2019-02-21 ENCOUNTER — TELEPHONE (OUTPATIENT)
Dept: ONCOLOGY | Facility: CLINIC | Age: 69
End: 2019-02-21

## 2019-02-21 NOTE — TELEPHONE ENCOUNTER
Patient was seen for anal cancer. She was last seen on 10/25/19.   Received a call from Alona RIOS from [facility].  She has met with the patient and has a sign form stating that patient has decided to enter the hospice program. Alona is needing a verbal order from patient's oncologist. Will send message to Dr. Gunn.    Alona can be reached at [phone].  Rupa Plascencia RN    Left message on Alona's personal voice mail. Direct number provided. Per Dr. Gunn, need more information as to why patient is going into hospice. She was to follow up with imaging and sigmoidoscopy.  Patient continue to follow with radiation oncology.  Rupa Plascencia RN    Spoke with patient's daughter. She said that patient is not wanting to have any more treatments. She wants to go on to Hospice for pain control. She spoke with homecare and hospice and she was told she could do this. Explained to daughter that for pain management, she could be seen for palliative care or if she is wanting hospice, she would need to go through her primary. Spoke with Alona RIOS. Informed her that patient should be followed through her primary. Alona has contacted a provider that patient has seen and will go through them.  Rupa Plascencia RN

## 2019-05-06 ENCOUNTER — HOME INFUSION (PRE-WILLOW HOME INFUSION) (OUTPATIENT)
Dept: PHARMACY | Facility: CLINIC | Age: 69
End: 2019-05-06

## 2019-05-14 NOTE — PROGRESS NOTES
This is a recent snapshot of the patient's [facility] medical record.  For current drug dose and complete information and questions, call [phone] or In [facility] pool, [facility] home infusion ([id number])  CSN Number:  [id number]

## 2023-07-19 NOTE — MR AVS SNAPSHOT
After Visit Summary   8/30/2018    Jenise Calix    MRN: [id number]           Patient Information     Date Of Birth          1950        Visit Information        Provider Department      8/30/2018 1:30 PM Edmundo Iqbal MD [facility]        Today's Diagnoses     Malignant neoplasm of anal canal (H)    -  1    Atrial fibrillation (H)           Follow-ups after your visit        Your next 10 appointments already scheduled     Sep 04, 2018  9:45 AM CDT   New Visit with Chau Ferro MD   [facility] ([facility]    [address] [address]   [phone]            Sep 06, 2018  8:45 AM CDT   Return Visit with Edmundo Iqbal MD   [facility] ([facility])    [address], [address]  [place] [facility] [place] [address]   [phone]            Sep 06, 2018  9:15 AM CDT   SIMULATION with Edmundo Iqbal MD, [facility]   [facility] ([facility])    [address], [address]  [place] [facility] [place] [address]   [phone]            Sep 06, 2018 12:30 PM CDT   (Arrive by 12:15 PM)   MR PELVIS (GYN) WO & W CONTRAST with [address] MRI ([facility])    [address] [address]   [phone]           Take your medicines as usual, unless your doctor tells you not to. Bring a list of your current medicines to your exam (including vitamins, minerals and over-the-counter drugs).  You may or may not receive intravenous (IV) contrast for this exam pending the discretion of the Radiologist.  You do not need to do anything special to prepare.  The MRI machine uses a strong magnet. Please wear clothes without metal (snaps, zippers). A sweatsuit works well, or we may give you a hospital gown.  Please remove any body piercings and hair extensions before you arrive. You will also remove watches, jewelry, hairpins, wallets, dentures, partial dental  plates and hearing aids. You may wear contact lenses, and you may be able to wear your rings. We have a safe place to keep your personal items, but it is safer to leave them at home.  **IMPORTANT** THE INSTRUCTIONS BELOW ARE ONLY FOR THOSE PATIENTS WHO HAVE BEEN PRESCRIBED SEDATION OR GENERAL ANESTHESIA DURING THEIR MRI PROCEDURE:  IF YOUR DOCTOR PRESCRIBED ORAL SEDATION (take medicine to help you relax during your exam):   You must get the medicine from your doctor (oral medication) before you arrive. Bring the medicine to the exam. Do not take it at home. You ll be told when to take it upon arriving for your exam.   Arrive one hour early. Bring someone who can take you home after the test. Your medicine will make you sleepy. After the exam, you may not drive, take a bus or take a taxi by yourself.  IF YOUR DOCTOR PRESCRIBED IV SEDATION:   Arrive one hour early. Bring someone who can take you home after the test. Your medicine will make you sleepy. After the exam, you may not drive, take a bus or take a taxi by yourself.   No eating 6 hours before your exam. You may have clear liquids up until 4 hours before your exam. (Clear liquids include water, clear tea, black coffee and fruit juice without pulp.)  IF YOUR DOCTOR PRESCRIBED ANESTHESIA (be asleep for your exam):   Arrive 1 1/2 hours early. Bring someone who can take you home after the test. You may not drive, take a bus or take a taxi by yourself.   No eating 8 hours before your exam. You may have clear liquids up until 4 hours before your exam. (Clear liquids include water, clear tea, black coffee and fruit juice without pulp.)   You will spend four to five hours in the recovery room.  Please call the Imaging Department at your exam site with any questions.              Future tests that were ordered for you today     Open Future Orders        Priority Expected Expires Ordered    MR Pelvis (GYN) wo & w Contrast Routine  8/30/2019 8/30/2018    GENERAL SURG  ADULT REFERRAL Routine 2018            Who to contact     Please call your clinic at [phone] to:    Ask questions about your health    Make or cancel appointments    Discuss your medicines    Learn about your test results    Speak to your doctor            Additional Information About Your Visit        MyChart Information     [employer] is an electronic gateway that provides easy, online access to your medical records. With [employer], you can request a clinic appointment, read your test results, renew a prescription or communicate with your care team.     To sign up for [employer] visit the website at [email or web address]   You will be asked to enter the access code listed below, as well as some personal information. Please follow the directions to create your username and password.     Your access code is: [email or web address]  Expires: 2018 11:02 AM     Your access code will  in 90 days. If you need help or a new code, please contact your [facility] or call [phone] for assistance.        Care EveryWhere ID     This is your Care EveryWhere ID. This could be used by other organizations to access your [facility] medical records  [id number]        Your Vitals Were     Pulse Respirations Pulse Oximetry Breastfeeding? BMI (Body Mass Index)       57 16 98% No 26.56 kg/m2        Blood Pressure from Last 3 Encounters:   18 151/85   18 158/79   18 159/65    Weight from Last 3 Encounters:   18 76.9 kg (169 lb 9.6 oz)   18 77.2 kg (170 lb 3.2 oz)   18 77.4 kg (170 lb 11.2 oz)                 Today's Medication Changes          These changes are accurate as of 18  3:30 PM.  If you have any questions, ask your nurse or doctor.               Stop taking these medicines if you haven't already. Please contact your care team if you have questions.     cloNIDine 0.1 MG tablet   Commonly known as:  CATAPRES   Stopped by:   Edmundo Iqbal MD                    Primary Care Provider Fax #    Physician No Ref-Primary [phone]       No address on file        Equal Access to Services     AGUSTIN HAMILTON : Hadii aad ku hadreillyguero Brown, amishaadam diasevansha, yolanda conradoarieladam burrowsashley, katina alcaraz. So [facility] [phone].    ATENCIÓN: Si habla español, tiene a sanchez disposición servicios gratuitos de asistencia lingüística. Llame al [phone].    We comply with applicable federal civil rights laws and [place] laws. We do not discriminate on the basis of race, color, national origin, age, disability, sex, sexual orientation, or gender identity.            Thank you!     Thank you for choosing [facility]  for your care. Our goal is always to provide you with excellent care. Hearing back from our patients is one way we can continue to improve our services. Please take a few minutes to complete the written survey that you may receive in the mail after your visit with us. Thank you!             Your Updated Medication List - Protect others around you: Learn how to safely use, store and throw away your medicines at www.disposemymeds.org.          This list is accurate as of 8/30/18  3:30 PM.  Always use your most recent med list.                   Brand Name Dispense Instructions for use Diagnosis    aspirin 81 MG tablet      Take 81 mg by mouth           Cheek-To-Nose Interpolation Flap Text: A decision was made to reconstruct the defect utilizing an interpolation axial flap and a staged reconstruction.  A telfa template was made of the defect.  This telfa template was then used to outline the Cheek-To-Nose Interpolation flap.  The donor area for the pedicle flap was then injected with anesthesia.  The flap was excised through the skin and subcutaneous tissue down to the layer of the underlying musculature.  The interpolation flap was carefully excised within this deep plane to maintain its blood supply.  The edges of the donor site were undermined.   The donor site was closed in a primary fashion.  The pedicle was then rotated into position and sutured.  Once the tube was sutured into place, adequate blood supply was confirmed with blanching and refill.  The pedicle was then wrapped with xeroform gauze and dressed appropriately with a telfa and gauze bandage to ensure continued blood supply and protect the attached pedicle.

## 2023-10-26 NOTE — PLAN OF CARE
[place] [place]    Patient admitted to room 240 at approximately 1400 via wheel chair from Infusion. Patient was accompanied by other:Friend.     Verbal SBAR report received from GABRIEL Rosa prior to patient arrival.     Patient ambulated to bed with stand-by assist. Patient alert and oriented X 3. Pain is controlled with current analgesics.  Medication(s) being used: narcotic analgesics including hydromorphone (Dilaudid).  . Admission vital signs: Blood pressure 91/50, pulse 70, temperature 97.6  F (36.4  C), temperature source Axillary, resp. rate 18, height 1.702 m (5' 7\"), weight 71.2 kg (156 lb 15.5 oz), SpO2 99 %, not currently breastfeeding. Patient was oriented to plan of care, call light, bed controls, tv, telephone, bathroom and visiting hours.     Risk Assessment    The following safety risks were identified during admission: fall and skin. Yellow risk band applied: YES.     Skin Initial Assessment    This writer admitted this patient and completed a full skin assessment and Jose score in the Adult PCS flowsheet. Appropriate interventions initiated as needed.     Secondary skin check completed by GABRIEL Garrison.         Jose Risk Assessment  Sensory Perception: 3-->slightly limited  Moisture: 4-->rarely moist  Activity: 3-->walks occasionally  Mobility: 4-->no limitation  Nutrition: 2-->probably inadequate  Friction and Shear: 3-->no apparent problem  Jose Score: 19    Alysha Farias RN    " Split-Thickness Skin Graft Text: The defect edges were debeveled with a #15 scalpel blade.  Given the location of the defect, shape of the defect and the proximity to free margins a split thickness skin graft was deemed most appropriate.  Using a sterile surgical marker, the primary defect shape was transferred to the donor site. The split thickness graft was then harvested.  The skin graft was then placed in the primary defect and oriented appropriately.

## (undated) DEVICE — SYR 10ML FINGER CONTROL W/O NDL 309695

## (undated) DEVICE — ADHESIVE SWIFTSET 0.8ML OCTYL SS6

## (undated) DEVICE — BLADE KNIFE SURG 11 371111

## (undated) DEVICE — BLADE KNIFE SURG 15 371115

## (undated) DEVICE — DECANTER BAG 2002S

## (undated) DEVICE — PACK LAP TRANSVERSE STD

## (undated) DEVICE — NDL 22GA 1.5"

## (undated) DEVICE — SYR 10ML LL W/O NDL

## (undated) DEVICE — SOL NACL 0.9% IRRIG 1000ML BOTTLE 07138-09

## (undated) DEVICE — SOL NACL 0.9% 100ML BAG 2B1302

## (undated) DEVICE — LABEL MEDICATION SYSTEM  3304

## (undated) DEVICE — SU PROLENE 2-0 SHDA 36" 8523H

## (undated) DEVICE — SU VICRYL 4-0 FS-2 27" J422-H

## (undated) DEVICE — DRAPE C-ARM 60X42" 1013

## (undated) DEVICE — SPONGE RAY-TEC 4X8" 7318

## (undated) DEVICE — NDL COUNTER 20CT 31142493

## (undated) DEVICE — SOL WATER IRRIG 1000ML BOTTLE 07139-09

## (undated) DEVICE — DRAPE SHEET REV FOLD 3/4 9349

## (undated) DEVICE — PREP CHLORAPREP 26ML TINTED ORANGE  260815

## (undated) DEVICE — BASIN SET MINOR DISP

## (undated) DEVICE — SU VICRYL 3-0 SH 27" UND J416H

## (undated) DEVICE — LIGHT HANDLE X2

## (undated) DEVICE — GOWN XLG DISP 9545

## (undated) DEVICE — GLOVE PROTEXIS W/NEU-THERA 7.5  2D73TE75

## (undated) DEVICE — ESU PENCIL W/COATED BLADE E2450H

## (undated) RX ORDER — PROPOFOL 10 MG/ML
INJECTION, EMULSION INTRAVENOUS
Status: DISPENSED
Start: 2018-09-18

## (undated) RX ORDER — LIDOCAINE HYDROCHLORIDE 10 MG/ML
INJECTION, SOLUTION EPIDURAL; INFILTRATION; INTRACAUDAL; PERINEURAL
Status: DISPENSED
Start: 2018-09-18

## (undated) RX ORDER — BUPIVACAINE HYDROCHLORIDE AND EPINEPHRINE 5; 5 MG/ML; UG/ML
INJECTION, SOLUTION EPIDURAL; INTRACAUDAL; PERINEURAL
Status: DISPENSED
Start: 2018-09-18

## (undated) RX ORDER — LIDOCAINE HYDROCHLORIDE 10 MG/ML
INJECTION, SOLUTION INFILTRATION; PERINEURAL
Status: DISPENSED
Start: 2018-09-18

## (undated) RX ORDER — DEXAMETHASONE SODIUM PHOSPHATE 4 MG/ML
INJECTION, SOLUTION INTRA-ARTICULAR; INTRALESIONAL; INTRAMUSCULAR; INTRAVENOUS; SOFT TISSUE
Status: DISPENSED
Start: 2018-09-18

## (undated) RX ORDER — ONDANSETRON 2 MG/ML
INJECTION INTRAMUSCULAR; INTRAVENOUS
Status: DISPENSED
Start: 2018-09-18

## (undated) RX ORDER — FENTANYL CITRATE 50 UG/ML
INJECTION, SOLUTION INTRAMUSCULAR; INTRAVENOUS
Status: DISPENSED
Start: 2018-09-18